# Patient Record
Sex: FEMALE | Race: WHITE | NOT HISPANIC OR LATINO | Employment: UNEMPLOYED | URBAN - METROPOLITAN AREA
[De-identification: names, ages, dates, MRNs, and addresses within clinical notes are randomized per-mention and may not be internally consistent; named-entity substitution may affect disease eponyms.]

---

## 2017-01-09 ENCOUNTER — OFFICE VISIT (OUTPATIENT)
Dept: NEUROSURGERY | Facility: CLINIC | Age: 47
End: 2017-01-09

## 2017-01-09 VITALS
HEIGHT: 68 IN | DIASTOLIC BLOOD PRESSURE: 82 MMHG | WEIGHT: 293 LBS | SYSTOLIC BLOOD PRESSURE: 132 MMHG | BODY MASS INDEX: 44.41 KG/M2 | HEART RATE: 75 BPM

## 2017-01-09 DIAGNOSIS — G96.198 ARACHNOID CYST OF SPINE: ICD-10-CM

## 2017-01-09 DIAGNOSIS — M54.6 PAIN IN THORACIC SPINE: Primary | ICD-10-CM

## 2017-01-09 PROCEDURE — 99213 OFFICE O/P EST LOW 20 MIN: CPT | Performed by: NURSE PRACTITIONER

## 2017-01-09 RX ORDER — MORPHINE SULFATE 30 MG/1
30 TABLET, FILM COATED, EXTENDED RELEASE ORAL 2 TIMES DAILY
COMMUNITY
End: 2017-03-08

## 2017-01-09 NOTE — MR AVS SNAPSHOT
Bisi Perla   1/9/2017 3:30 PM   Office Visit    Dept Phone:  [phone]   Encounter #:  [id number]    Provider:  MAZIN Morris   Department:  [facility] NEUROSURGERY                Your Full Care Plan              Today's Medication Changes          These changes are accurate as of: 1/9/17  4:21 PM.  If you have any questions, ask your nurse or doctor.               Stop taking medication(s)listed here:     atorvastatin 40 MG tablet   Commonly known as:  LIPITOR   Stopped by:  MAZIN Morris           buPROPion  MG 24 hr tablet   Commonly known as:  WELLBUTRIN XL   Stopped by:  MAZIN Morris           glipiZIDE 10 MG 24 hr tablet   Commonly known as:  GLUCOTROL   Stopped by:  MAZIN Morris           terconazole 0.4 % vaginal cream   Commonly known as:  TERAZOL 7   Stopped by:  MAZIN Morris           warfarin 2.5 MG tablet   Commonly known as:  COUMADIN   Stopped by:  MAZIN Morris                      Your Updated Medication List          This list is accurate as of: 1/9/17  4:21 PM.  Always use your most recent med list.                diclofenac 75 MG EC tablet   Commonly known as:  VOLTAREN       divalproex 500 MG 24 hr tablet   Commonly known as:  DEPAKOTE       FLUoxetine 20 MG capsule   Commonly known as:  PROzac       gabapentin 600 MG tablet   Commonly known as:  NEURONTIN       hydrochlorothiazide 25 MG tablet   Commonly known as:  HYDRODIURIL       insulin glargine 100 UNIT/ML injection   Commonly known as:  LANTUS       levoFLOXacin 500 MG tablet   Commonly known as:  LEVAQUIN   Take 1 tablet by mouth daily.       Morphine 30 MG 12 hr tablet   Commonly known as:  MS CONTIN       omeprazole 40 MG capsule   Commonly known as:  priLOSEC   TAKE ONE CAPSULE BY MOUTH ONE TIME A DAY       pioglitazone 45 MG tablet   Commonly known as:  ACTOS       QUEtiapine  MG 24 hr tablet   Commonly known as:  SEROquel XR       STEPHON HENRIQUEZ               You Were Diagnosed With        Codes Comments    Pain in thoracic spine    -  Primary ICD-10-CM: M54.6  ICD-9-CM: 724.1     Arachnoid cyst of spine     ICD-10-CM: G96.19  ICD-9-CM: 349.2       Instructions     None    Patient Instructions History      Upcoming Appointments     Visit Type Date Time Department    FOLLOW UP 2017  3:30 PM [facility]    NEW PATIENT 2017  2:45 PM [facility]    FOLLOW UP 2017  4:00 PM [facility]      [email or web address] Signup     [facility] [employer]enjar allows you to send messages to your doctor, view your test results, renew your prescriptions, schedule appointments, and more. To sign up, go to [email or web address] and click on the Sign Up Now link in the New User? box. Enter your Keenjar Activation Code exactly as it appears below along with the last four digits of your Social Security Number and your Date of Birth () to complete the sign-up process. If you do not sign up before the expiration date, you must request a new code.    Keenjar Activation Code: [email or web address]  Expires: 2017  4:21 PM    If you have questions, you can email [email or web address] or call [phone] to talk to our Keenjar staff. Remember, Keenjar is NOT to be used for urgent needs. For medical emergencies, dial 911.               Other Info from Your Visit           Your Appointments     2017  2:45 PM EST   New Patient with Ana Garcia MD   [facility]ISTS (--)    [address]  [facility] [address] [phone]           Bring all previous medical records and films, along with current medications and insurance information.            2017  4:00 PM EST   Follow Up with MAZIN Morris   [facility] NEUROSURGERY (--)    [address]  [facility] [address] [phone]           Arrive 15 minutes prior to  "appointment.              Allergies     Oxycodone-acetaminophen  Itching    Codeine      Metformin      Penicillins        Reason for Visit     Back Pain           Vital Signs     Blood Pressure Pulse Height Weight Body Mass Index Smoking Status    132/82 75 68\" (172.7 cm) 300 lb (136 kg) 45.61 kg/m2 Former Smoker      Problems and Diagnoses Noted     Pain in thoracic spine    -  Primary    Arachnoid cyst of spine            "

## 2017-01-09 NOTE — PROGRESS NOTES
Subjective   Patient ID: Bisi Perla is a 46 y.o. female is here today for follow-up on back pain. She is currently taking Morphine 30 mg PRN for pain.    Back Pain   This is a chronic problem. The current episode started more than 1 year ago. The problem occurs constantly. The problem is unchanged. The pain is present in the thoracic spine. The quality of the pain is described as stabbing. Radiates to: Due to the right ribs. The pain is at a severity of 7/10. The pain is moderate. The pain is the same all the time. The symptoms are aggravated by lying down, bending, sitting, standing, twisting and coughing. Associated symptoms include weakness (low back). Pertinent negatives include no bladder incontinence, bowel incontinence, leg pain, numbness or tingling. She has tried ice and heat (Oral narcotics, JESENIA) for the symptoms. The treatment provided no relief.       The following portions of the patient's history were reviewed and updated as appropriate: allergies, current medications, past family history, past medical history, past social history, past surgical history and problem list.    Review of Systems   Constitutional: Positive for activity change.   Gastrointestinal: Negative for bowel incontinence.   Genitourinary: Negative for bladder incontinence.   Musculoskeletal: Positive for arthralgias, back pain and myalgias.   Neurological: Positive for weakness (low back). Negative for tingling and numbness.   All other systems reviewed and are negative.      Objective   Physical Exam   Constitutional: She is oriented to person, place, and time. She appears well-developed and well-nourished. She is cooperative. No distress.   HENT:   Head: Normocephalic and atraumatic.   Eyes: Conjunctivae are normal.   Neck: Normal range of motion. Neck supple.   Cardiovascular: Normal rate.    Pulmonary/Chest: Effort normal. No respiratory distress.   Abdominal: Soft. She exhibits no distension. There is no tenderness.    Musculoskeletal: Normal range of motion. She exhibits no edema.   Neurological: She is alert and oriented to person, place, and time. She has normal strength. She displays normal reflexes. No sensory deficit. She exhibits normal muscle tone. Gait normal. Coordination normal. GCS eye subscore is 4. GCS verbal subscore is 5. GCS motor subscore is 6.   Reflex Scores:       Tricep reflexes are 2+ on the right side and 2+ on the left side.       Bicep reflexes are 2+ on the right side and 2+ on the left side.       Brachioradialis reflexes are 2+ on the right side and 2+ on the left side.       Patellar reflexes are 2+ on the right side and 2+ on the left side.       Achilles reflexes are 2+ on the right side and 2+ on the left side.  Negative Morales's; negative clonus   Skin: Skin is warm and dry. No rash noted. She is not diaphoretic.   Psychiatric: She has a normal mood and affect. Her speech is normal. Thought content normal.   Vitals reviewed.    Neurologic Exam     Mental Status   Oriented to person, place, and time.   Speech: speech is normal   Level of consciousness: alert    Motor Exam   Muscle bulk: normal  Overall muscle tone: normal    Strength   Strength 5/5 throughout.     Sensory Exam   Light touch normal.     Gait, Coordination, and Reflexes     Gait  Gait: normal (Using a cane but walks well without it.)    Reflexes   Right brachioradialis: 2+  Left brachioradialis: 2+  Right biceps: 2+  Left biceps: 2+  Right triceps: 2+  Left triceps: 2+  Right patellar: 2+  Left patellar: 2+  Right achilles: 2+  Left achilles: 2+  Right Morales: absent  Left Morales: absent  Right ankle clonus: absent  Left ankle clonus: absent       Tender to palpation in the mid thoracic region with radiation into the right ribs.       Assessment/Plan   Independent Review of Radiographic Studies:      No recent images of the thoracic spine available.    Medical Decision Making:      Ms. Perla returns to the office for the above  complaints.  She has a history of chronic neck and low back pain.  She is currently working with Dr. Quezada for pain management.  She recently received a lumbar epidural injection which helped her symptoms.  She is having worsening pain in the mid thoracic region after a fall that she experienced a little over a month ago.  She was seen in the emergency room at [facility].  Apparently a CT scan of the chest was performed that revealed an abnormal nodule.  She has followed up with her primary care physician, Dr. Guzman regarding this finding.     She continues to complain of mid thoracic pain with radiation toward the right ribs.  I will get an MRI of the thoracic spine without contrast.  She does have a history of a arachnoid cyst but given her kidney issues no contrast will be given.  Follow-up in the office thereafter for reevaluation.    Bisi was seen today for back pain.    Diagnoses and all orders for this visit:    Pain in thoracic spine  -     MRI Thoracic Spine Without Contrast; Future    Arachnoid cyst of spine  -     MRI Thoracic Spine Without Contrast; Future      Return for after radiographic imaging.

## 2017-01-09 NOTE — LETTER
January 9, 2017     Yordan Frias MD  [address]  [facility] [address]    Patient: Bisi Perla   YOB: 1970   Date of Visit: 1/9/2017       Dear Dr. Altagracia MD:    Bisi Perla was in my office today. Below are the relevant portions of my assessment and plan of care.      Independent Review of Radiographic Studies:      No recent images of the thoracic spine available.    Medical Decision Making:      Ms. Perla returns to the office for the above complaints.  She has a history of chronic neck and low back pain.  She is currently working with Dr. Quezada for pain management.  She recently received a lumbar epidural injection which helped her symptoms.  She is having worsening pain in the mid thoracic region after a fall that she experienced a little over a month ago.  She was seen in the emergency room at [facility].  Apparently a CT scan of the chest was performed that revealed an abnormal nodule.  She has followed up with her primary care physician, Dr. Guzman regarding this finding.     She continues to complain of mid thoracic pain with radiation toward the right ribs.  I will get an MRI of the thoracic spine without contrast.  She does have a history of a arachnoid cyst but given her kidney issues no contrast will be given.  Follow-up in the office thereafter for reevaluation.    Bisi was seen today for back pain.    Diagnoses and all orders for this visit:    Pain in thoracic spine  -     MRI Thoracic Spine Without Contrast; Future    Arachnoid cyst of spine  -     MRI Thoracic Spine Without Contrast; Future      Return for after radiographic imaging.            If you have questions, please do not hesitate to call me. I look forward to following Bisi along with you.         Sincerely,        Jennifer Solomon, MAZIN        CC: No Recipients

## 2017-01-17 ENCOUNTER — HOSPITAL ENCOUNTER (OUTPATIENT)
Dept: MRI IMAGING | Facility: HOSPITAL | Age: 47
Discharge: HOME | End: 2017-01-17

## 2017-01-18 ENCOUNTER — OFFICE VISIT (OUTPATIENT)
Dept: CARDIOLOGY | Facility: CLINIC | Age: 47
End: 2017-01-18

## 2017-01-18 VITALS — HEART RATE: 94 BPM | SYSTOLIC BLOOD PRESSURE: 129 MMHG | DIASTOLIC BLOOD PRESSURE: 76 MMHG

## 2017-01-18 DIAGNOSIS — R53.1 LEFT-SIDED WEAKNESS: ICD-10-CM

## 2017-01-18 DIAGNOSIS — R94.30 ABNORMAL CARDIAC FUNCTION TEST: Primary | ICD-10-CM

## 2017-01-18 PROCEDURE — 93000 ELECTROCARDIOGRAM COMPLETE: CPT | Performed by: INTERNAL MEDICINE

## 2017-01-18 PROCEDURE — 99203 OFFICE O/P NEW LOW 30 MIN: CPT | Performed by: INTERNAL MEDICINE

## 2017-01-18 NOTE — MR AVS SNAPSHOT
Bisi Perla   1/18/2017 2:45 PM   Office Visit    Dept Phone:  [phone]   Encounter #:  [id number]    Provider:  Ana Garcia MD   Department:  [facility] HEART SPECIALISTS                Your Full Care Plan              Your Updated Medication List          This list is accurate as of: 1/18/17  4:26 PM.  Always use your most recent med list.                diclofenac 75 MG EC tablet   Commonly known as:  VOLTAREN       divalproex 500 MG 24 hr tablet   Commonly known as:  DEPAKOTE       FLUoxetine 20 MG capsule   Commonly known as:  PROzac       gabapentin 600 MG tablet   Commonly known as:  NEURONTIN       hydrochlorothiazide 25 MG tablet   Commonly known as:  HYDRODIURIL       insulin glargine 100 UNIT/ML injection   Commonly known as:  LANTUS       levoFLOXacin 500 MG tablet   Commonly known as:  LEVAQUIN   Take 1 tablet by mouth daily.       Morphine 30 MG 12 hr tablet   Commonly known as:  MS CONTIN       omeprazole 40 MG capsule   Commonly known as:  priLOSEC   TAKE ONE CAPSULE BY MOUTH ONE TIME A DAY       pioglitazone 45 MG tablet   Commonly known as:  ACTOS       QUEtiapine  MG 24 hr tablet   Commonly known as:  SEROquel XR       VICTOZA SC               We Performed the Following     ECG 12 Lead       You Were Diagnosed With        Codes Comments    Abnormal cardiac function test    -  Primary ICD-10-CM: R94.30  ICD-9-CM: 794.30       Instructions     None    Patient Instructions History      Upcoming Appointments     Visit Type Date Time Department    NEW PATIENT 1/18/2017  2:45 PM [facility]    MRI [facility] TSPINE WO CONTRAST 1/19/2017  1:00 PM [facility]    FOLLOW UP 1/23/2017  4:00 PM [facility]      MyChart Signup     [facility] [employer]ixers allows you to send messages to your doctor, view your test results, renew your prescriptions, schedule appointments, and more. To sign up, go to [email or web address]  and click on the Sign Up Now link in the New User? box. Enter your Nearbox Activation Code exactly as it appears below along with the last four digits of your Social Security Number and your Date of Birth () to complete the sign-up process. If you do not sign up before the expiration date, you must request a new code.    Nearbox Activation Code: [email or web address]  Expires: 2017  4:21 PM    If you have questions, you can email [email or web address] or call [phone] to talk to our Nearbox staff. Remember, Nearbox is NOT to be used for urgent needs. For medical emergencies, dial 911.               Other Info from Your Visit           Your Appointments     2017  1:00 PM EST   MR cedric tanner wo contrast with CEDRIC [facility] MRI 1   [facility] ([place])    [address] [address] [phone]           Arrive 30 minutes prior to exam time. Bring a current list of medications. Do not wear jewelry.            2017  4:00 PM EST   Follow Up with MAZIN Morris   [facility]UR[facility]ERY (--)    [address]  [facility] [address] [phone]           Arrive 15 minutes prior to appointment.              Allergies     Penicillins  Hives, Itching, Shortness Of Breath    Oxycodone-acetaminophen  Itching    Codeine      Metformin        Reason for Visit     Hypertension           Vital Signs     Blood Pressure Pulse Smoking Status             129/76 94 Former Smoker         Problems and Diagnoses Noted     Abnormal cardiac function test

## 2017-01-18 NOTE — PROGRESS NOTES
Subjective:        CC  LAD CALCIFICATION ON MRI     Bisi Perla is a 46 y.o. female who is here for the cardiac evaluation as the patient had a MRI done last month, and was found to have the LAD calcification, denies any chest pains or tightness in chest no heaviness with a pressure sensation patient has significant cardiac risk factors Cardiac risk factors: diabetes mellitus, dyslipidemia, family history of premature cardiovascular disease, hypertension, obesity (BMI >= 30 kg/m2) and sedentary lifestyle.    Past Medical History   Diagnosis Date   • Bipolar 1 disorder    • Diabetes mellitus    • GERD (gastroesophageal reflux disease)    • History of transfusion    • Hyperlipidemia    • Hypertension    • Neuropathy    • Obesity     reports that she has quit smoking. She started smoking about 31 years ago. She smoked 1.00 pack per day. She quit smokeless tobacco use about 8 years ago. She reports that she drinks about 0.6 oz of alcohol per week  She reports that she does not use illicit drugs.  Family History   Problem Relation Age of Onset   • Hypertension Father    • Heart disease Father         Review of Systems  Constitutional: No wt loss, fever   Gastrointestinal: No nausea , abdominal pain  Behavioral/Psych: No insomnia or anxiety  Cardiovascular ----no chest pains or tightness in chest. All other systems reviewed and are negative    Objective:       Physical Exam        Visit Vitals   • /76   • Pulse 94     General appearance: alert, appears stated age and cooperative, oriented x 3  Eyes  Clear conjunctiva, pupil reactive  Neck: no JVD and supple, symmetrical, trachea midline, no thyromegaly  Lungs: clear to auscultation bilaterally  Chest wall: no tenderness  Heart:Normal PMI, S1, S2 normal, no murmur, rubs or gallop  GI: soft, non-tender; bowel sounds normal; no masses,  no                                    hepato/spleenomegaly  Extremities: normal ROM in lower extremities, no cyanosis or  edema  Pulses: 2+ and symmetric  Skin: Skin color, texture, turgor normal. No rashes or lesions  Psychologic   Pleasant and cooperative        Cardiographics  @  ECG 12 Lead  Date/Time: 1/18/2017 2:51 PM  Performed by: ERICA DAVILA  Authorized by: ERICA DAVILA   Comparison: not compared with previous ECG   Previous ECG: no previous ECG available  Rhythm: sinus rhythm  ST Segments: ST segments normal  Clinical impression: normal ECG            Echocardiogram: not done    Imaging  Chest x-ray: not done     Lab Review   not applicable       Current Outpatient Prescriptions:   •  diclofenac (VOLTAREN) 75 MG EC tablet, Take 75 mg by mouth 2 (two) times a day as needed., Disp: , Rfl:   •  divalproex (DEPAKOTE) 500 MG 24 hr tablet, Take 1,500 mg by mouth every night., Disp: , Rfl:   •  FLUoxetine (PROzac) 20 MG capsule, Take 60 mg by mouth daily., Disp: , Rfl:   •  gabapentin (NEURONTIN) 600 MG tablet, Take 600 mg by mouth 3 (three) times a day., Disp: , Rfl:   •  hydrochlorothiazide (HYDRODIURIL) 25 MG tablet, Take 25 mg by mouth daily., Disp: , Rfl:   •  insulin glargine (LANTUS) 100 UNIT/ML injection, Inject 18 Units under the skin every night., Disp: , Rfl:   •  levofloxacin (LEVAQUIN) 500 MG tablet, Take 1 tablet by mouth daily., Disp: 10 tablet, Rfl: 0  •  Liraglutide (VICTOZA SC), Inject 1.8 mg under the skin daily., Disp: , Rfl:   •  Morphine (MS CONTIN) 30 MG 12 hr tablet, Take 30 mg by mouth 2 (Two) Times a Day., Disp: , Rfl:   •  omeprazole (PriLOSEC) 40 MG capsule, TAKE ONE CAPSULE BY MOUTH ONE TIME A DAY, Disp: 14 capsule, Rfl: 0  •  pioglitazone (ACTOS) 45 MG tablet, Take 45 mg by mouth daily., Disp: , Rfl:   •  QUEtiapine XR (SEROquel XR) 400 MG 24 hr tablet, Take 400 mg by mouth every night., Disp: , Rfl:         Assessment:          Patient Active Problem List   Diagnosis   • Left-sided weakness   • Abnormal cardiac function test         Plan:       Clinically no signs and symptoms of  angina or chf, no side effects with current meds,.    Continue current meds and treatment.      LAD CALCIFICATION  WILL PROCEED WITH LEXISCAN CARDIOLYTE AND ECHO'    SEE US 2 WKS    MAY START ASA HIGH RISK FOR BLEEDING EXPLAINED          Counseling was given to Bisi Perla for the following topics:  risk factor reductions, prognosis and risks and benefits of treatment options .       SMOKING COUNSELING:    Counseling given: Not Answered      .  EMR Dragon/Transcription disclaimer:   Much of this encounter note is an electronic transcription/translation of spoken language to printed text. The electronic translation of spoken language may permit erroneous, or at times, nonsensical words or phrases to be inadvertently transcribed; Although I have reviewed the note for such errors, some may still exist.

## 2017-01-19 ENCOUNTER — HOSPITAL ENCOUNTER (OUTPATIENT)
Dept: MRI IMAGING | Facility: HOSPITAL | Age: 47
Discharge: HOME | End: 2017-01-19

## 2017-01-19 ENCOUNTER — HOSPITAL ENCOUNTER (OUTPATIENT)
Dept: MRI IMAGING | Facility: HOSPITAL | Age: 47
Discharge: HOME | End: 2017-01-19
Admitting: NURSE PRACTITIONER

## 2017-01-19 DIAGNOSIS — R94.30 ABNORMAL CARDIAC FUNCTION TEST: Primary | ICD-10-CM

## 2017-01-19 DIAGNOSIS — M54.6 PAIN IN THORACIC SPINE: ICD-10-CM

## 2017-01-19 DIAGNOSIS — R53.1 LEFT-SIDED WEAKNESS: ICD-10-CM

## 2017-01-19 DIAGNOSIS — G96.198 ARACHNOID CYST OF SPINE: ICD-10-CM

## 2017-01-19 PROCEDURE — 72146 MRI CHEST SPINE W/O DYE: CPT

## 2017-01-23 ENCOUNTER — OFFICE VISIT (OUTPATIENT)
Dept: NEUROSURGERY | Facility: CLINIC | Age: 47
End: 2017-01-23

## 2017-01-23 VITALS
SYSTOLIC BLOOD PRESSURE: 133 MMHG | HEART RATE: 98 BPM | DIASTOLIC BLOOD PRESSURE: 87 MMHG | WEIGHT: 293 LBS | HEIGHT: 68 IN | BODY MASS INDEX: 44.41 KG/M2

## 2017-01-23 DIAGNOSIS — G96.198 ARACHNOID CYST OF SPINE: ICD-10-CM

## 2017-01-23 DIAGNOSIS — M54.6 PAIN IN THORACIC SPINE: Primary | ICD-10-CM

## 2017-01-23 PROCEDURE — 99213 OFFICE O/P EST LOW 20 MIN: CPT | Performed by: NURSE PRACTITIONER

## 2017-01-23 RX ORDER — CYCLOBENZAPRINE HCL 10 MG
10 TABLET ORAL 3 TIMES DAILY PRN
COMMUNITY
Start: 2017-01-19

## 2017-01-23 RX ORDER — CANAGLIFLOZIN 300 MG/1
300 TABLET, FILM COATED ORAL DAILY
COMMUNITY
Start: 2017-01-18 | End: 2018-01-18 | Stop reason: ALTCHOICE

## 2017-01-23 RX ORDER — INSULIN LISPRO 100 [IU]/ML
INJECTION, SOLUTION INTRAVENOUS; SUBCUTANEOUS
COMMUNITY
Start: 2017-01-22 | End: 2018-01-18 | Stop reason: ALTCHOICE

## 2017-01-23 NOTE — LETTER
January 23, 2017     Yordan Frias MD  [address]  [facility] [address]    Patient: Bisi Perla   YOB: 1970   Date of Visit: 1/23/2017       Dear Dr. Altagracia MD:    Bisi Perla was in my office today. Below are the relevant portions of my assessment and plan of care.      Independent Review of Radiographic Studies:      I reviewed the MRI images of the thoracic spine without contrast today in the office and I also reviewed the images with Dr. Aguilar. The MRI showed no change in the known thoracic arachnoid cyst at T3/4. No other cord abnormality noted. There is evidence of a new Schmorl's node at T7/8 with associated marrow edema.     Medical Decision Making:      Ms. Perla returns after a new thoracic MRI. I reviewed the images with Dr. Aguilar in the office. The MRI did not show any evidence of fracture. The arachnoid cyst has not changed. Ms. Perla also notes some pain radiating into the R flank region from time to time. She also notes that the thoracic pain is doing better right now. I recommended that she talk to her primary care doctor about a gall bladder work up if the pain continues. She will call us if she has any worsening symptoms.     Bisi was seen today for back pain.    Diagnoses and all orders for this visit:    Pain in thoracic spine  -     Ambulatory Referral to Pain Management    Arachnoid cyst of spine  -     Ambulatory Referral to Pain Management      Return if symptoms worsen or fail to improve.            If you have questions, please do not hesitate to call me. I look forward to following Bisi along with you.         Sincerely,        MAZIN Morris        CC: No Recipients

## 2017-01-23 NOTE — MR AVS SNAPSHOT
Bisi Perla   1/23/2017 4:00 PM   Office Visit    Dept Phone:  [phone]   Encounter #:  [id number]    Provider:  MAZIN Morris   Department:  [facility] NEUROSURGERY                Your Full Care Plan              Today's Medication Changes          These changes are accurate as of: 1/23/17  5:57 PM.  If you have any questions, ask your nurse or doctor.               Medication(s)that have changed:     VICTOZA 18 MG/3ML solution pen-injector   Generic drug:  Liraglutide   DIAL AND INJECT SUBCUTANEOUSLY 1.8MG DAILY   What changed:  Another medication with the same name was removed. Continue taking this medication, and follow the directions you see here.   Changed by:  MAZIN Morris                  Your Updated Medication List          This list is accurate as of: 1/23/17  5:57 PM.  Always use your most recent med list.                cyclobenzaprine 10 MG tablet   Commonly known as:  FLEXERIL       diclofenac 75 MG EC tablet   Commonly known as:  VOLTAREN       divalproex 500 MG 24 hr tablet   Commonly known as:  DEPAKOTE       FLUoxetine 20 MG capsule   Commonly known as:  PROzac       gabapentin 600 MG tablet   Commonly known as:  NEURONTIN       HUMALOG KWIKPEN 100 UNIT/ML solution pen-injector   Generic drug:  Insulin Lispro       hydrochlorothiazide 25 MG tablet   Commonly known as:  HYDRODIURIL       insulin glargine 100 UNIT/ML injection   Commonly known as:  LANTUS       INVOKANA 300 MG tablet   Generic drug:  Canagliflozin       levoFLOXacin 500 MG tablet   Commonly known as:  LEVAQUIN   Take 1 tablet by mouth daily.       Morphine 30 MG 12 hr tablet   Commonly known as:  MS CONTIN       omeprazole 40 MG capsule   Commonly known as:  priLOSEC   TAKE ONE CAPSULE BY MOUTH ONE TIME A DAY       pioglitazone 45 MG tablet   Commonly known as:  ACTOS       QUEtiapine  MG 24 hr tablet   Commonly known as:  SEROquel XR       VICTOZA 18 MG/3ML  solution pen-injector   Generic drug:  Liraglutide               We Performed the Following     Ambulatory Referral to Pain Management       You Were Diagnosed With        Codes Comments    Pain in thoracic spine    -  Primary ICD-10-CM: M54.6  ICD-9-CM: 724.1     Arachnoid cyst of spine     ICD-10-CM: G96.19  ICD-9-CM: 349.2       Instructions     None    Patient Instructions History      Upcoming Appointments     Visit Type Date Time Department    FOLLOW UP 2017  4:00 PM [facility]OS[facility]    [facility] HOLTER MONITOR 24 HOUR VT 2017  1:00 PM [facility]    [facility] KHS NM VT 2017  8:00 AM [facility] K[facility]    [facility] K[facility] NM SCAN VT 2017  9:00 AM [facility] KHS [facility]    [facility] K[facility] NM STRESS VT 2017 10:00 AM [facility] KHS [facility]    [facility] K[facility] NM SCAN VT 2017 11:00 AM [facility]    BH [facility] ECHO 2D COMPLETE VT 2017  3:45 PM [facility]    OFFICE VISIT 3/8/2017  3:15 PM [facility] SPT [facility]      T[employer] Signup     [facility] [employer] allows you to send messages to your doctor, view your test results, renew your prescriptions, schedule appointments, and more. To sign up, go to [employer] and click on the Sign Up Now link in the New User? box. Enter your [employer] Activation Code exactly as it appears below along with the last four digits of your Social Security Number and your Date of Birth () to complete the sign-up process. If you do not sign up before the expiration date, you must request a new code.    [employer] Activation Code: [email or web address]  Expires: 2017  5:57 PM    If you have questions, you can email [email or web address] or call [phone] to talk to our [employer] staff. Remember, [employer] is NOT to be used for urgent needs. For medical emergencies, dial 911.               Other Info from Your Visit           Your Appointments     2017  1:00 PM EST   HOLTER MONITOR - 24 HOUR VISIT with [facility]ELY[facility] CARD HOLTER ROOM   [facility]  "[place] [facility] ([place])    [address] [address]   [phone]            Jan 30, 2017  8:00 AM EST   (Arrive by 7:30 AM EST)    [facility] K[facility] NM INJ with [facility] ([place])    [address]  [address]  [facility] [address] [phone]       To complete registration            Jan 30, 2017  9:00 AM EST    [facility] K[facility] NM SCAN VT with [facility]   [place] [facility] ([place])    [address]  [address]  [facility] [id number] [phone]            Jan 30, 2017 10:00 AM EST    [facility] K[facility] NM SCAN VT with [facility]N [facility]K STRESS LAB   [facility] [facility] ([place])    [address]  [address]  [facility] [address] [phone]            Jan 30, 2017 11:00 AM EST    EV[facility] KHS NM SCAN VT with [facility]   [place] [facility] ([place])    [address]  [address]  [facility] [address] [phone]              Allergies     Penicillins  Hives, Itching, Shortness Of Breath    Oxycodone-acetaminophen  Itching    Codeine      Metformin        Reason for Visit     Back Pain           Vital Signs     Blood Pressure Pulse Height Weight Body Mass Index Smoking Status    133/87 98 68\" (172.7 cm) 300 lb (136 kg) 45.61 kg/m2 Former Smoker      Problems and Diagnoses Noted     Pain in thoracic spine    -  Primary    Arachnoid cyst of spine            "

## 2017-01-23 NOTE — PROGRESS NOTES
Subjective   Patient ID: Bisi Perla is a 46 y.o. female is here today for follow-up on back pain. She is here with a new thoracic MRI. She is complainng of numbness in her face that started a few days ago. She is currently taking Morphine 30 mg PRN for pain.    Back Pain   This is a chronic problem. The current episode started more than 1 year ago. The problem occurs constantly. The problem is unchanged. The pain is present in the thoracic spine. Radiates to: ribs on right side. The pain is at a severity of 9/10. The pain is severe. The pain is the same all the time. The symptoms are aggravated by lying down, bending, sitting, standing, twisting and coughing. Associated symptoms include numbness (left side), tingling and weakness (low back). Pertinent negatives include no bladder incontinence or bowel incontinence. She has tried heat and ice (oral narcotics, JESENIA) for the symptoms. The treatment provided no relief.       The following portions of the patient's history were reviewed and updated as appropriate: allergies, current medications, past family history, past medical history, past social history, past surgical history and problem list.    Review of Systems   Constitutional: Positive for activity change.   Gastrointestinal: Negative for bowel incontinence.   Genitourinary: Negative for bladder incontinence.   Musculoskeletal: Positive for back pain.   Neurological: Positive for tingling, weakness (low back) and numbness (left side).   All other systems reviewed and are negative.      Objective   Physical Exam   Constitutional: She is oriented to person, place, and time. She appears well-developed and well-nourished. She is cooperative. No distress.   HENT:   Head: Normocephalic and atraumatic.   Eyes: Conjunctivae are normal.   Neck: Normal range of motion. Neck supple.   Cardiovascular: Normal rate.    Pulmonary/Chest: Effort normal. No respiratory distress.   Abdominal: Soft. She exhibits no distension.  There is no tenderness.   Musculoskeletal: Normal range of motion. She exhibits tenderness (tender to palpation in the mid/lower thoracic region.). She exhibits no edema.   Neurological: She is alert and oriented to person, place, and time. She has normal strength. She displays normal reflexes. No sensory deficit. She exhibits normal muscle tone. Gait normal. Coordination and gait normal. GCS eye subscore is 4. GCS verbal subscore is 5. GCS motor subscore is 6.   Reflex Scores:       Tricep reflexes are 2+ on the right side and 2+ on the left side.       Bicep reflexes are 2+ on the right side and 2+ on the left side.       Brachioradialis reflexes are 2+ on the right side and 2+ on the left side.       Patellar reflexes are 2+ on the right side and 2+ on the left side.       Achilles reflexes are 2+ on the right side and 2+ on the left side.  Negative Morales's; negative clonus   Skin: Skin is warm and dry. No rash noted. She is not diaphoretic.   Psychiatric: She has a normal mood and affect. Her speech is normal. Thought content normal.   Vitals reviewed.    Neurologic Exam     Mental Status   Oriented to person, place, and time.   Speech: speech is normal     Motor Exam   Muscle bulk: normal  Overall muscle tone: normal    Strength   Strength 5/5 throughout.     Sensory Exam   Light touch normal.     Gait, Coordination, and Reflexes     Gait  Gait: normal    Reflexes   Right brachioradialis: 2+  Left brachioradialis: 2+  Right biceps: 2+  Left biceps: 2+  Right triceps: 2+  Left triceps: 2+  Right patellar: 2+  Left patellar: 2+  Right achilles: 2+  Left achilles: 2+  Right Morales: absent  Left Morales: absent  Right ankle clonus: absent  Left ankle clonus: absent       Straight leg raise was negative bilaterally.       Assessment/Plan   Independent Review of Radiographic Studies:      I reviewed the MRI images of the thoracic spine without contrast today in the office and I also reviewed the images with   Jeff. The MRI showed no change in the known thoracic arachnoid cyst at T3/4. No other cord abnormality noted. There is evidence of a new Schmorl's node at T7/8 with associated marrow edema.     Medical Decision Making:      Ms. Perla returns after a new thoracic MRI. I reviewed the images with Dr. Aguilar in the office. The MRI did not show any evidence of fracture. The arachnoid cyst has not changed. Ms. Perla also notes some pain radiating into the R flank region from time to time. She also notes that the thoracic pain is doing better right now. I recommended that she talk to her primary care doctor about a gall bladder work up if the pain continues. She will call us if she has any worsening symptoms.     Bisi was seen today for back pain.    Diagnoses and all orders for this visit:    Pain in thoracic spine  -     Ambulatory Referral to Pain Management    Arachnoid cyst of spine  -     Ambulatory Referral to Pain Management      Return if symptoms worsen or fail to improve.

## 2017-01-26 ENCOUNTER — HOSPITAL ENCOUNTER (OUTPATIENT)
Dept: CARDIOLOGY | Facility: HOSPITAL | Age: 47
Discharge: HOME | End: 2017-01-26
Attending: INTERNAL MEDICINE | Admitting: INTERNAL MEDICINE

## 2017-01-26 DIAGNOSIS — R94.30 ABNORMAL CARDIAC FUNCTION TEST: ICD-10-CM

## 2017-01-26 DIAGNOSIS — R53.1 LEFT-SIDED WEAKNESS: ICD-10-CM

## 2017-01-26 PROCEDURE — 93225 XTRNL ECG REC<48 HRS REC: CPT

## 2017-01-26 PROCEDURE — 93226 XTRNL ECG REC<48 HR SCAN A/R: CPT

## 2017-01-30 ENCOUNTER — APPOINTMENT (OUTPATIENT)
Dept: CARDIOLOGY | Facility: HOSPITAL | Age: 47
End: 2017-01-30
Attending: INTERNAL MEDICINE

## 2017-01-30 ENCOUNTER — HOSPITAL ENCOUNTER (OUTPATIENT)
Dept: CARDIOLOGY | Facility: HOSPITAL | Age: 47
End: 2017-01-30
Attending: INTERNAL MEDICINE

## 2017-02-01 PROCEDURE — 93227 XTRNL ECG REC<48 HR R&I: CPT | Performed by: INTERNAL MEDICINE

## 2017-02-02 ENCOUNTER — APPOINTMENT (OUTPATIENT)
Dept: CARDIOLOGY | Facility: HOSPITAL | Age: 47
End: 2017-02-02
Attending: INTERNAL MEDICINE

## 2017-02-02 ENCOUNTER — HOSPITAL ENCOUNTER (OUTPATIENT)
Dept: CARDIOLOGY | Facility: HOSPITAL | Age: 47
End: 2017-02-02
Attending: INTERNAL MEDICINE

## 2017-02-13 ENCOUNTER — HOSPITAL ENCOUNTER (OUTPATIENT)
Dept: CARDIOLOGY | Facility: HOSPITAL | Age: 47
Discharge: HOME | End: 2017-02-13
Attending: INTERNAL MEDICINE | Admitting: INTERNAL MEDICINE

## 2017-02-13 ENCOUNTER — HOSPITAL ENCOUNTER (OUTPATIENT)
Dept: CARDIOLOGY | Facility: HOSPITAL | Age: 47
Discharge: HOME | End: 2017-02-13
Attending: INTERNAL MEDICINE

## 2017-02-13 VITALS
HEIGHT: 68 IN | BODY MASS INDEX: 44.41 KG/M2 | WEIGHT: 293 LBS | SYSTOLIC BLOOD PRESSURE: 133 MMHG | DIASTOLIC BLOOD PRESSURE: 87 MMHG

## 2017-02-13 VITALS — SYSTOLIC BLOOD PRESSURE: 120 MMHG | HEART RATE: 86 BPM | DIASTOLIC BLOOD PRESSURE: 72 MMHG

## 2017-02-13 DIAGNOSIS — R94.30 ABNORMAL CARDIAC FUNCTION TEST: ICD-10-CM

## 2017-02-13 DIAGNOSIS — R53.1 LEFT-SIDED WEAKNESS: ICD-10-CM

## 2017-02-13 LAB
BH CV ECHO MEAS - ACS: 1.9 CM
BH CV ECHO MEAS - AO MAX PG (FULL): 6.9 MMHG
BH CV ECHO MEAS - AO MAX PG: 12.3 MMHG
BH CV ECHO MEAS - AO MEAN PG (FULL): 4 MMHG
BH CV ECHO MEAS - AO MEAN PG: 7 MMHG
BH CV ECHO MEAS - AO ROOT AREA (BSA CORRECTED): 1.2
BH CV ECHO MEAS - AO ROOT AREA: 6.6 CM^2
BH CV ECHO MEAS - AO ROOT DIAM: 2.9 CM
BH CV ECHO MEAS - AO V2 MAX: 175 CM/SEC
BH CV ECHO MEAS - AO V2 MEAN: 126 CM/SEC
BH CV ECHO MEAS - AO V2 VTI: 36.5 CM
BH CV ECHO MEAS - AVA(I,A): 2.4 CM^2
BH CV ECHO MEAS - AVA(I,D): 2.4 CM^2
BH CV ECHO MEAS - AVA(V,A): 2.3 CM^2
BH CV ECHO MEAS - AVA(V,D): 2.3 CM^2
BH CV ECHO MEAS - BSA(HAYCOCK): 2.6 M^2
BH CV ECHO MEAS - BSA: 2.4 M^2
BH CV ECHO MEAS - BZI_BMI: 45.6 KILOGRAMS/M^2
BH CV ECHO MEAS - BZI_METRIC_HEIGHT: 172.7 CM
BH CV ECHO MEAS - BZI_METRIC_WEIGHT: 136.1 KG
BH CV ECHO MEAS - CONTRAST EF 4CH: 67.6 ML/M^2
BH CV ECHO MEAS - EDV(CUBED): 39.3 ML
BH CV ECHO MEAS - EDV(MOD-SP4): 71 ML
BH CV ECHO MEAS - EDV(TEICH): 47.4 ML
BH CV ECHO MEAS - EF(CUBED): 64.8 %
BH CV ECHO MEAS - EF(MOD-SP4): 67.6 %
BH CV ECHO MEAS - EF(TEICH): 57.5 %
BH CV ECHO MEAS - ESV(CUBED): 13.8 ML
BH CV ECHO MEAS - ESV(MOD-SP4): 23 ML
BH CV ECHO MEAS - ESV(TEICH): 20.2 ML
BH CV ECHO MEAS - FS: 29.4 %
BH CV ECHO MEAS - IVS/LVPW: 1
BH CV ECHO MEAS - IVSD: 1.2 CM
BH CV ECHO MEAS - LA DIMENSION: 4.1 CM
BH CV ECHO MEAS - LA/AO: 1.4
BH CV ECHO MEAS - LAT PEAK E' VEL: 12 CM/SEC
BH CV ECHO MEAS - LV DIASTOLIC VOL/BSA (35-75): 29.2 ML/M^2
BH CV ECHO MEAS - LV MASS(C)D: 130.2 GRAMS
BH CV ECHO MEAS - LV MASS(C)DI: 53.6 GRAMS/M^2
BH CV ECHO MEAS - LV MAX PG: 5.4 MMHG
BH CV ECHO MEAS - LV MEAN PG: 3 MMHG
BH CV ECHO MEAS - LV SYSTOLIC VOL/BSA (12-30): 9.5 ML/M^2
BH CV ECHO MEAS - LV V1 MAX: 116 CM/SEC
BH CV ECHO MEAS - LV V1 MEAN: 83.3 CM/SEC
BH CV ECHO MEAS - LV V1 VTI: 25.7 CM
BH CV ECHO MEAS - LVIDD: 3.4 CM
BH CV ECHO MEAS - LVIDS: 2.4 CM
BH CV ECHO MEAS - LVLD AP4: 8 CM
BH CV ECHO MEAS - LVLS AP4: 6.2 CM
BH CV ECHO MEAS - LVOT AREA (M): 3.5 CM^2
BH CV ECHO MEAS - LVOT AREA: 3.5 CM^2
BH CV ECHO MEAS - LVOT DIAM: 2.1 CM
BH CV ECHO MEAS - LVPWD: 1.2 CM
BH CV ECHO MEAS - MED PEAK E' VEL: 6.6 CM/SEC
BH CV ECHO MEAS - MV A DUR: 0.16 SEC
BH CV ECHO MEAS - MV A MAX VEL: 107 CM/SEC
BH CV ECHO MEAS - MV DEC SLOPE: 583 CM/SEC^2
BH CV ECHO MEAS - MV DEC TIME: 0.19 SEC
BH CV ECHO MEAS - MV E MAX VEL: 115 CM/SEC
BH CV ECHO MEAS - MV E/A: 1.1
BH CV ECHO MEAS - MV MAX PG: 5.9 MMHG
BH CV ECHO MEAS - MV MEAN PG: 3 MMHG
BH CV ECHO MEAS - MV P1/2T MAX VEL: 116 CM/SEC
BH CV ECHO MEAS - MV P1/2T: 58.3 MSEC
BH CV ECHO MEAS - MV V2 MAX: 121 CM/SEC
BH CV ECHO MEAS - MV V2 MEAN: 78.6 CM/SEC
BH CV ECHO MEAS - MV V2 VTI: 29.7 CM
BH CV ECHO MEAS - MVA P1/2T LCG: 1.9 CM^2
BH CV ECHO MEAS - MVA(P1/2T): 3.8 CM^2
BH CV ECHO MEAS - MVA(VTI): 3 CM^2
BH CV ECHO MEAS - PA MAX PG (FULL): 1.4 MMHG
BH CV ECHO MEAS - PA MAX PG: 2.8 MMHG
BH CV ECHO MEAS - PA V2 MAX: 84.2 CM/SEC
BH CV ECHO MEAS - PULM A REVS DUR: 0.13 SEC
BH CV ECHO MEAS - PULM A REVS VEL: 25.7 CM/SEC
BH CV ECHO MEAS - PULM DIAS VEL: 41.2 CM/SEC
BH CV ECHO MEAS - PULM S/D: 1.6
BH CV ECHO MEAS - PULM SYS VEL: 65.6 CM/SEC
BH CV ECHO MEAS - PVA(V,A): 2.2 CM^2
BH CV ECHO MEAS - PVA(V,D): 2.2 CM^2
BH CV ECHO MEAS - QP/QS: 0.41
BH CV ECHO MEAS - RAP SYSTOLE: 10 MMHG
BH CV ECHO MEAS - RV MAX PG: 1.4 MMHG
BH CV ECHO MEAS - RV MEAN PG: 1 MMHG
BH CV ECHO MEAS - RV V1 MAX: 59.9 CM/SEC
BH CV ECHO MEAS - RV V1 MEAN: 43.3 CM/SEC
BH CV ECHO MEAS - RV V1 VTI: 11.7 CM
BH CV ECHO MEAS - RVDD: 2.3 CM
BH CV ECHO MEAS - RVOT AREA: 3.1 CM^2
BH CV ECHO MEAS - RVOT DIAM: 2 CM
BH CV ECHO MEAS - RVSP: 22.1 MMHG
BH CV ECHO MEAS - SI(AO): 99.3 ML/M^2
BH CV ECHO MEAS - SI(CUBED): 10.5 ML/M^2
BH CV ECHO MEAS - SI(LVOT): 36.7 ML/M^2
BH CV ECHO MEAS - SI(MOD-SP4): 19.8 ML/M^2
BH CV ECHO MEAS - SI(TEICH): 11.2 ML/M^2
BH CV ECHO MEAS - SV(AO): 241.1 ML
BH CV ECHO MEAS - SV(CUBED): 25.5 ML
BH CV ECHO MEAS - SV(LVOT): 89 ML
BH CV ECHO MEAS - SV(MOD-SP4): 48 ML
BH CV ECHO MEAS - SV(RVOT): 36.8 ML
BH CV ECHO MEAS - SV(TEICH): 27.3 ML
BH CV ECHO MEAS - TAPSE (>1.6): 2 CM2
BH CV ECHO MEAS - TR MAX VEL: 174 CM/SEC
BH CV XLRA - RV BASE: 3.4 CM
BH CV XLRA - RV LENGTH: 7.5 CM
BH CV XLRA - RV MID: 2.5 CM
BH CV XLRA - TDI S': 12.6 CM/SEC
LEFT ATRIUM VOLUME INDEX: 31.2 ML/M2

## 2017-02-13 PROCEDURE — 78452 HT MUSCLE IMAGE SPECT MULT: CPT

## 2017-02-13 PROCEDURE — 93018 CV STRESS TEST I&R ONLY: CPT | Performed by: INTERNAL MEDICINE

## 2017-02-13 PROCEDURE — 0399T HC MYOCARDL STRAIN IMAG QUAN ASSMT PER SESS: CPT

## 2017-02-13 PROCEDURE — 0399T ADULT TRANSTHORACIC ECHO COMPLETE: CPT | Performed by: INTERNAL MEDICINE

## 2017-02-13 PROCEDURE — 93306 TTE W/DOPPLER COMPLETE: CPT

## 2017-02-13 PROCEDURE — 78452 HT MUSCLE IMAGE SPECT MULT: CPT | Performed by: INTERNAL MEDICINE

## 2017-02-13 PROCEDURE — 25010000002 REGADENOSON 0.4 MG/5ML SOLUTION: Performed by: INTERNAL MEDICINE

## 2017-02-13 PROCEDURE — A9500 TC99M SESTAMIBI: HCPCS | Performed by: INTERNAL MEDICINE

## 2017-02-13 PROCEDURE — 93016 CV STRESS TEST SUPVJ ONLY: CPT | Performed by: INTERNAL MEDICINE

## 2017-02-13 PROCEDURE — 93017 CV STRESS TEST TRACING ONLY: CPT

## 2017-02-13 PROCEDURE — 0 TECHNETIUM SESTAMIBI: Performed by: INTERNAL MEDICINE

## 2017-02-13 PROCEDURE — 93306 TTE W/DOPPLER COMPLETE: CPT | Performed by: INTERNAL MEDICINE

## 2017-02-13 RX ADMIN — Medication 1 DOSE: at 11:37

## 2017-02-13 RX ADMIN — REGADENOSON 0.4 MG: 0.08 INJECTION, SOLUTION INTRAVENOUS at 11:37

## 2017-02-13 RX ADMIN — Medication 1 DOSE: at 08:07

## 2017-02-15 LAB
BH CV STRESS BP STAGE 1: NORMAL
BH CV STRESS DURATION MIN STAGE 1: 3
BH CV STRESS DURATION SEC STAGE 1: 0
BH CV STRESS GRADE STAGE 1: 0
BH CV STRESS HR STAGE 1: 108
BH CV STRESS METS STAGE 1: 1.8
BH CV STRESS PROTOCOL 1: NORMAL
BH CV STRESS RECOVERY BP: NORMAL MMHG
BH CV STRESS RECOVERY HR: 88 BPM
BH CV STRESS SPEED STAGE 1: 1
BH CV STRESS STAGE 1: 1
LV EF NUC BP: 60 %
MAXIMAL PREDICTED HEART RATE: 172 BPM
PERCENT MAX PREDICTED HR: 62.79 %
STRESS BASELINE BP: NORMAL MMHG
STRESS BASELINE HR: 85 BPM
STRESS PERCENT HR: 74 %
STRESS POST ESTIMATED WORKLOAD: 1.8 METS
STRESS POST EXERCISE DUR MIN: 3 MIN
STRESS POST EXERCISE DUR SEC: 0 SEC
STRESS POST PEAK BP: NORMAL MMHG
STRESS POST PEAK HR: 108 BPM
STRESS TARGET HR: 146 BPM

## 2017-02-17 ENCOUNTER — APPOINTMENT (OUTPATIENT)
Dept: CARDIOLOGY | Facility: HOSPITAL | Age: 47
End: 2017-02-17
Attending: INTERNAL MEDICINE

## 2017-03-08 ENCOUNTER — OFFICE VISIT (OUTPATIENT)
Dept: CARDIOLOGY | Facility: CLINIC | Age: 47
End: 2017-03-08

## 2017-03-08 VITALS
HEIGHT: 68 IN | DIASTOLIC BLOOD PRESSURE: 78 MMHG | SYSTOLIC BLOOD PRESSURE: 130 MMHG | WEIGHT: 293 LBS | BODY MASS INDEX: 44.41 KG/M2 | HEART RATE: 98 BPM

## 2017-03-08 DIAGNOSIS — E78.5 HYPERLIPIDEMIA, UNSPECIFIED HYPERLIPIDEMIA TYPE: Primary | ICD-10-CM

## 2017-03-08 DIAGNOSIS — E66.9 ADIPOSITY: ICD-10-CM

## 2017-03-08 PROBLEM — R12 HEARTBURN: Status: ACTIVE | Noted: 2017-03-08

## 2017-03-08 PROBLEM — E11.9 TYPE 2 DIABETES MELLITUS (HCC): Status: ACTIVE | Noted: 2017-03-08

## 2017-03-08 PROCEDURE — 99212 OFFICE O/P EST SF 10 MIN: CPT | Performed by: INTERNAL MEDICINE

## 2017-03-08 RX ORDER — HYDROCODONE BITARTRATE AND ACETAMINOPHEN 10; 325 MG/1; MG/1
1 TABLET ORAL EVERY 6 HOURS PRN
COMMUNITY

## 2017-03-08 NOTE — PROGRESS NOTES
" Subjective:       Bisi Perla is a 46 y.o. female who here for follow up    CC  Follow-up after the stress test and echocardiogram  HPI  46-year-old female underwent stress test and echocardiogram last week without any function complications     Problem List Items Addressed This Visit        Cardiovascular and Mediastinum    Hyperlipidemia - Primary       Digestive    Adiposity        Previous treatments/evaluations include: ASA and beta blocker. Cardiac risk factors: advanced age (older than 55 for men, 65 for women).    The following portions of the patient's history were reviewed and updated as appropriate: allergies, current medications, past family history, past medical history, past social history, past surgical history and problem list.    Past Medical History   Diagnosis Date   • Bipolar 1 disorder    • Diabetes mellitus    • GERD (gastroesophageal reflux disease)    • History of transfusion    • Hyperlipidemia    • Hypertension    • Hypothyroidism 6/21/2016   • Neuropathy    • Obesity     reports that she has quit smoking. She started smoking about 31 years ago. She smoked 1.00 pack per day. She quit smokeless tobacco use about 8 years ago. She reports that she drinks about 0.6 oz of alcohol per week  She reports that she does not use illicit drugs.  Family History   Problem Relation Age of Onset   • Hypertension Father    • Heart disease Father        Review of Systems  Constitutional: No wt loss, fever, fatigue  Gastrointestinal: No nausea, abdominal pain  Behavioral/Psych: No insomnia or anxiety   Cardiovascular no chest pains and tightness in chest  Objective:       Physical Exam           Physical Exam  Visit Vitals   • /78 (BP Location: Left arm, Patient Position: Sitting)   • Pulse 98   • Ht 68\" (172.7 cm)   • Wt 300 lb (136 kg)   • BMI 45.61 kg/m2       General appearance: NAD, conversant   Eyes: anicteric sclerae, moist conjunctivae; no lid-lag; PERRLA   HENT: Atraumatic; oropharynx clear " with moist mucous membranes and no mucosal ulcerations;  normal hard and soft palate   Neck: Trachea midline; FROM, supple, no thyromegaly or lymphadenopathy   Lungs: CTA, with normal respiratory effort and no intercostal retractions   CV: S1-S2 regular, no murmurs, no rub, no gallop   Abdomen: Soft, non-tender; no masses or HSM   Extremities: No peripheral edema or extremity lymphadenopathy  Skin: Normal temperature, turgor and texture; no rash, ulcers or subcutaneous nodules   Psych: Appropriate affect, alert and oriented to person, place and time           Cardiographics  @Procedures    Echocardiogram:        Current Outpatient Prescriptions:   •  HYDROcodone-acetaminophen (NORCO)  MG per tablet, Take 1 tablet by mouth Every 6 (Six) Hours As Needed for moderate pain (4-6)., Disp: , Rfl:   •  cyclobenzaprine (FLEXERIL) 10 MG tablet, , Disp: , Rfl:   •  diclofenac (VOLTAREN) 75 MG EC tablet, Take 75 mg by mouth 2 (two) times a day as needed., Disp: , Rfl:   •  divalproex (DEPAKOTE) 500 MG 24 hr tablet, Take 1,500 mg by mouth every night., Disp: , Rfl:   •  FLUoxetine (PROzac) 20 MG capsule, Take 60 mg by mouth daily., Disp: , Rfl:   •  gabapentin (NEURONTIN) 600 MG tablet, Take 600 mg by mouth 3 (three) times a day., Disp: , Rfl:   •  HUMALOG KWIKPEN 100 UNIT/ML solution pen-injector, , Disp: , Rfl:   •  hydrochlorothiazide (HYDRODIURIL) 25 MG tablet, Take 25 mg by mouth daily., Disp: , Rfl:   •  insulin glargine (LANTUS) 100 UNIT/ML injection, Inject 18 Units under the skin every night., Disp: , Rfl:   •  INVOKANA 300 MG tablet, , Disp: , Rfl:   •  levofloxacin (LEVAQUIN) 500 MG tablet, Take 1 tablet by mouth daily., Disp: 10 tablet, Rfl: 0  •  Liraglutide (VICTOZA) 18 MG/3ML solution pen-injector, DIAL AND INJECT SUBCUTANEOUSLY 1.8MG DAILY, Disp: , Rfl:   •  omeprazole (PriLOSEC) 40 MG capsule, TAKE ONE CAPSULE BY MOUTH ONE TIME A DAY, Disp: 14 capsule, Rfl: 0  •  pioglitazone (ACTOS) 45 MG tablet, Take 45  mg by mouth daily., Disp: , Rfl:   •  QUEtiapine XR (SEROquel XR) 400 MG 24 hr tablet, Take 400 mg by mouth every night., Disp: , Rfl:    Assessment:        Patient Active Problem List   Diagnosis   • Left-sided weakness   • Abnormal cardiac function test   • Hyperlipidemia   • Hypothyroidism   • Heartburn   • Adiposity   • Type 2 diabetes mellitus     Interpretation Summary   · Findings consistent with a normal ECG stress test.  · Diaphragmatic attenuation artifact is present. Inferior wall artifact is seen  · Left ventricular ejection fraction is normal (Calculated EF = 60%).  · Myocardial perfusion imaging indicates a normal myocardial perfusion study with no evidence of ischemia.  · Impressions are consistent with a low risk study.         Interpretation Summary   · Normal global strain -20  · Bubble study neg for PFO  · All left ventricular wall segments contract normally.  · Left Ventricle: Calculated EF = 67.6%.  · Trace-to-mild mitral valve regurgitation  · There is no evidence of pericardial effusion     Specificity and sensitivity of the stress test has been explained. Pt has been explained if  Symptoms continue please go to ER, and further w/p will be required.        Plan:       Clinically no signs and symptoms of angina or chf, no side effects with current meds,.    Continue current meds and treatment.        ICD-10-CM ICD-9-CM   1. Hyperlipidemia, unspecified hyperlipidemia type E78.5 272.4   2. Adiposity E66.9 278.02     See in 1 ye  COUNSELING:    Bisi Hawkins was given to patient for the following topics: diagnostic results, risk factor reductions, impressions, risks and benefits of treatment options and importance of treatment compliance .       SMOKING COUNSELING:    Counseling given: Not Answered      EMR Dragon/Transcription disclaimer:   Much of this encounter note is an electronic transcription/translation of spoken language to printed text. The electronic translation of spoken  language may permit erroneous, or at times, nonsensical words or phrases to be inadvertently transcribed; Although I have reviewed the note for such errors, some may still exist.

## 2017-08-07 DIAGNOSIS — R53.82 CHRONIC FATIGUE: ICD-10-CM

## 2017-08-07 DIAGNOSIS — R12 HEARTBURN: ICD-10-CM

## 2017-08-07 DIAGNOSIS — E66.01 OBESITY, CLASS III, BMI 40-49.9 (MORBID OBESITY) (HCC): Primary | ICD-10-CM

## 2017-08-07 DIAGNOSIS — E78.5 HYPERLIPIDEMIA, UNSPECIFIED HYPERLIPIDEMIA TYPE: ICD-10-CM

## 2017-08-07 DIAGNOSIS — R06.83 SNORING: ICD-10-CM

## 2017-08-07 PROBLEM — I73.9 INTERMITTENT CLAUDICATION (HCC): Status: ACTIVE | Noted: 2017-08-07

## 2017-08-07 PROBLEM — D64.9 ANEMIA: Status: ACTIVE | Noted: 2017-08-07

## 2017-08-07 PROBLEM — F31.9 BIPOLAR 1 DISORDER (HCC): Status: ACTIVE | Noted: 2017-08-07

## 2017-08-07 PROBLEM — F32.A DEPRESSION: Status: ACTIVE | Noted: 2017-08-07

## 2017-08-07 PROBLEM — M25.50 MULTIPLE JOINT PAIN: Status: ACTIVE | Noted: 2017-08-07

## 2017-08-07 PROBLEM — R13.10 DYSPHAGIA: Status: ACTIVE | Noted: 2017-08-07

## 2017-08-08 ENCOUNTER — LAB (OUTPATIENT)
Dept: LAB | Facility: HOSPITAL | Age: 47
End: 2017-08-08

## 2017-08-08 ENCOUNTER — CONSULT (OUTPATIENT)
Dept: BARIATRICS/WEIGHT MGMT | Facility: CLINIC | Age: 47
End: 2017-08-08

## 2017-08-08 VITALS
HEART RATE: 107 BPM | HEIGHT: 68 IN | TEMPERATURE: 97.9 F | DIASTOLIC BLOOD PRESSURE: 86 MMHG | WEIGHT: 283 LBS | SYSTOLIC BLOOD PRESSURE: 134 MMHG | RESPIRATION RATE: 16 BRPM | BODY MASS INDEX: 42.89 KG/M2

## 2017-08-08 DIAGNOSIS — Z79.4 TYPE 2 DIABETES MELLITUS WITH COMPLICATION, WITH LONG-TERM CURRENT USE OF INSULIN (HCC): ICD-10-CM

## 2017-08-08 DIAGNOSIS — R13.10 DYSPHAGIA, UNSPECIFIED TYPE: ICD-10-CM

## 2017-08-08 DIAGNOSIS — R53.82 CHRONIC FATIGUE: ICD-10-CM

## 2017-08-08 DIAGNOSIS — R06.83 SNORING: ICD-10-CM

## 2017-08-08 DIAGNOSIS — R12 HEARTBURN: ICD-10-CM

## 2017-08-08 DIAGNOSIS — E78.5 HYPERLIPIDEMIA, UNSPECIFIED HYPERLIPIDEMIA TYPE: ICD-10-CM

## 2017-08-08 DIAGNOSIS — M25.50 MULTIPLE JOINT PAIN: ICD-10-CM

## 2017-08-08 DIAGNOSIS — E66.01 OBESITY, CLASS III, BMI 40-49.9 (MORBID OBESITY) (HCC): Primary | ICD-10-CM

## 2017-08-08 DIAGNOSIS — E66.01 OBESITY, CLASS III, BMI 40-49.9 (MORBID OBESITY) (HCC): ICD-10-CM

## 2017-08-08 DIAGNOSIS — F33.9 RECURRENT MAJOR DEPRESSIVE DISORDER, REMISSION STATUS UNSPECIFIED (HCC): ICD-10-CM

## 2017-08-08 DIAGNOSIS — E11.8 TYPE 2 DIABETES MELLITUS WITH COMPLICATION, WITH LONG-TERM CURRENT USE OF INSULIN (HCC): ICD-10-CM

## 2017-08-08 LAB
ALBUMIN SERPL-MCNC: 3.5 G/DL (ref 3.5–5.2)
ALBUMIN/GLOB SERPL: 0.9 G/DL
ALP SERPL-CCNC: 71 U/L (ref 39–117)
ALT SERPL W P-5'-P-CCNC: 14 U/L (ref 1–33)
ANION GAP SERPL CALCULATED.3IONS-SCNC: 10.3 MMOL/L
AST SERPL-CCNC: 18 U/L (ref 1–32)
BASOPHILS # BLD AUTO: 0.02 10*3/MM3 (ref 0–0.2)
BASOPHILS NFR BLD AUTO: 0.3 % (ref 0–1.5)
BILIRUB SERPL-MCNC: 0.3 MG/DL (ref 0.1–1.2)
BUN BLD-MCNC: 12 MG/DL (ref 6–20)
BUN/CREAT SERPL: 15.2 (ref 7–25)
CALCIUM SPEC-SCNC: 9.5 MG/DL (ref 8.6–10.5)
CHLORIDE SERPL-SCNC: 99 MMOL/L (ref 98–107)
CHOLEST SERPL-MCNC: 212 MG/DL (ref 0–200)
CO2 SERPL-SCNC: 30.7 MMOL/L (ref 22–29)
CREAT BLD-MCNC: 0.79 MG/DL (ref 0.57–1)
DEPRECATED RDW RBC AUTO: 55.3 FL (ref 37–54)
EOSINOPHIL # BLD AUTO: 0.13 10*3/MM3 (ref 0–0.7)
EOSINOPHIL NFR BLD AUTO: 1.7 % (ref 0.3–6.2)
ERYTHROCYTE [DISTWIDTH] IN BLOOD BY AUTOMATED COUNT: 16.2 % (ref 11.7–13)
GFR SERPL CREATININE-BSD FRML MDRD: 77 ML/MIN/1.73
GLOBULIN UR ELPH-MCNC: 3.8 GM/DL
GLUCOSE BLD-MCNC: 98 MG/DL (ref 65–99)
HBA1C MFR BLD: 6.11 % (ref 4.8–5.6)
HCT VFR BLD AUTO: 43.9 % (ref 35.6–45.5)
HDLC SERPL-MCNC: 46 MG/DL (ref 40–60)
HGB BLD-MCNC: 14 G/DL (ref 11.9–15.5)
IMM GRANULOCYTES # BLD: 0 10*3/MM3 (ref 0–0.03)
IMM GRANULOCYTES NFR BLD: 0 % (ref 0–0.5)
LDLC SERPL CALC-MCNC: 136 MG/DL (ref 0–100)
LDLC/HDLC SERPL: 2.96 {RATIO}
LYMPHOCYTES # BLD AUTO: 3.72 10*3/MM3 (ref 0.9–4.8)
LYMPHOCYTES NFR BLD AUTO: 48.1 % (ref 19.6–45.3)
MCH RBC QN AUTO: 29.5 PG (ref 26.9–32)
MCHC RBC AUTO-ENTMCNC: 31.9 G/DL (ref 32.4–36.3)
MCV RBC AUTO: 92.4 FL (ref 80.5–98.2)
MONOCYTES # BLD AUTO: 0.72 10*3/MM3 (ref 0.2–1.2)
MONOCYTES NFR BLD AUTO: 9.3 % (ref 5–12)
NEUTROPHILS # BLD AUTO: 3.15 10*3/MM3 (ref 1.9–8.1)
NEUTROPHILS NFR BLD AUTO: 40.6 % (ref 42.7–76)
PLATELET # BLD AUTO: 204 10*3/MM3 (ref 140–500)
PMV BLD AUTO: 9.6 FL (ref 6–12)
POTASSIUM BLD-SCNC: 3.7 MMOL/L (ref 3.5–5.2)
PROT SERPL-MCNC: 7.3 G/DL (ref 6–8.5)
RBC # BLD AUTO: 4.75 10*6/MM3 (ref 3.9–5.2)
SODIUM BLD-SCNC: 140 MMOL/L (ref 136–145)
TRIGL SERPL-MCNC: 149 MG/DL (ref 0–150)
TSH SERPL DL<=0.05 MIU/L-ACNC: 0.8 MIU/ML (ref 0.27–4.2)
VLDLC SERPL-MCNC: 29.8 MG/DL (ref 5–40)
WBC NRBC COR # BLD: 7.74 10*3/MM3 (ref 4.5–10.7)

## 2017-08-08 PROCEDURE — 85025 COMPLETE CBC W/AUTO DIFF WBC: CPT

## 2017-08-08 PROCEDURE — 99205 OFFICE O/P NEW HI 60 MIN: CPT | Performed by: NURSE PRACTITIONER

## 2017-08-08 PROCEDURE — 83036 HEMOGLOBIN GLYCOSYLATED A1C: CPT

## 2017-08-08 PROCEDURE — 36415 COLL VENOUS BLD VENIPUNCTURE: CPT

## 2017-08-08 PROCEDURE — 80061 LIPID PANEL: CPT

## 2017-08-08 PROCEDURE — 94690 O2 UPTK REST INDIRECT: CPT | Performed by: NURSE PRACTITIONER

## 2017-08-08 PROCEDURE — 80053 COMPREHEN METABOLIC PANEL: CPT

## 2017-08-08 PROCEDURE — 84443 ASSAY THYROID STIM HORMONE: CPT

## 2017-08-08 RX ORDER — ATORVASTATIN CALCIUM 20 MG/1
20 TABLET, FILM COATED ORAL DAILY
COMMUNITY

## 2017-08-08 NOTE — PROGRESS NOTES
[facility]IATRIC SURGERY  [address]  [facility] [id number] [phone]  [address]  [facility] [address] [phone]  Dept: [phone]  8/8/2017      Bisi Perla.  [id number]  [id number]  1970  female      Chief Complaint of weight gain; unable to maintain weight loss    History of Present Illness:   Bisi is a 47 y.o. female who presents today for evaluation, education and consultation regarding weight loss surgery. The patient is interested in the sleeve gastrectomy.      Diet History:Bisi has been overweight for at least 47 years, has been 35 pounds or more overweight for at least 27 years, has been 100 pounds or more overweight for 27 or more years and started dieting at age 25.  The most weight Bisi lost was 5 pounds on increasing water intake and decreasing calories and maintained the weight loss for 2 weeks. Bisi describes her eating habits as mindless eating, snacking, emotional eating, over-consumption at meal times, soda drinker (4 sodas per day avg), caffeine and sweet lover. Bisi Perla has tried Weight Watchers, Nutrisystem, fasting, decreased carbohydrate diet among others with success of losing up to 5 pounds, but in each instance regained the weight.      Bariatric Surgery Evaluation: The patient is being seen for an initial visit for bariatric surgery evaluation.     Bariatric Co-morbidities:  sleep disturbances with possible sleep apnea, diabetes, hypertension, dyslipidemia, back pain, knee pain, GERD, menstrual irregularities, depression and mental health disease    Patient Active Problem List   Diagnosis   • Left-sided weakness   • Abnormal cardiac function test   • Hyperlipidemia   • Hypothyroidism   • Heartburn   • Obesity, Class III, BMI 40-49.9 (morbid obesity)   • Type 2 diabetes mellitus   • Chronic fatigue   • Intermittent claudication   • Snoring   • Dysphagia   • Multiple joint pain   •  Depression   • Bipolar 1 disorder   • Anemia   • Vitamin D deficiency       Past Medical History:   Diagnosis Date   • Bipolar 1 disorder    • Diabetes mellitus    • GERD (gastroesophageal reflux disease)    • History of transfusion    • Hyperlipidemia    • Hypertension    • Hypothyroidism 6/21/2016   • Neuropathy    • Obesity        Past Surgical History:   Procedure Laterality Date   • BACK SURGERY  1994   • BREAST BIOPSY Right    • KNEE SURGERY     • NECK SURGERY  2012       Allergies   Allergen Reactions   • Penicillins Hives, Itching and Shortness Of Breath   • Oxycodone-Acetaminophen Itching   • Codeine    • Metformin          Current Outpatient Prescriptions:   •  atorvastatin (LIPITOR) 20 MG tablet, Take 20 mg by mouth Daily., Disp: , Rfl:   •  cyclobenzaprine (FLEXERIL) 10 MG tablet, , Disp: , Rfl:   •  diclofenac (VOLTAREN) 75 MG EC tablet, Take 75 mg by mouth 2 (two) times a day as needed., Disp: , Rfl:   •  divalproex (DEPAKOTE) 500 MG 24 hr tablet, Take 1,500 mg by mouth every night., Disp: , Rfl:   •  FLUoxetine (PROzac) 20 MG capsule, Take 60 mg by mouth daily., Disp: , Rfl:   •  gabapentin (NEURONTIN) 600 MG tablet, Take 600 mg by mouth 3 (three) times a day., Disp: , Rfl:   •  HUMALOG KWIKPEN 100 UNIT/ML solution pen-injector, , Disp: , Rfl:   •  hydrochlorothiazide (HYDRODIURIL) 25 MG tablet, Take 25 mg by mouth daily., Disp: , Rfl:   •  HYDROcodone-acetaminophen (NORCO)  MG per tablet, Take 1 tablet by mouth Every 6 (Six) Hours As Needed for moderate pain (4-6)., Disp: , Rfl:   •  insulin glargine (LANTUS) 100 UNIT/ML injection, Inject 18 Units under the skin every night., Disp: , Rfl:   •  INVOKANA 300 MG tablet, , Disp: , Rfl:   •  Liraglutide (VICTOZA) 18 MG/3ML solution pen-injector, DIAL AND INJECT SUBCUTANEOUSLY 1.8MG DAILY, Disp: , Rfl:   •  omeprazole (PriLOSEC) 40 MG capsule, TAKE ONE CAPSULE BY MOUTH ONE TIME A DAY, Disp: 14 capsule, Rfl: 0  •  pioglitazone (ACTOS) 45 MG tablet,  Take 45 mg by mouth daily., Disp: , Rfl:   •  QUEtiapine XR (SEROquel XR) 400 MG 24 hr tablet, Take 400 mg by mouth every night., Disp: , Rfl:     Social History     Social History   • Marital status: Single     Spouse name: N/A   • Number of children: N/A   • Years of education: N/A     Occupational History   • Not on file.     Social History Main Topics   • Smoking status: Former Smoker     Packs/day: 1.00     Start date: 1986   • Smokeless tobacco: Former User     Quit date: 2009   • Alcohol use 0.6 oz/week     1 Shots of liquor per week      Comment: per year   • Drug use: No   • Sexual activity: Defer     Other Topics Concern   • Not on file     Social History Narrative       Family History   Problem Relation Age of Onset   • Hypertension Father    • Heart disease Father          Review of Systems:  Review of Systems   Constitutional: Negative for fatigue and unexpected weight change.   HENT: Negative.    Eyes: Negative.    Respiratory: Negative.    Cardiovascular: Negative.  Negative for leg swelling.   Gastrointestinal: Negative for abdominal distention, abdominal pain, constipation, diarrhea, nausea and vomiting.   Genitourinary: Negative for difficulty urinating, frequency and urgency.   Musculoskeletal: Negative for back pain.   Skin: Negative.    Psychiatric/Behavioral: Negative.    All other systems reviewed and are negative.      Physical Exam:  Vital Signs:  Weight: 283 lb (128 kg)   Body mass index is 43.03 kg/(m^2).  Temp: 97.9 °F (36.6 °C)   Heart Rate: 107   BP: 134/86     Physical Exam   Constitutional: She is oriented to person, place, and time. She appears well-developed and well-nourished.   HENT:   Head: Normocephalic and atraumatic.   Neck: Normal range of motion.   Cardiovascular: Normal rate, regular rhythm and normal heart sounds.    Pulmonary/Chest: Effort normal and breath sounds normal. No respiratory distress. She has no wheezes.   Abdominal: Soft. Bowel sounds are normal. She exhibits  no distension. There is no tenderness.   Musculoskeletal: She exhibits no edema or deformity.   Neurological: She is alert and oriented to person, place, and time.   Skin: Skin is warm and dry.   Psychiatric: She has a normal mood and affect. Her behavior is normal.   Nursing note and vitals reviewed.         Assessment:         Bisi Perla is a 47 y.o. year old female with medically complicated severe obesity. Weight: 283 lb (128 kg), Body mass index is 43.03 kg/(m^2). and weight related problems including sleep disturbances with possible sleep apnea, diabetes, hypertension, dyslipidemia, back pain, knee pain, GERD, menstrual irregularities, depression and mental health disease.    I explained in detail the procedures that we are performing.  All of those procedures can be performed laparoscopically but there is a chance to convert to open if any technical challenges or complications do occur.  Bariatric surgery is not cosmetic surgery but rather a tool to help a patient make a life-long commitment lifestyle changes including diet, exercise, behavior changes, and taking supplemental vitamins and minerals.    Due to the patient's BMI and co-morbidities they are at a high risk for surgery and will obtain the following:  The patient has been advised that a letter of medical support and a history and physical must be obtained from her primary care physician. A psychological evaluation will be arranged for this patient. CBC, CMP, FLP, TSH and HgbA1C will be drawn. Bisi Perla will obtain a pre-operative CXR and EKG. Bisi Perla will obtain clearance from a cardiologist prior to surgery.     Bisi Perla will be set up for a pre-operative diagnostic esophagogastroduodenoscopy with biopsy for evaluation. The risks and benefits of the procedure were discussed with the patient in detail and all questions were answered.  Possibility of perforation, bleeding, aspiration, anoxic brain injury, respiratory and/or  cardiac arrest and death were discussed.   She received handouts regarding, all questions were answered and informed consent was obtained.     The risks, benefits, alternatives, and potential complications of all of the procedures were explained in detail including, but not limited to death, anesthesia and medication adverse effect/DVT, pulmonary embolism, trocar site/incisional hernia, wound infection, abdominal infection, bleeding, failure to lose weight or gain weight and change in body image, metabolic complications with calcium, thiamine, vitamin B12, folate, iron, and anemia.    The patient was advised to start a high protein, low fat and low carbohydrate diet. The patient was given individualized information by our dietician along with general group information and handouts.     The patient had the Basal Metabolic Rate test performed in our office today then I reviewed the results with them including changes to help increase metabolism and a recommended daily caloric intake range- see scanned results.     The patient was given information regarding the SARA educational video. SARA is an internet based educational video which explains the surgical procedure and answers basic questions regarding the procedure. The patient was provided with instructions and a password to watch the video.    The patient was encouraged to start routine exercise including but not limited to 150 minutes per week. The patient received a resistance band along with a handout of exercises.     The consultation plan was reviewed with the patient.    The patient understands the surgical procedures and the different surgical options that are available.  She understands the lifestyle changes that would be required after surgery and has agreed to participate in a pre-operative and postoperative weight management program.  She also expressed understanding of possible risks, had several questions answered and desires to proceed.    I think she  is a good candidate for this surgery, and is interested in a sleeve gastrectomy.    Encounter Diagnoses   Name Primary?   • Obesity, Class III, BMI 40-49.9 (morbid obesity) Yes   • Hyperlipidemia, unspecified hyperlipidemia type    • Snoring    • Heartburn    • Dysphagia, unspecified type    • Chronic fatigue    • Type 2 diabetes mellitus with complication, with long-term current use of insulin    • Multiple joint pain    • Recurrent major depressive disorder, remission status unspecified        Plan:    Patient will have evaluations and follow up with bariatric dieticians and a psychologist before undergoing a multidisciplinary review of her candidacy.  We also discussed the weight loss requirement and rationale, and other program requirements.      Cecille Briseno, APRN  8/8/2017

## 2017-08-11 NOTE — PROGRESS NOTES
"Bariatric Nutrition Counseling Interview    Patient Name:  Bisi Perla  YOB: 1970  Age:  47 y.o.  Sex:  female  MRN: [id number]  Date:  08/11/17    Procedure Considering:  Sleeve    Last Documented Height:    Ht Readings from Last 1 Encounters:   08/08/17 68\" (172.7 cm)     Last Documented Weight:   Wt Readings from Last 1 Encounters:   08/08/17 283 lb (128 kg)      Body mass index is 43.03 kg/(m^2).    Highest Weight:  284 lb.  Goal Weight: 168 lb.    History:  Past Medical History:   Diagnosis Date   • Bipolar 1 disorder    • Diabetes mellitus    • GERD (gastroesophageal reflux disease)    • History of transfusion    • Hyperlipidemia    • Hypertension    • Hypothyroidism 6/21/2016   • Neuropathy    • Obesity      Past Surgical History:   Procedure Laterality Date   • BACK SURGERY  1994   • BREAST BIOPSY Right    • KNEE SURGERY     • NECK SURGERY  2012     Family History   Problem Relation Age of Onset   • Hypertension Father    • Heart disease Father      Social History     Social History   • Marital status: Single     Spouse name: N/A   • Number of children: N/A   • Years of education: N/A     Social History Main Topics   • Smoking status: Former Smoker     Packs/day: 1.00     Start date: 1986   • Smokeless tobacco: Former User     Quit date: 2009   • Alcohol use 0.6 oz/week     1 Shots of liquor per week      Comment: per year   • Drug use: No   • Sexual activity: Defer     Other Topics Concern   • None     Social History Narrative     Additional Health Issues to Consider:  Depression, Diabetes,Hypothyroidism, joint pain    Weight History:  Always been overweight    Previous Weight Loss Efforts:  Weight Watchers  Most Successful Weight Loss Effort:  Weight Watchers (28 lb. loss, not sustained)    Eating Habits: Eat large portions, Eat too fast, Snacker  Eat three meals on most days?  No  Worst eating habit? Perpetual snacking on high calorie foods.    How often do you eat fast food? five times " weekly    Do you exercise regularly? (at least 3 times each week)  No    Occupation:  Unemployed, Minimal physical activity on a routine basis    Personal Goal After Procedure:  To improve stamina and be more active   Personal Support:  mother    Assessment:  We reviewed program requirements for success following weight loss following surgery. We discussed personal habits and lifestyle behaviors that may influence diet efforts. Program materials including a reduced calorie diet were provided, discussed and recommended as the regimen to follow for pre and post diet planning. Bisi demonstrated a good comprehension of diet requirements as well as a commitment to work on personal challenges. She will make a good candidate for weight loss surgery.    Electronically signed by:  Salima May RD  08/11/17 8:09 AM

## 2017-09-13 ENCOUNTER — HOSPITAL ENCOUNTER (OUTPATIENT)
Dept: CARDIOLOGY | Facility: HOSPITAL | Age: 47
Discharge: HOME | End: 2017-09-13
Admitting: NURSE PRACTITIONER

## 2017-09-13 ENCOUNTER — LAB REQUISITION (OUTPATIENT)
Dept: LAB | Facility: HOSPITAL | Age: 47
End: 2017-09-13

## 2017-09-13 ENCOUNTER — OUTSIDE FACILITY SERVICE (OUTPATIENT)
Dept: BARIATRICS/WEIGHT MGMT | Facility: CLINIC | Age: 47
End: 2017-09-13

## 2017-09-13 ENCOUNTER — HOSPITAL ENCOUNTER (OUTPATIENT)
Dept: GENERAL RADIOLOGY | Facility: HOSPITAL | Age: 47
Discharge: HOME | End: 2017-09-13

## 2017-09-13 DIAGNOSIS — R12 HEARTBURN: ICD-10-CM

## 2017-09-13 DIAGNOSIS — E66.01 OBESITY, CLASS III, BMI 40-49.9 (MORBID OBESITY) (HCC): ICD-10-CM

## 2017-09-13 DIAGNOSIS — R13.10 DYSPHAGIA, UNSPECIFIED TYPE: ICD-10-CM

## 2017-09-13 DIAGNOSIS — R53.82 CHRONIC FATIGUE: ICD-10-CM

## 2017-09-13 DIAGNOSIS — R06.83 SNORING: ICD-10-CM

## 2017-09-13 DIAGNOSIS — E78.5 HYPERLIPIDEMIA, UNSPECIFIED HYPERLIPIDEMIA TYPE: ICD-10-CM

## 2017-09-13 DIAGNOSIS — Z00.00 ENCOUNTER FOR GENERAL ADULT MEDICAL EXAMINATION WITHOUT ABNORMAL FINDINGS: ICD-10-CM

## 2017-09-13 PROCEDURE — 71020 HC CHEST PA AND LATERAL: CPT

## 2017-09-13 PROCEDURE — 93010 ELECTROCARDIOGRAM REPORT: CPT | Performed by: INTERNAL MEDICINE

## 2017-09-13 PROCEDURE — 93005 ELECTROCARDIOGRAM TRACING: CPT | Performed by: NURSE PRACTITIONER

## 2017-09-13 PROCEDURE — 88305 TISSUE EXAM BY PATHOLOGIST: CPT | Performed by: SURGERY

## 2017-09-13 PROCEDURE — 88312 SPECIAL STAINS GROUP 1: CPT | Performed by: SURGERY

## 2017-09-13 PROCEDURE — 43239 EGD BIOPSY SINGLE/MULTIPLE: CPT | Performed by: SURGERY

## 2017-09-13 PROCEDURE — 87081 CULTURE SCREEN ONLY: CPT | Performed by: SURGERY

## 2017-09-14 LAB
CYTO UR: NORMAL
LAB AP CASE REPORT: NORMAL
LAB AP CLINICAL INFORMATION: NORMAL
Lab: NORMAL
PATH REPORT.FINAL DX SPEC: NORMAL
PATH REPORT.GROSS SPEC: NORMAL
UREASE TISS QL: NEGATIVE

## 2017-10-02 ENCOUNTER — TELEPHONE (OUTPATIENT)
Dept: CARDIOLOGY | Facility: CLINIC | Age: 47
End: 2017-10-02

## 2017-10-02 ENCOUNTER — PREP FOR SURGERY (OUTPATIENT)
Dept: BARIATRICS/WEIGHT MGMT | Facility: CLINIC | Age: 47
End: 2017-10-02

## 2017-10-02 DIAGNOSIS — E66.01 OBESITY, CLASS III, BMI 40-49.9 (MORBID OBESITY) (HCC): Primary | ICD-10-CM

## 2017-10-02 RX ORDER — SODIUM CHLORIDE, SODIUM LACTATE, POTASSIUM CHLORIDE, CALCIUM CHLORIDE 600; 310; 30; 20 MG/100ML; MG/100ML; MG/100ML; MG/100ML
100 INJECTION, SOLUTION INTRAVENOUS CONTINUOUS
Status: CANCELLED | OUTPATIENT
Start: 2017-10-23

## 2017-10-02 RX ORDER — PANTOPRAZOLE SODIUM 40 MG/10ML
40 INJECTION, POWDER, LYOPHILIZED, FOR SOLUTION INTRAVENOUS ONCE
Status: CANCELLED | OUTPATIENT
Start: 2017-10-02

## 2017-10-02 RX ORDER — SCOLOPAMINE TRANSDERMAL SYSTEM 1 MG/1
1 PATCH, EXTENDED RELEASE TRANSDERMAL ONCE
Status: CANCELLED | OUTPATIENT
Start: 2017-10-23 | End: 2017-10-02

## 2017-10-02 RX ORDER — CHLORHEXIDINE GLUCONATE 0.12 MG/ML
15 RINSE ORAL SEE ADMIN INSTRUCTIONS
Status: CANCELLED | OUTPATIENT
Start: 2017-10-23

## 2017-10-02 RX ORDER — CLINDAMYCIN PHOSPHATE 900 MG/50ML
900 INJECTION INTRAVENOUS ONCE
Status: CANCELLED | OUTPATIENT
Start: 2017-10-23 | End: 2017-10-02

## 2017-10-02 NOTE — TELEPHONE ENCOUNTER
----- Message from Cammie Trujillo sent at 9/28/2017  8:36 AM EDT -----  Regarding: CARDIAC CLEARANCE   Contact: [phone]  GASTRIC SLEEVE BY DR GOFF-NOT SCHEDULED YET      Clearance letter sent by Bisi CRYSTAL to dr ward office

## 2017-10-05 ENCOUNTER — CONSULT (OUTPATIENT)
Dept: BARIATRICS/WEIGHT MGMT | Facility: CLINIC | Age: 47
End: 2017-10-05

## 2017-10-05 VITALS
DIASTOLIC BLOOD PRESSURE: 83 MMHG | SYSTOLIC BLOOD PRESSURE: 127 MMHG | WEIGHT: 275 LBS | BODY MASS INDEX: 41.68 KG/M2 | TEMPERATURE: 98 F | HEART RATE: 116 BPM | HEIGHT: 68 IN | RESPIRATION RATE: 16 BRPM

## 2017-10-05 DIAGNOSIS — E55.9 VITAMIN D DEFICIENCY: ICD-10-CM

## 2017-10-05 DIAGNOSIS — M25.50 MULTIPLE JOINT PAIN: ICD-10-CM

## 2017-10-05 DIAGNOSIS — E11.8 TYPE 2 DIABETES MELLITUS WITH COMPLICATION, WITH LONG-TERM CURRENT USE OF INSULIN (HCC): ICD-10-CM

## 2017-10-05 DIAGNOSIS — Z79.4 TYPE 2 DIABETES MELLITUS WITH COMPLICATION, WITH LONG-TERM CURRENT USE OF INSULIN (HCC): ICD-10-CM

## 2017-10-05 DIAGNOSIS — R94.30 ABNORMAL CARDIAC FUNCTION TEST: ICD-10-CM

## 2017-10-05 DIAGNOSIS — E78.5 HYPERLIPIDEMIA, UNSPECIFIED HYPERLIPIDEMIA TYPE: ICD-10-CM

## 2017-10-05 DIAGNOSIS — R53.1 LEFT-SIDED WEAKNESS: ICD-10-CM

## 2017-10-05 DIAGNOSIS — E66.01 OBESITY, CLASS III, BMI 40-49.9 (MORBID OBESITY) (HCC): Primary | ICD-10-CM

## 2017-10-05 DIAGNOSIS — R53.82 CHRONIC FATIGUE: ICD-10-CM

## 2017-10-05 DIAGNOSIS — F31.9 BIPOLAR 1 DISORDER (HCC): ICD-10-CM

## 2017-10-05 PROBLEM — R13.10 DYSPHAGIA: Status: RESOLVED | Noted: 2017-08-07 | Resolved: 2017-10-05

## 2017-10-05 PROCEDURE — 99215 OFFICE O/P EST HI 40 MIN: CPT | Performed by: SURGERY

## 2017-10-05 RX ORDER — FOLIC ACID 1 MG/1
1 TABLET ORAL DAILY
Qty: 40 TABLET | Refills: 0 | Status: SHIPPED | OUTPATIENT
Start: 2017-10-05 | End: 2018-05-01

## 2017-10-05 RX ORDER — URSODIOL 300 MG/1
300 CAPSULE ORAL 2 TIMES DAILY
Qty: 60 CAPSULE | Refills: 5 | Status: SHIPPED | OUTPATIENT
Start: 2017-10-05 | End: 2017-10-20

## 2017-10-05 NOTE — H&P
Bariatric Consult:  Referred by Yordan Frias MD    Bisi Perla is here today for consult on Consult (Consult for gastric sleeve)      History of Present Illness:     Bisi Perla is a 47 y.o. female with morbid obesity with co-morbidities including sleep apnea, diabetes, hypertension, dyslipidemia, osteoarthritis, back pain, knee pain, GERD and mental health disease who presents for surgical consultation for the above procedure. Bisi has completed the initial intake visit and has been examined by our nurse practitioner, dietician, psychologist and underwent the extensive educational teaching process under the guidance of our bariatric coordinator and myself. Bisi also has seen the educational video SARA on the surgical procedure if available. Bisi attended today more educational teaching from our bariatric coordinator and myself. Bisi has had an extensive medical workup including a visit with their primary care physician, EKG, chest radiograph, blood work, EGD or UGI and possibly further testing. These have been reviewed by me and discussed with the patient. Bisi is now ready to proceed with surgery. Bisi presently denies nausea, vomiting, fever, chills, chest pain, shortness of air, melena, hematochezia, hemetemesis, dysuria, frequency, hematuria, jaundice or abdominal pain.       Past Medical History:   Diagnosis Date   • Bipolar 1 disorder    • Diabetes mellitus    • GERD (gastroesophageal reflux disease)    • History of transfusion    • Hyperlipidemia    • Hypertension    • Hypothyroidism 6/21/2016   • Neuropathy    • Obesity        Encounter Diagnoses   Name Primary?   • Obesity, Class III, BMI 40-49.9 (morbid obesity) Yes   • Type 2 diabetes mellitus with complication, with long-term current use of insulin    • Chronic fatigue    • Multiple joint pain    • Vitamin D deficiency    • Hyperlipidemia, unspecified hyperlipidemia type    • Left-sided weakness    • Bipolar 1 disorder    •  Abnormal cardiac function test        Past Surgical History:   Procedure Laterality Date   • BACK SURGERY  1994   • BREAST BIOPSY Right    • KNEE SURGERY     • NECK SURGERY  2012       Patient Active Problem List   Diagnosis   • Left-sided weakness   • Abnormal cardiac function test   • Hyperlipidemia   • Hypothyroidism   • Heartburn   • Obesity, Class III, BMI 40-49.9 (morbid obesity)   • Type 2 diabetes mellitus   • Chronic fatigue   • Intermittent claudication   • Snoring   • Multiple joint pain   • Depression   • Bipolar 1 disorder   • Anemia   • Vitamin D deficiency       Allergies   Allergen Reactions   • Penicillins Hives, Itching and Shortness Of Breath   • Oxycodone-Acetaminophen Itching   • Codeine    • Metformin          Current Outpatient Prescriptions:   •  atorvastatin (LIPITOR) 20 MG tablet, Take 20 mg by mouth Daily., Disp: , Rfl:   •  cyclobenzaprine (FLEXERIL) 10 MG tablet, , Disp: , Rfl:   •  diclofenac (VOLTAREN) 75 MG EC tablet, Take 75 mg by mouth 2 (two) times a day as needed., Disp: , Rfl:   •  divalproex (DEPAKOTE) 500 MG 24 hr tablet, Take 1,500 mg by mouth every night., Disp: , Rfl:   •  FLUoxetine (PROzac) 20 MG capsule, Take 60 mg by mouth daily., Disp: , Rfl:   •  gabapentin (NEURONTIN) 600 MG tablet, Take 600 mg by mouth 3 (three) times a day., Disp: , Rfl:   •  HUMALOG KWIKPEN 100 UNIT/ML solution pen-injector, , Disp: , Rfl:   •  hydrochlorothiazide (HYDRODIURIL) 25 MG tablet, Take 25 mg by mouth daily., Disp: , Rfl:   •  HYDROcodone-acetaminophen (NORCO)  MG per tablet, Take 1 tablet by mouth Every 6 (Six) Hours As Needed for moderate pain (4-6)., Disp: , Rfl:   •  insulin glargine (LANTUS) 100 UNIT/ML injection, Inject 18 Units under the skin every night., Disp: , Rfl:   •  INVOKANA 300 MG tablet, , Disp: , Rfl:   •  Liraglutide (VICTOZA) 18 MG/3ML solution pen-injector, DIAL AND INJECT SUBCUTANEOUSLY 1.8MG DAILY, Disp: , Rfl:   •  omeprazole (PriLOSEC) 40 MG capsule, TAKE  ONE CAPSULE BY MOUTH ONE TIME A DAY, Disp: 14 capsule, Rfl: 0  •  pioglitazone (ACTOS) 45 MG tablet, Take 45 mg by mouth daily., Disp: , Rfl:   •  QUEtiapine XR (SEROquel XR) 400 MG 24 hr tablet, Take 400 mg by mouth every night., Disp: , Rfl:     Social History     Social History   • Marital status: Single     Spouse name: N/A   • Number of children: N/A   • Years of education: N/A     Occupational History   • Not on file.     Social History Main Topics   • Smoking status: Former Smoker     Packs/day: 1.00     Start date: 1986   • Smokeless tobacco: Former User     Quit date: 2009   • Alcohol use 0.6 oz/week     1 Shots of liquor per week      Comment: per year   • Drug use: No   • Sexual activity: Defer     Other Topics Concern   • Not on file     Social History Narrative       Family History   Problem Relation Age of Onset   • Hypertension Father    • Heart disease Father        Review of Systems:  Review of Systems   Constitutional: Positive for fatigue.   Musculoskeletal: Positive for arthralgias, back pain and gait problem.   All other systems reviewed and are negative.        Physical Exam:    Vital Signs:  Weight: 275 lb (125 kg)   Body mass index is 41.81 kg/(m^2).  Temp: 98 °F (36.7 °C)   Heart Rate: 116   BP: 127/83       Physical Exam   Constitutional: She is oriented to person, place, and time. She appears well-nourished.   HENT:   Head: Normocephalic and atraumatic.   Mouth/Throat: Oropharynx is clear and moist.   Eyes: Conjunctivae and EOM are normal. Pupils are equal, round, and reactive to light. No scleral icterus.   Neck: Normal range of motion. Neck supple. No thyromegaly present.   Cardiovascular: Normal rate and regular rhythm.    Pulmonary/Chest: Effort normal and breath sounds normal.   Abdominal: Soft. Bowel sounds are normal. She exhibits no distension and no mass. There is no tenderness. There is no rebound. No hernia.   Musculoskeletal: Normal range of motion.   Lymphadenopathy:     She  has no cervical adenopathy.   Neurological: She is alert and oriented to person, place, and time. No cranial nerve deficit. Coordination normal.   Skin: Skin is warm and dry. No erythema.   Psychiatric: She has a normal mood and affect. Her behavior is normal.   Vitals reviewed.        Assessment:    Bisi Perla is a 47 y.o. year old female with medically complicated severe obesity with a BMI of Body mass index is 41.81 kg/(m^2). and multiple co-morbidities listed in the encounter diagnosis.    I think she is an appropriate candidate for this surgery, and is ready to proceed.      Plan/Discussion/Summary:  No hiatal hernia per me.  Patient does take PPI.  Helicobacter pylori negative.  Gastric body polyps ×2 negative.    The patient has returned to the office for a surgical consultation and has requested to proceed with a laparoscopic gastric sleeve.  I have had the opportunity to obtain a history, examine the patient and review the patient's chart.    The patient understands that surgery is a tool and that weight loss is not guaranteed but only seen in the context of appropriate use, regular follow up, exercise and making appropriate food choices.     I personally discussed the potential complications of the laparoscopic gastric sleeve with this patient.  The patient is well aware of potential complications of the surgery that include but not limited to bleeding, infections, deep vein thrombosis, pulmonary embolism, pulmonary complications such as pneumonia, cardiac event, hernias, small bowel obstruction, damage to the spleen or other organs, bowel injury, disfiguring scars, failure to lose weight, need for additional surgery, conversion to an open procedure and death.  The patient is also aware of complications which apply in particular to the gastric sleeve and can include but not limited to the leakage of gastric contents at the staple line, the development of an intra-abdominal abscess, gastroesophageal  reflux disease, Aparicio's esophagus, ulcers, vitamin/mineral deficiencies, strictures, and the possibility of converting this procedure to a Moises-en-Y gastric bypass. The patient also understands the possibility of requiring an acid reducer medication for the rest of their life.    The risks, benefits, potential complications and alternative therapies were discussed at great length as outlined in our extensive consent forms, online consent and educational teaching processes.    The patient has confirmed the participation in the programs extensive educational activities.    All questions and concerns were answered to patient's satisfaction.  The patient now wishes to proceed with surgery.    Patient has declined the pre-operative insertion of an IVC filter.     The patient has declined a postoperative course of anitcoagulant therapy.        I instructed patient to start on a H2 blocker or proton pump inhibitor if not already on one of these medications.    I explained in detail the procedures that we perform.  All of these procedures have a chance to convert to open if any technical challenges or complications do occur.  Bariatric surgery is not cosmetic surgery but rather a tool to help a patient make a life-long commitment lifestyle change including diet, exercise, behavior changes, and taking supplemental vitamins and minerals.    Problems after surgery may require more operations to correct them.    The risks, benefits, alternatives, and potential complications of all of the procedures were explained in detail including, but not limited to death, anesthesia and medication adverse effect, deep venous thrombosis, pulmonary embolism, trocar site/incisional hernia, wound infection, abdominal infection, bleeding, failure to lose weight, gain weight, a change in body image, metabolic complications with vitamin deficiences and anemia.    Weight loss expectations were discussed with the patient in detail. The weight loss  operations most commonly performed are the sleeve gastrectomy and the Moises-en-Y gastric bypass. These operations result in weight losses up to approximately 25-35% of initial body weight 12 to 24 months after surgery with the gastric bypass usually the higher percent of weight loss. The longitudinal data shows maintenance of 75% of lost weight after 10 years for the gastric bypass.    For the gastric bypass and loop duodenal switch (KAYLIN-S) the risks include but not limited to the following early complications:  Anastomotic leak/peritonitis, Moises/Alimentary/biliopancreatic limb obstruction, severe & minor wound infection/seroma, and nausea/vomiting.  Late complications can include but are not limited to malnutrition, vitamin deficiencies, frequent loose stools,  stomal stenosis, marginal ulcer, bowel obstruction, intussusception, internal, and incisional hernia.    Regarding the gastric sleeve, there is less long-term outcome data and higher risk of dysphagia and reflux compared to a gastric bypass, as well as risk of internal visceral/organ injury, splenectomy, bleeding, infection, leak (which could require further intervention possible conversion to Moises-en-Y gastric bypass), stenosis and possibility of regaining weight.    Bisi was counseled regarding diagnostic results, instructions for management, risk factor reductions, prognosis, patient and family education, impressions, risks and benefits of treatment options and importance of compliance with treatment. Total face to face time of the encounter was over 45 minutes and over 30 minutes was spent counseling.     Norm Report   As part of this patient's treatment plan I am prescribing controlled substances. The patient has been made aware of appropriate use of such medications, including potential risk of somnolence, limited ability to drive and /or work safely, and potential for dependence or overdose. It has also been made clear that these medications are  for use by this patient only, without concomitant use of alcohol or other substances unless prescribed.    Bisi has completed prescribing agreement detailing terms of continued prescribing of controlled substances, including monitoring HILARIA reports, urine drug screening, and pill counts if necessary. Bisi is aware that inappropriate use will result in cessation of prescribing such medications.    HILARIA report has been reviewed      History and physical exam exhibit continued safe and appropriate use of controlled substances.      Bisi understands the surgical procedures and the different surgical options that are available.  She understands the lifestyle changes that are required after surgery and has agreed to follow the guidelines outlined in the weight management program.  She also expressed understanding of the risks involved and had all of female questions answered and desires to proceed.      Dusty Petit MD  10/5/2017

## 2017-10-17 ENCOUNTER — APPOINTMENT (OUTPATIENT)
Dept: PREADMISSION TESTING | Facility: HOSPITAL | Age: 47
End: 2017-10-17

## 2017-10-20 ENCOUNTER — APPOINTMENT (OUTPATIENT)
Dept: PREADMISSION TESTING | Facility: HOSPITAL | Age: 47
End: 2017-10-20

## 2017-10-20 VITALS
DIASTOLIC BLOOD PRESSURE: 74 MMHG | OXYGEN SATURATION: 97 % | HEART RATE: 82 BPM | TEMPERATURE: 98 F | RESPIRATION RATE: 16 BRPM | HEIGHT: 68 IN | SYSTOLIC BLOOD PRESSURE: 108 MMHG

## 2017-10-20 DIAGNOSIS — E66.01 OBESITY, CLASS III, BMI 40-49.9 (MORBID OBESITY) (HCC): ICD-10-CM

## 2017-10-20 LAB
ALBUMIN SERPL-MCNC: 3.7 G/DL (ref 3.5–5.2)
ALBUMIN/GLOB SERPL: 1.1 G/DL
ALP SERPL-CCNC: 69 U/L (ref 39–117)
ALT SERPL W P-5'-P-CCNC: 11 U/L (ref 1–33)
ANION GAP SERPL CALCULATED.3IONS-SCNC: 11.9 MMOL/L
AST SERPL-CCNC: 13 U/L (ref 1–32)
BILIRUB SERPL-MCNC: 0.3 MG/DL (ref 0.1–1.2)
BUN BLD-MCNC: 13 MG/DL (ref 6–20)
BUN/CREAT SERPL: 16.3 (ref 7–25)
CALCIUM SPEC-SCNC: 9.2 MG/DL (ref 8.6–10.5)
CHLORIDE SERPL-SCNC: 99 MMOL/L (ref 98–107)
CO2 SERPL-SCNC: 27.1 MMOL/L (ref 22–29)
CREAT BLD-MCNC: 0.8 MG/DL (ref 0.57–1)
DEPRECATED RDW RBC AUTO: 50.1 FL (ref 37–54)
ERYTHROCYTE [DISTWIDTH] IN BLOOD BY AUTOMATED COUNT: 14.8 % (ref 11.7–13)
GFR SERPL CREATININE-BSD FRML MDRD: 76 ML/MIN/1.73
GLOBULIN UR ELPH-MCNC: 3.3 GM/DL
GLUCOSE BLD-MCNC: 108 MG/DL (ref 65–99)
HCG SERPL QL: NEGATIVE
HCT VFR BLD AUTO: 44 % (ref 35.6–45.5)
HGB BLD-MCNC: 13.9 G/DL (ref 11.9–15.5)
MCH RBC QN AUTO: 29.6 PG (ref 26.9–32)
MCHC RBC AUTO-ENTMCNC: 31.6 G/DL (ref 32.4–36.3)
MCV RBC AUTO: 93.8 FL (ref 80.5–98.2)
PLATELET # BLD AUTO: 211 10*3/MM3 (ref 140–500)
PMV BLD AUTO: 10.4 FL (ref 6–12)
POTASSIUM BLD-SCNC: 4.3 MMOL/L (ref 3.5–5.2)
PROT SERPL-MCNC: 7 G/DL (ref 6–8.5)
RBC # BLD AUTO: 4.69 10*6/MM3 (ref 3.9–5.2)
SODIUM BLD-SCNC: 138 MMOL/L (ref 136–145)
WBC NRBC COR # BLD: 7.78 10*3/MM3 (ref 4.5–10.7)

## 2017-10-20 RX ORDER — CHLORHEXIDINE GLUCONATE 500 MG/1
CLOTH TOPICAL
COMMUNITY
End: 2017-10-24 | Stop reason: HOSPADM

## 2017-10-20 RX ORDER — OMEPRAZOLE 40 MG/1
40 CAPSULE, DELAYED RELEASE ORAL DAILY
COMMUNITY

## 2017-10-20 RX ORDER — LEVOTHYROXINE SODIUM 88 UG/1
88 TABLET ORAL DAILY
COMMUNITY

## 2017-10-23 ENCOUNTER — ANESTHESIA (OUTPATIENT)
Dept: PERIOP | Facility: HOSPITAL | Age: 47
End: 2017-10-23

## 2017-10-23 ENCOUNTER — HOSPITAL ENCOUNTER (INPATIENT)
Facility: HOSPITAL | Age: 47
LOS: 1 days | Discharge: HOME | End: 2017-10-24
Attending: SURGERY | Admitting: SURGERY

## 2017-10-23 ENCOUNTER — ANESTHESIA EVENT (OUTPATIENT)
Dept: PERIOP | Facility: HOSPITAL | Age: 47
End: 2017-10-23

## 2017-10-23 DIAGNOSIS — E66.01 OBESITY, CLASS III, BMI 40-49.9 (MORBID OBESITY) (HCC): ICD-10-CM

## 2017-10-23 LAB
GLUCOSE BLDC GLUCOMTR-MCNC: 115 MG/DL (ref 70–130)
GLUCOSE BLDC GLUCOMTR-MCNC: 119 MG/DL (ref 70–130)
GLUCOSE BLDC GLUCOMTR-MCNC: 137 MG/DL (ref 70–130)

## 2017-10-23 PROCEDURE — 25010000002 HYDROMORPHONE PER 4 MG: Performed by: ANESTHESIOLOGY

## 2017-10-23 PROCEDURE — 94799 UNLISTED PULMONARY SVC/PX: CPT

## 2017-10-23 PROCEDURE — 82962 GLUCOSE BLOOD TEST: CPT

## 2017-10-23 PROCEDURE — 0DB64Z3 EXCISION OF STOMACH, PERCUTANEOUS ENDOSCOPIC APPROACH, VERTICAL: ICD-10-PCS | Performed by: SURGERY

## 2017-10-23 PROCEDURE — 25010000002 ONDANSETRON PER 1 MG: Performed by: NURSE ANESTHETIST, CERTIFIED REGISTERED

## 2017-10-23 PROCEDURE — 25010000002 MIDAZOLAM PER 1 MG: Performed by: ANESTHESIOLOGY

## 2017-10-23 PROCEDURE — 25010000002 PROPOFOL 10 MG/ML EMULSION: Performed by: NURSE ANESTHETIST, CERTIFIED REGISTERED

## 2017-10-23 PROCEDURE — 25010000002 METOCLOPRAMIDE PER 10 MG: Performed by: SURGERY

## 2017-10-23 PROCEDURE — 43775 LAP SLEEVE GASTRECTOMY: CPT | Performed by: PHYSICIAN ASSISTANT

## 2017-10-23 PROCEDURE — 25010000002 PHENYLEPHRINE PER 1 ML: Performed by: NURSE ANESTHETIST, CERTIFIED REGISTERED

## 2017-10-23 PROCEDURE — 25010000002 FENTANYL CITRATE (PF) 100 MCG/2ML SOLUTION: Performed by: NURSE ANESTHETIST, CERTIFIED REGISTERED

## 2017-10-23 PROCEDURE — 25010000002 KETOROLAC TROMETHAMINE PER 15 MG: Performed by: SURGERY

## 2017-10-23 PROCEDURE — 94640 AIRWAY INHALATION TREATMENT: CPT

## 2017-10-23 PROCEDURE — 25010000002 NEOSTIGMINE PER 0.5 MG: Performed by: NURSE ANESTHETIST, CERTIFIED REGISTERED

## 2017-10-23 PROCEDURE — 25010000002 ENOXAPARIN PER 10 MG: Performed by: SURGERY

## 2017-10-23 PROCEDURE — 0BQT4ZZ REPAIR DIAPHRAGM, PERCUTANEOUS ENDOSCOPIC APPROACH: ICD-10-PCS | Performed by: SURGERY

## 2017-10-23 PROCEDURE — 25010000002 HYDROMORPHONE PER 4 MG

## 2017-10-23 PROCEDURE — 43775 LAP SLEEVE GASTRECTOMY: CPT | Performed by: SURGERY

## 2017-10-23 DEVICE — SEALANT FIBRIN TISSEEL FZ 4ML: Type: IMPLANTABLE DEVICE | Site: STOMACH | Status: FUNCTIONAL

## 2017-10-23 DEVICE — PERI-STRIPS DRY WITH VERITAS COLLAGEN MATRIX (PSD-V) IS PREPARED FROM DEHYDRATED BOVINE PERICARDIUM PROCURED FROM CATTLE UNDER 30 MONTHS OF AGE IN THE UNITED STATES. ONE (1) TUBE OF PSD GEL (GEL) IS PROVIDED FOR EVERY TWO (2) POUCHES OF PSD-V. THE GEL IS USED TO CREATE A TEMPORARY BOND BETWEEN THE PSD-V BUTTRESS AND THE SURGICAL STAPLER JAWS UNTIL THE STAPLER IS POSITIONED AND FIRED.
Type: IMPLANTABLE DEVICE | Site: STOMACH | Status: FUNCTIONAL
Brand: PERI-STRIPS DRY WITH VERITAS COLLAGEN MATRIX

## 2017-10-23 RX ORDER — SODIUM CHLORIDE, SODIUM LACTATE, POTASSIUM CHLORIDE, CALCIUM CHLORIDE 600; 310; 30; 20 MG/100ML; MG/100ML; MG/100ML; MG/100ML
9 INJECTION, SOLUTION INTRAVENOUS CONTINUOUS
Status: DISCONTINUED | OUTPATIENT
Start: 2017-10-23 | End: 2017-10-23 | Stop reason: HOSPADM

## 2017-10-23 RX ORDER — FENTANYL CITRATE 50 UG/ML
25 INJECTION, SOLUTION INTRAMUSCULAR; INTRAVENOUS
Status: DISCONTINUED | OUTPATIENT
Start: 2017-10-23 | End: 2017-10-23 | Stop reason: HOSPADM

## 2017-10-23 RX ORDER — CEFAZOLIN SODIUM IN 0.9 % NACL 3 G/100 ML
3 INTRAVENOUS SOLUTION, PIGGYBACK (ML) INTRAVENOUS EVERY 8 HOURS
Status: DISCONTINUED | OUTPATIENT
Start: 2017-10-23 | End: 2017-10-23

## 2017-10-23 RX ORDER — MIDAZOLAM HYDROCHLORIDE 1 MG/ML
1 INJECTION INTRAMUSCULAR; INTRAVENOUS
Status: DISCONTINUED | OUTPATIENT
Start: 2017-10-23 | End: 2017-10-23 | Stop reason: HOSPADM

## 2017-10-23 RX ORDER — HYDROMORPHONE HYDROCHLORIDE 1 MG/ML
0.5 INJECTION, SOLUTION INTRAMUSCULAR; INTRAVENOUS; SUBCUTANEOUS
Status: DISCONTINUED | OUTPATIENT
Start: 2017-10-23 | End: 2017-10-23 | Stop reason: HOSPADM

## 2017-10-23 RX ORDER — CLINDAMYCIN PHOSPHATE 900 MG/50ML
900 INJECTION INTRAVENOUS ONCE
Status: COMPLETED | OUTPATIENT
Start: 2017-10-23 | End: 2017-10-23

## 2017-10-23 RX ORDER — BUPIVACAINE HYDROCHLORIDE AND EPINEPHRINE 5; 5 MG/ML; UG/ML
INJECTION, SOLUTION PERINEURAL AS NEEDED
Status: DISCONTINUED | OUTPATIENT
Start: 2017-10-23 | End: 2017-10-23 | Stop reason: HOSPADM

## 2017-10-23 RX ORDER — ONDANSETRON 2 MG/ML
4 INJECTION INTRAMUSCULAR; INTRAVENOUS EVERY 6 HOURS PRN
Status: DISCONTINUED | OUTPATIENT
Start: 2017-10-23 | End: 2017-10-24 | Stop reason: HOSPADM

## 2017-10-23 RX ORDER — DIPHENHYDRAMINE HYDROCHLORIDE 50 MG/ML
6.25 INJECTION INTRAMUSCULAR; INTRAVENOUS
Status: DISCONTINUED | OUTPATIENT
Start: 2017-10-23 | End: 2017-10-23 | Stop reason: HOSPADM

## 2017-10-23 RX ORDER — ALBUTEROL SULFATE 2.5 MG/3ML
2.5 SOLUTION RESPIRATORY (INHALATION)
Status: DISCONTINUED | OUTPATIENT
Start: 2017-10-23 | End: 2017-10-24 | Stop reason: HOSPADM

## 2017-10-23 RX ORDER — PROMETHAZINE HYDROCHLORIDE 25 MG/ML
6.25 INJECTION, SOLUTION INTRAMUSCULAR; INTRAVENOUS ONCE AS NEEDED
Status: DISCONTINUED | OUTPATIENT
Start: 2017-10-23 | End: 2017-10-23 | Stop reason: HOSPADM

## 2017-10-23 RX ORDER — ONDANSETRON 4 MG/1
4 TABLET, ORALLY DISINTEGRATING ORAL EVERY 6 HOURS PRN
Status: DISCONTINUED | OUTPATIENT
Start: 2017-10-23 | End: 2017-10-24 | Stop reason: HOSPADM

## 2017-10-23 RX ORDER — CLONIDINE HYDROCHLORIDE 0.1 MG/1
0.1 TABLET ORAL EVERY 6 HOURS PRN
Status: DISCONTINUED | OUTPATIENT
Start: 2017-10-23 | End: 2017-10-24 | Stop reason: HOSPADM

## 2017-10-23 RX ORDER — SODIUM CHLORIDE, SODIUM LACTATE, POTASSIUM CHLORIDE, CALCIUM CHLORIDE 600; 310; 30; 20 MG/100ML; MG/100ML; MG/100ML; MG/100ML
150 INJECTION, SOLUTION INTRAVENOUS CONTINUOUS
Status: DISCONTINUED | OUTPATIENT
Start: 2017-10-23 | End: 2017-10-24 | Stop reason: HOSPADM

## 2017-10-23 RX ORDER — ONDANSETRON 2 MG/ML
INJECTION INTRAMUSCULAR; INTRAVENOUS AS NEEDED
Status: DISCONTINUED | OUTPATIENT
Start: 2017-10-23 | End: 2017-10-23 | Stop reason: SURG

## 2017-10-23 RX ORDER — ACETAMINOPHEN 160 MG/5ML
650 SOLUTION ORAL EVERY 4 HOURS PRN
Status: DISCONTINUED | OUTPATIENT
Start: 2017-10-23 | End: 2017-10-24 | Stop reason: HOSPADM

## 2017-10-23 RX ORDER — PROPOFOL 10 MG/ML
VIAL (ML) INTRAVENOUS AS NEEDED
Status: DISCONTINUED | OUTPATIENT
Start: 2017-10-23 | End: 2017-10-23 | Stop reason: SURG

## 2017-10-23 RX ORDER — LABETALOL HYDROCHLORIDE 5 MG/ML
5 INJECTION, SOLUTION INTRAVENOUS
Status: DISCONTINUED | OUTPATIENT
Start: 2017-10-23 | End: 2017-10-23 | Stop reason: HOSPADM

## 2017-10-23 RX ORDER — FENTANYL CITRATE 50 UG/ML
INJECTION, SOLUTION INTRAMUSCULAR; INTRAVENOUS AS NEEDED
Status: DISCONTINUED | OUTPATIENT
Start: 2017-10-23 | End: 2017-10-23 | Stop reason: SURG

## 2017-10-23 RX ORDER — MAGNESIUM HYDROXIDE 1200 MG/15ML
LIQUID ORAL AS NEEDED
Status: DISCONTINUED | OUTPATIENT
Start: 2017-10-23 | End: 2017-10-23 | Stop reason: HOSPADM

## 2017-10-23 RX ORDER — CYANOCOBALAMIN 1000 UG/ML
1000 INJECTION, SOLUTION INTRAMUSCULAR; SUBCUTANEOUS ONCE
Status: COMPLETED | OUTPATIENT
Start: 2017-10-24 | End: 2017-10-24

## 2017-10-23 RX ORDER — PROMETHAZINE HYDROCHLORIDE 25 MG/1
25 TABLET ORAL ONCE AS NEEDED
Status: DISCONTINUED | OUTPATIENT
Start: 2017-10-23 | End: 2017-10-23 | Stop reason: HOSPADM

## 2017-10-23 RX ORDER — NALOXONE HCL 0.4 MG/ML
0.4 VIAL (ML) INJECTION AS NEEDED
Status: DISCONTINUED | OUTPATIENT
Start: 2017-10-23 | End: 2017-10-23 | Stop reason: HOSPADM

## 2017-10-23 RX ORDER — PROMETHAZINE HYDROCHLORIDE 25 MG/1
25 SUPPOSITORY RECTAL ONCE AS NEEDED
Status: DISCONTINUED | OUTPATIENT
Start: 2017-10-23 | End: 2017-10-23 | Stop reason: HOSPADM

## 2017-10-23 RX ORDER — MORPHINE SULFATE 2 MG/ML
2 INJECTION, SOLUTION INTRAMUSCULAR; INTRAVENOUS
Status: DISCONTINUED | OUTPATIENT
Start: 2017-10-23 | End: 2017-10-24 | Stop reason: HOSPADM

## 2017-10-23 RX ORDER — FAMOTIDINE 10 MG/ML
20 INJECTION, SOLUTION INTRAVENOUS EVERY 12 HOURS SCHEDULED
Status: DISCONTINUED | OUTPATIENT
Start: 2017-10-23 | End: 2017-10-24 | Stop reason: HOSPADM

## 2017-10-23 RX ORDER — METOCLOPRAMIDE HYDROCHLORIDE 5 MG/ML
10 INJECTION INTRAMUSCULAR; INTRAVENOUS EVERY 6 HOURS
Status: DISCONTINUED | OUTPATIENT
Start: 2017-10-23 | End: 2017-10-24 | Stop reason: HOSPADM

## 2017-10-23 RX ORDER — HYDROMORPHONE HYDROCHLORIDE 2 MG/1
2 TABLET ORAL EVERY 4 HOURS PRN
Status: DISCONTINUED | OUTPATIENT
Start: 2017-10-23 | End: 2017-10-24 | Stop reason: HOSPADM

## 2017-10-23 RX ORDER — LORAZEPAM 2 MG/ML
1 INJECTION INTRAMUSCULAR EVERY 12 HOURS PRN
Status: DISCONTINUED | OUTPATIENT
Start: 2017-10-23 | End: 2017-10-24 | Stop reason: HOSPADM

## 2017-10-23 RX ORDER — SODIUM CHLORIDE, SODIUM LACTATE, POTASSIUM CHLORIDE, CALCIUM CHLORIDE 600; 310; 30; 20 MG/100ML; MG/100ML; MG/100ML; MG/100ML
100 INJECTION, SOLUTION INTRAVENOUS CONTINUOUS
Status: DISCONTINUED | OUTPATIENT
Start: 2017-10-23 | End: 2017-10-23 | Stop reason: HOSPADM

## 2017-10-23 RX ORDER — FAMOTIDINE 10 MG/ML
20 INJECTION, SOLUTION INTRAVENOUS ONCE
Status: COMPLETED | OUTPATIENT
Start: 2017-10-23 | End: 2017-10-23

## 2017-10-23 RX ORDER — ONDANSETRON 2 MG/ML
4 INJECTION INTRAMUSCULAR; INTRAVENOUS ONCE AS NEEDED
Status: DISCONTINUED | OUTPATIENT
Start: 2017-10-23 | End: 2017-10-23 | Stop reason: HOSPADM

## 2017-10-23 RX ORDER — ACETAMINOPHEN 160 MG/5ML
975 SOLUTION ORAL ONCE
Status: COMPLETED | OUTPATIENT
Start: 2017-10-23 | End: 2017-10-23

## 2017-10-23 RX ORDER — KETOROLAC TROMETHAMINE 30 MG/ML
30 INJECTION, SOLUTION INTRAMUSCULAR; INTRAVENOUS 4 TIMES DAILY
Status: COMPLETED | OUTPATIENT
Start: 2017-10-23 | End: 2017-10-24

## 2017-10-23 RX ORDER — SODIUM CHLORIDE 9 MG/ML
INJECTION, SOLUTION INTRAVENOUS AS NEEDED
Status: DISCONTINUED | OUTPATIENT
Start: 2017-10-23 | End: 2017-10-23 | Stop reason: HOSPADM

## 2017-10-23 RX ORDER — GLYCOPYRROLATE 0.2 MG/ML
INJECTION INTRAMUSCULAR; INTRAVENOUS AS NEEDED
Status: DISCONTINUED | OUTPATIENT
Start: 2017-10-23 | End: 2017-10-23 | Stop reason: SURG

## 2017-10-23 RX ORDER — PROMETHAZINE HYDROCHLORIDE 25 MG/ML
12.5 INJECTION, SOLUTION INTRAMUSCULAR; INTRAVENOUS ONCE AS NEEDED
Status: DISCONTINUED | OUTPATIENT
Start: 2017-10-23 | End: 2017-10-23 | Stop reason: HOSPADM

## 2017-10-23 RX ORDER — ROCURONIUM BROMIDE 10 MG/ML
INJECTION, SOLUTION INTRAVENOUS AS NEEDED
Status: DISCONTINUED | OUTPATIENT
Start: 2017-10-23 | End: 2017-10-23 | Stop reason: SURG

## 2017-10-23 RX ORDER — HYDROMORPHONE HYDROCHLORIDE 1 MG/ML
0.5 INJECTION, SOLUTION INTRAMUSCULAR; INTRAVENOUS; SUBCUTANEOUS
Status: DISCONTINUED | OUTPATIENT
Start: 2017-10-23 | End: 2017-10-24 | Stop reason: HOSPADM

## 2017-10-23 RX ORDER — MIDAZOLAM HYDROCHLORIDE 1 MG/ML
2 INJECTION INTRAMUSCULAR; INTRAVENOUS
Status: DISCONTINUED | OUTPATIENT
Start: 2017-10-23 | End: 2017-10-23 | Stop reason: HOSPADM

## 2017-10-23 RX ORDER — FLUMAZENIL 0.1 MG/ML
0.2 INJECTION INTRAVENOUS AS NEEDED
Status: DISCONTINUED | OUTPATIENT
Start: 2017-10-23 | End: 2017-10-23 | Stop reason: HOSPADM

## 2017-10-23 RX ORDER — CHLORHEXIDINE GLUCONATE 0.12 MG/ML
15 RINSE ORAL SEE ADMIN INSTRUCTIONS
Status: COMPLETED | OUTPATIENT
Start: 2017-10-23 | End: 2017-10-23

## 2017-10-23 RX ORDER — SODIUM CHLORIDE 0.9 % (FLUSH) 0.9 %
1-10 SYRINGE (ML) INJECTION AS NEEDED
Status: DISCONTINUED | OUTPATIENT
Start: 2017-10-23 | End: 2017-10-23 | Stop reason: HOSPADM

## 2017-10-23 RX ORDER — HYDROCHLOROTHIAZIDE 25 MG/1
25 TABLET ORAL DAILY
Status: DISCONTINUED | OUTPATIENT
Start: 2017-10-23 | End: 2017-10-24 | Stop reason: HOSPADM

## 2017-10-23 RX ORDER — NITROGLYCERIN 0.4 MG/1
0.4 TABLET SUBLINGUAL
Status: DISCONTINUED | OUTPATIENT
Start: 2017-10-23 | End: 2017-10-24 | Stop reason: HOSPADM

## 2017-10-23 RX ORDER — PROMETHAZINE HYDROCHLORIDE 25 MG/1
12.5 TABLET ORAL ONCE AS NEEDED
Status: DISCONTINUED | OUTPATIENT
Start: 2017-10-23 | End: 2017-10-23 | Stop reason: HOSPADM

## 2017-10-23 RX ORDER — PANTOPRAZOLE SODIUM 40 MG/10ML
40 INJECTION, POWDER, LYOPHILIZED, FOR SOLUTION INTRAVENOUS ONCE
Status: COMPLETED | OUTPATIENT
Start: 2017-10-23 | End: 2017-10-23

## 2017-10-23 RX ORDER — LABETALOL HYDROCHLORIDE 5 MG/ML
10 INJECTION, SOLUTION INTRAVENOUS
Status: DISCONTINUED | OUTPATIENT
Start: 2017-10-23 | End: 2017-10-24 | Stop reason: HOSPADM

## 2017-10-23 RX ORDER — NALOXONE HCL 0.4 MG/ML
0.4 VIAL (ML) INJECTION
Status: DISCONTINUED | OUTPATIENT
Start: 2017-10-23 | End: 2017-10-24 | Stop reason: HOSPADM

## 2017-10-23 RX ORDER — HYDROMORPHONE HYDROCHLORIDE 1 MG/ML
0.25 INJECTION, SOLUTION INTRAMUSCULAR; INTRAVENOUS; SUBCUTANEOUS
Status: DISCONTINUED | OUTPATIENT
Start: 2017-10-23 | End: 2017-10-23 | Stop reason: HOSPADM

## 2017-10-23 RX ORDER — SCOLOPAMINE TRANSDERMAL SYSTEM 1 MG/1
1 PATCH, EXTENDED RELEASE TRANSDERMAL ONCE
Status: DISCONTINUED | OUTPATIENT
Start: 2017-10-23 | End: 2017-10-23

## 2017-10-23 RX ORDER — EPHEDRINE SULFATE 50 MG/ML
5 INJECTION, SOLUTION INTRAVENOUS ONCE AS NEEDED
Status: DISCONTINUED | OUTPATIENT
Start: 2017-10-23 | End: 2017-10-23 | Stop reason: HOSPADM

## 2017-10-23 RX ORDER — EPHEDRINE SULFATE 50 MG/ML
INJECTION, SOLUTION INTRAVENOUS AS NEEDED
Status: DISCONTINUED | OUTPATIENT
Start: 2017-10-23 | End: 2017-10-23 | Stop reason: SURG

## 2017-10-23 RX ORDER — CLINDAMYCIN PHOSPHATE 900 MG/50ML
900 INJECTION INTRAVENOUS EVERY 8 HOURS
Status: COMPLETED | OUTPATIENT
Start: 2017-10-23 | End: 2017-10-24

## 2017-10-23 RX ORDER — PROMETHAZINE HYDROCHLORIDE 25 MG/ML
12.5 INJECTION, SOLUTION INTRAMUSCULAR; INTRAVENOUS EVERY 6 HOURS PRN
Status: DISCONTINUED | OUTPATIENT
Start: 2017-10-23 | End: 2017-10-24 | Stop reason: HOSPADM

## 2017-10-23 RX ORDER — ONDANSETRON 4 MG/1
4 TABLET, FILM COATED ORAL EVERY 6 HOURS PRN
Status: DISCONTINUED | OUTPATIENT
Start: 2017-10-23 | End: 2017-10-24 | Stop reason: HOSPADM

## 2017-10-23 RX ORDER — NALOXONE HCL 0.4 MG/ML
0.1 VIAL (ML) INJECTION
Status: DISCONTINUED | OUTPATIENT
Start: 2017-10-23 | End: 2017-10-24 | Stop reason: HOSPADM

## 2017-10-23 RX ORDER — HYDRALAZINE HYDROCHLORIDE 20 MG/ML
5 INJECTION INTRAMUSCULAR; INTRAVENOUS
Status: DISCONTINUED | OUTPATIENT
Start: 2017-10-23 | End: 2017-10-23 | Stop reason: HOSPADM

## 2017-10-23 RX ORDER — DIPHENHYDRAMINE HYDROCHLORIDE 50 MG/ML
25 INJECTION INTRAMUSCULAR; INTRAVENOUS EVERY 4 HOURS PRN
Status: DISCONTINUED | OUTPATIENT
Start: 2017-10-23 | End: 2017-10-24 | Stop reason: HOSPADM

## 2017-10-23 RX ORDER — LEVOTHYROXINE SODIUM 88 UG/1
88 TABLET ORAL DAILY
Status: DISCONTINUED | OUTPATIENT
Start: 2017-10-23 | End: 2017-10-24 | Stop reason: HOSPADM

## 2017-10-23 RX ORDER — LIDOCAINE HYDROCHLORIDE 20 MG/ML
INJECTION, SOLUTION INFILTRATION; PERINEURAL AS NEEDED
Status: DISCONTINUED | OUTPATIENT
Start: 2017-10-23 | End: 2017-10-23 | Stop reason: SURG

## 2017-10-23 RX ADMIN — FAMOTIDINE 20 MG: 10 INJECTION, SOLUTION INTRAVENOUS at 07:54

## 2017-10-23 RX ADMIN — HYDROMORPHONE HYDROCHLORIDE 0.5 MG: 1 INJECTION, SOLUTION INTRAMUSCULAR; INTRAVENOUS; SUBCUTANEOUS at 11:47

## 2017-10-23 RX ADMIN — SCOPALAMINE 1 PATCH: 1 PATCH, EXTENDED RELEASE TRANSDERMAL at 07:54

## 2017-10-23 RX ADMIN — SUGAMMADEX 200 MG: 100 INJECTION, SOLUTION INTRAVENOUS at 11:10

## 2017-10-23 RX ADMIN — SODIUM CHLORIDE, POTASSIUM CHLORIDE, SODIUM LACTATE AND CALCIUM CHLORIDE 9 ML/HR: 600; 310; 30; 20 INJECTION, SOLUTION INTRAVENOUS at 09:11

## 2017-10-23 RX ADMIN — PHENYLEPHRINE HYDROCHLORIDE 100 MCG: 10 INJECTION INTRAVENOUS at 10:51

## 2017-10-23 RX ADMIN — FENTANYL CITRATE 100 MCG: 50 INJECTION INTRAMUSCULAR; INTRAVENOUS at 10:26

## 2017-10-23 RX ADMIN — HYDROCODONE BITARTRATE AND ACETAMINOPHEN 15 ML: 7.5; 325 SOLUTION ORAL at 16:07

## 2017-10-23 RX ADMIN — CLINDAMYCIN PHOSPHATE 900 MG: 18 INJECTION, SOLUTION INTRAMUSCULAR; INTRAVENOUS at 17:26

## 2017-10-23 RX ADMIN — HYOSCYAMINE SULFATE 125 MCG: 0.12 TABLET ORAL at 17:27

## 2017-10-23 RX ADMIN — NEOSTIGMINE METHYLSULFATE 5 MG: 1 INJECTION INTRAMUSCULAR; INTRAVENOUS; SUBCUTANEOUS at 11:10

## 2017-10-23 RX ADMIN — PROPOFOL 150 MG: 10 INJECTION, EMULSION INTRAVENOUS at 10:26

## 2017-10-23 RX ADMIN — ACETAMINOPHEN 975 MG: 325 SOLUTION ORAL at 09:12

## 2017-10-23 RX ADMIN — EPHEDRINE SULFATE 10 MG: 50 INJECTION INTRAMUSCULAR; INTRAVENOUS; SUBCUTANEOUS at 10:47

## 2017-10-23 RX ADMIN — KETOROLAC TROMETHAMINE 30 MG: 30 INJECTION, SOLUTION INTRAMUSCULAR at 13:48

## 2017-10-23 RX ADMIN — SODIUM CHLORIDE, POTASSIUM CHLORIDE, SODIUM LACTATE AND CALCIUM CHLORIDE 150 ML/HR: 600; 310; 30; 20 INJECTION, SOLUTION INTRAVENOUS at 20:55

## 2017-10-23 RX ADMIN — CLINDAMYCIN PHOSPHATE 900 MG: 18 INJECTION, SOLUTION INTRAMUSCULAR; INTRAVENOUS at 10:13

## 2017-10-23 RX ADMIN — HYOSCYAMINE SULFATE 125 MCG: 0.12 TABLET ORAL at 20:47

## 2017-10-23 RX ADMIN — PHENYLEPHRINE HYDROCHLORIDE 100 MCG: 10 INJECTION INTRAVENOUS at 11:03

## 2017-10-23 RX ADMIN — ALBUTEROL SULFATE 2.5 MG: 2.5 SOLUTION RESPIRATORY (INHALATION) at 19:59

## 2017-10-23 RX ADMIN — HYDROCODONE BITARTRATE AND ACETAMINOPHEN 15 ML: 7.5; 325 SOLUTION ORAL at 20:47

## 2017-10-23 RX ADMIN — KETOROLAC TROMETHAMINE 30 MG: 30 INJECTION, SOLUTION INTRAMUSCULAR at 17:27

## 2017-10-23 RX ADMIN — SODIUM CHLORIDE, POTASSIUM CHLORIDE, SODIUM LACTATE AND CALCIUM CHLORIDE 500 ML: 600; 310; 30; 20 INJECTION, SOLUTION INTRAVENOUS at 07:53

## 2017-10-23 RX ADMIN — GLYCOPYRROLATE 0.8 MG: 0.2 INJECTION INTRAMUSCULAR; INTRAVENOUS at 11:10

## 2017-10-23 RX ADMIN — EPHEDRINE SULFATE 10 MG: 50 INJECTION INTRAMUSCULAR; INTRAVENOUS; SUBCUTANEOUS at 10:41

## 2017-10-23 RX ADMIN — EPHEDRINE SULFATE 10 MG: 50 INJECTION INTRAMUSCULAR; INTRAVENOUS; SUBCUTANEOUS at 10:53

## 2017-10-23 RX ADMIN — METOCLOPRAMIDE 10 MG: 5 INJECTION, SOLUTION INTRAMUSCULAR; INTRAVENOUS at 13:48

## 2017-10-23 RX ADMIN — KETOROLAC TROMETHAMINE 30 MG: 30 INJECTION, SOLUTION INTRAMUSCULAR at 20:47

## 2017-10-23 RX ADMIN — METOCLOPRAMIDE 10 MG: 5 INJECTION, SOLUTION INTRAMUSCULAR; INTRAVENOUS at 19:51

## 2017-10-23 RX ADMIN — CHLORHEXIDINE GLUCONATE 15 ML: 1.2 RINSE ORAL at 09:12

## 2017-10-23 RX ADMIN — ONDANSETRON 4 MG: 2 INJECTION INTRAMUSCULAR; INTRAVENOUS at 11:10

## 2017-10-23 RX ADMIN — SODIUM CHLORIDE, POTASSIUM CHLORIDE, SODIUM LACTATE AND CALCIUM CHLORIDE: 600; 310; 30; 20 INJECTION, SOLUTION INTRAVENOUS at 11:10

## 2017-10-23 RX ADMIN — HYDROMORPHONE HYDROCHLORIDE 0.5 MG: 1 INJECTION, SOLUTION INTRAMUSCULAR; INTRAVENOUS; SUBCUTANEOUS at 11:58

## 2017-10-23 RX ADMIN — LIDOCAINE HYDROCHLORIDE 100 MG: 20 INJECTION, SOLUTION INFILTRATION; PERINEURAL at 10:26

## 2017-10-23 RX ADMIN — HYOSCYAMINE SULFATE 125 MCG: 0.12 TABLET ORAL at 13:47

## 2017-10-23 RX ADMIN — CHLORHEXIDINE GLUCONATE 15 ML: 1.2 RINSE ORAL at 07:55

## 2017-10-23 RX ADMIN — PANTOPRAZOLE SODIUM 40 MG: 40 INJECTION, POWDER, FOR SOLUTION INTRAVENOUS at 07:54

## 2017-10-23 RX ADMIN — ENOXAPARIN SODIUM 40 MG: 40 INJECTION SUBCUTANEOUS at 10:13

## 2017-10-23 RX ADMIN — FAMOTIDINE 20 MG: 10 INJECTION, SOLUTION INTRAVENOUS at 20:47

## 2017-10-23 RX ADMIN — SODIUM CHLORIDE, POTASSIUM CHLORIDE, SODIUM LACTATE AND CALCIUM CHLORIDE 150 ML/HR: 600; 310; 30; 20 INJECTION, SOLUTION INTRAVENOUS at 13:47

## 2017-10-23 RX ADMIN — PHENYLEPHRINE HYDROCHLORIDE 100 MCG: 10 INJECTION INTRAVENOUS at 10:56

## 2017-10-23 RX ADMIN — MIDAZOLAM 1 MG: 1 INJECTION INTRAMUSCULAR; INTRAVENOUS at 07:54

## 2017-10-23 RX ADMIN — ROCURONIUM BROMIDE 40 MG: 10 INJECTION INTRAVENOUS at 10:26

## 2017-10-23 RX ADMIN — ALBUTEROL SULFATE 2.5 MG: 2.5 SOLUTION RESPIRATORY (INHALATION) at 15:10

## 2017-10-23 NOTE — ANESTHESIA PROCEDURE NOTES
Airway  Urgency: elective    Airway not difficult    General Information and Staff    Patient location during procedure: OR  Anesthesiologist: LUKE SIMPSON  CRNA: MAXIM EVANS    Indications and Patient Condition  Indications for airway management: airway protection    Preoxygenated: yes  MILS not maintained throughout  Mask difficulty assessment: 1 - vent by mask    Final Airway Details  Final airway type: endotracheal airway      Successful airway: ETT  Cuffed: yes   Successful intubation technique: direct laryngoscopy  Facilitating devices/methods: intubating stylet  Endotracheal tube insertion site: oral  Blade: Christopher  Blade size: #2  ETT size: 7.0 mm  Cormack-Lehane Classification: grade I - full view of glottis  Placement verified by: chest auscultation and capnometry   Cuff volume (mL): 8  Measured from: lips  Number of attempts at approach: 1    Additional Comments  Ett placed easily, appears atraumatic, dentition intact

## 2017-10-23 NOTE — ANESTHESIA POSTPROCEDURE EVALUATION
Patient: Bisi Perla    Procedure Summary     Date Anesthesia Start Anesthesia Stop Room / Location    10/23/17 1024 1131  [facility]  /  [facility]       Procedure Diagnosis Surgeon Provider    GASTRIC SLEEVE LAPAROSCOPIC, HIATAL HERNIA REPAIR (N/A Abdomen) Obesity, Class III, BMI 40-49.9 (morbid obesity)  (Obesity, Class III, BMI 40-49.9 (morbid obesity) [E66.01]) MD Iain Aaron Jr., MD          Anesthesia Type: general  Last vitals  BP   116/60 (10/23/17 1215)   Temp   36.4 °C (97.5 °F) (10/23/17 1215)   Pulse   88 (10/23/17 1215)   Resp   16 (10/23/17 1215)     SpO2   96 % (10/23/17 1215)     Post Anesthesia Care and Evaluation    Patient location during evaluation: bedside  Patient participation: complete - patient participated  Level of consciousness: awake and alert  Pain management: adequate  Airway patency: patent  Anesthetic complications: No anesthetic complications    Cardiovascular status: acceptable  Respiratory status: acceptable  Hydration status: acceptable    Comments: /60  Pulse 88  Temp 36.4 °C (97.5 °F)  Resp 16  LMP Comment: urine serum neg  SpO2 96%

## 2017-10-23 NOTE — ANESTHESIA PREPROCEDURE EVALUATION
" Anesthesia Evaluation     no history of anesthetic complications:         Airway   Mallampati: II  no difficulty expected  Dental    (+) lower dentures and upper dentures    Pulmonary - normal exam   (+) a smoker Former, sleep apnea on CPAP,   (-) COPD, asthma    PE comment: nonlabored  Cardiovascular - normal exam    Rhythm: regular  Rate: normal    (+) hyperlipidemia  (-) hypertension, valvular problems/murmurs, past MI, CAD, dysrhythmias, anginaPVD: record indicates \"intermittent claudication\" however pt describes leg pains as specific to knee joint itself and not claudication type pain.    ROS comment: Normal EKG, Grossly Normal Echo, & Normal Myocardial Perfusion study    Neuro/Psych  (+) weakness (L-sided), psychiatric history (chronic fatigue) Anxiety, Depression and Bipolar,    (-) seizures, TIA, CVA  GI/Hepatic/Renal/Endo    (+) morbid obesity, GERD, diabetes mellitus type 2 using insulin, hypothyroidism,   (-) liver disease, hyperthyroidism    Musculoskeletal (-) negative ROS    Abdominal    Substance History      OB/GYN          Other                                        Anesthesia Plan    ASA 3     general     intravenous induction   Anesthetic plan and risks discussed with patient.      "

## 2017-10-24 VITALS
WEIGHT: 266 LBS | BODY MASS INDEX: 40.32 KG/M2 | RESPIRATION RATE: 16 BRPM | TEMPERATURE: 97.4 F | SYSTOLIC BLOOD PRESSURE: 126 MMHG | DIASTOLIC BLOOD PRESSURE: 97 MMHG | OXYGEN SATURATION: 96 % | HEART RATE: 85 BPM | HEIGHT: 68 IN

## 2017-10-24 LAB
ALBUMIN SERPL-MCNC: 3.3 G/DL (ref 3.5–5.2)
ALBUMIN/GLOB SERPL: 1.1 G/DL
ALP SERPL-CCNC: 58 U/L (ref 39–117)
ALT SERPL W P-5'-P-CCNC: 10 U/L (ref 1–33)
ANION GAP SERPL CALCULATED.3IONS-SCNC: 9.9 MMOL/L
AST SERPL-CCNC: 21 U/L (ref 1–32)
BASOPHILS # BLD AUTO: 0.02 10*3/MM3 (ref 0–0.2)
BASOPHILS NFR BLD AUTO: 0.3 % (ref 0–1.5)
BILIRUB SERPL-MCNC: 0.4 MG/DL (ref 0.1–1.2)
BUN BLD-MCNC: 9 MG/DL (ref 6–20)
BUN/CREAT SERPL: 11.8 (ref 7–25)
CALCIUM SPEC-SCNC: 8.7 MG/DL (ref 8.6–10.5)
CHLORIDE SERPL-SCNC: 96 MMOL/L (ref 98–107)
CO2 SERPL-SCNC: 31.1 MMOL/L (ref 22–29)
CREAT BLD-MCNC: 0.76 MG/DL (ref 0.57–1)
DEPRECATED RDW RBC AUTO: 50.1 FL (ref 37–54)
EOSINOPHIL # BLD AUTO: 0.09 10*3/MM3 (ref 0–0.7)
EOSINOPHIL NFR BLD AUTO: 1.3 % (ref 0.3–6.2)
ERYTHROCYTE [DISTWIDTH] IN BLOOD BY AUTOMATED COUNT: 14.7 % (ref 11.7–13)
GFR SERPL CREATININE-BSD FRML MDRD: 81 ML/MIN/1.73
GLOBULIN UR ELPH-MCNC: 2.9 GM/DL
GLUCOSE BLD-MCNC: 107 MG/DL (ref 65–99)
GLUCOSE BLDC GLUCOMTR-MCNC: 117 MG/DL (ref 70–130)
HCT VFR BLD AUTO: 39.7 % (ref 35.6–45.5)
HGB BLD-MCNC: 12.4 G/DL (ref 11.9–15.5)
IMM GRANULOCYTES # BLD: 0.02 10*3/MM3 (ref 0–0.03)
IMM GRANULOCYTES NFR BLD: 0.3 % (ref 0–0.5)
LYMPHOCYTES # BLD AUTO: 2.11 10*3/MM3 (ref 0.9–4.8)
LYMPHOCYTES NFR BLD AUTO: 30.1 % (ref 19.6–45.3)
MAGNESIUM SERPL-MCNC: 2 MG/DL (ref 1.6–2.6)
MCH RBC QN AUTO: 29.5 PG (ref 26.9–32)
MCHC RBC AUTO-ENTMCNC: 31.2 G/DL (ref 32.4–36.3)
MCV RBC AUTO: 94.3 FL (ref 80.5–98.2)
MONOCYTES # BLD AUTO: 0.9 10*3/MM3 (ref 0.2–1.2)
MONOCYTES NFR BLD AUTO: 12.8 % (ref 5–12)
NEUTROPHILS # BLD AUTO: 3.87 10*3/MM3 (ref 1.9–8.1)
NEUTROPHILS NFR BLD AUTO: 55.2 % (ref 42.7–76)
PHOSPHATE SERPL-MCNC: 4.1 MG/DL (ref 2.5–4.5)
PLATELET # BLD AUTO: 144 10*3/MM3 (ref 140–500)
PMV BLD AUTO: 10.7 FL (ref 6–12)
POTASSIUM BLD-SCNC: 3.4 MMOL/L (ref 3.5–5.2)
PROT SERPL-MCNC: 6.2 G/DL (ref 6–8.5)
RBC # BLD AUTO: 4.21 10*6/MM3 (ref 3.9–5.2)
SODIUM BLD-SCNC: 137 MMOL/L (ref 136–145)
WBC NRBC COR # BLD: 7.01 10*3/MM3 (ref 4.5–10.7)

## 2017-10-24 PROCEDURE — G0008 ADMIN INFLUENZA VIRUS VAC: HCPCS | Performed by: SURGERY

## 2017-10-24 PROCEDURE — 90661 CCIIV3 VAC ABX FR 0.5 ML IM: CPT | Performed by: SURGERY

## 2017-10-24 PROCEDURE — 85025 COMPLETE CBC W/AUTO DIFF WBC: CPT | Performed by: SURGERY

## 2017-10-24 PROCEDURE — 25010000002 PYRIDOXINE PER 100 MG: Performed by: SURGERY

## 2017-10-24 PROCEDURE — 25010000002 ENOXAPARIN PER 10 MG: Performed by: SURGERY

## 2017-10-24 PROCEDURE — 25010000002 THIAMINE PER 100 MG: Performed by: SURGERY

## 2017-10-24 PROCEDURE — 80053 COMPREHEN METABOLIC PANEL: CPT | Performed by: SURGERY

## 2017-10-24 PROCEDURE — 94799 UNLISTED PULMONARY SVC/PX: CPT

## 2017-10-24 PROCEDURE — 25010000002 INFLUENZA VAC SUBUNIT QUAD 0.5 ML SUSPENSION PREFILLED SYRINGE: Performed by: SURGERY

## 2017-10-24 PROCEDURE — 25010000002 KETOROLAC TROMETHAMINE PER 15 MG: Performed by: SURGERY

## 2017-10-24 PROCEDURE — 82962 GLUCOSE BLOOD TEST: CPT

## 2017-10-24 PROCEDURE — 25010000002 METOCLOPRAMIDE PER 10 MG: Performed by: SURGERY

## 2017-10-24 PROCEDURE — 84100 ASSAY OF PHOSPHORUS: CPT | Performed by: SURGERY

## 2017-10-24 PROCEDURE — 83735 ASSAY OF MAGNESIUM: CPT | Performed by: SURGERY

## 2017-10-24 PROCEDURE — 25010000002 CYANOCOBALAMIN PER 1000 MCG: Performed by: SURGERY

## 2017-10-24 RX ORDER — ONDANSETRON 4 MG/1
4 TABLET, FILM COATED ORAL EVERY 8 HOURS PRN
Qty: 25 TABLET | Refills: 0 | Status: SHIPPED | OUTPATIENT
Start: 2017-10-24

## 2017-10-24 RX ADMIN — ENOXAPARIN SODIUM 40 MG: 40 INJECTION SUBCUTANEOUS at 08:10

## 2017-10-24 RX ADMIN — KETOROLAC TROMETHAMINE 30 MG: 30 INJECTION, SOLUTION INTRAMUSCULAR at 08:07

## 2017-10-24 RX ADMIN — HYOSCYAMINE SULFATE 125 MCG: 0.12 TABLET ORAL at 08:06

## 2017-10-24 RX ADMIN — METOCLOPRAMIDE 10 MG: 5 INJECTION, SOLUTION INTRAMUSCULAR; INTRAVENOUS at 06:50

## 2017-10-24 RX ADMIN — A/SINGAPORE/GP1908/2015 IVR-180 (H1N1) (AN A/MICHIGAN/45/2015-LIKE VIRUS), A/SINGAPORE/GP2050/2015 (H3N2) (AN A/HONG KONG/4801/2014 - LIKE VIRUS), B/UTAH/9/2014 (A B/PHUKET/3073/2013-LIKE VIRUS), B/HONG KONG/259/2010 (A B/BRISBANE/60/08-LIKE VIRUS) 0.5 ML: 15; 15; 15; 15 INJECTION, SUSPENSION INTRAMUSCULAR at 11:00

## 2017-10-24 RX ADMIN — CLINDAMYCIN PHOSPHATE 900 MG: 18 INJECTION, SOLUTION INTRAMUSCULAR; INTRAVENOUS at 02:16

## 2017-10-24 RX ADMIN — LEVOTHYROXINE SODIUM 88 MCG: 88 TABLET ORAL at 08:07

## 2017-10-24 RX ADMIN — FAMOTIDINE 20 MG: 10 INJECTION, SOLUTION INTRAVENOUS at 08:07

## 2017-10-24 RX ADMIN — CYANOCOBALAMIN 1000 MCG: 1000 INJECTION, SOLUTION INTRAMUSCULAR at 08:07

## 2017-10-24 RX ADMIN — HYDROCODONE BITARTRATE AND ACETAMINOPHEN 15 ML: 7.5; 325 SOLUTION ORAL at 05:09

## 2017-10-24 RX ADMIN — METOCLOPRAMIDE 10 MG: 5 INJECTION, SOLUTION INTRAMUSCULAR; INTRAVENOUS at 02:16

## 2017-10-24 RX ADMIN — ALBUTEROL SULFATE 2.5 MG: 2.5 SOLUTION RESPIRATORY (INHALATION) at 08:38

## 2017-10-24 RX ADMIN — HYDROCHLOROTHIAZIDE 25 MG: 25 TABLET ORAL at 08:06

## 2017-10-24 RX ADMIN — FOLIC ACID 250 ML/HR: 5 INJECTION, SOLUTION INTRAMUSCULAR; INTRAVENOUS; SUBCUTANEOUS at 00:12

## 2017-10-26 ENCOUNTER — OFFICE VISIT (OUTPATIENT)
Dept: BARIATRICS/WEIGHT MGMT | Facility: CLINIC | Age: 47
End: 2017-10-26

## 2017-10-26 VITALS
DIASTOLIC BLOOD PRESSURE: 89 MMHG | HEIGHT: 68 IN | SYSTOLIC BLOOD PRESSURE: 145 MMHG | RESPIRATION RATE: 16 BRPM | BODY MASS INDEX: 40.16 KG/M2 | WEIGHT: 265 LBS | HEART RATE: 97 BPM | TEMPERATURE: 97 F

## 2017-10-26 DIAGNOSIS — R53.82 CHRONIC FATIGUE: ICD-10-CM

## 2017-10-26 DIAGNOSIS — E11.8 TYPE 2 DIABETES MELLITUS WITH COMPLICATION, WITH LONG-TERM CURRENT USE OF INSULIN (HCC): ICD-10-CM

## 2017-10-26 DIAGNOSIS — Z79.4 TYPE 2 DIABETES MELLITUS WITH COMPLICATION, WITH LONG-TERM CURRENT USE OF INSULIN (HCC): ICD-10-CM

## 2017-10-26 DIAGNOSIS — R12 HEARTBURN: ICD-10-CM

## 2017-10-26 DIAGNOSIS — E66.01 OBESITY, CLASS III, BMI 40-49.9 (MORBID OBESITY) (HCC): Primary | ICD-10-CM

## 2017-10-26 DIAGNOSIS — E78.5 HYPERLIPIDEMIA, UNSPECIFIED HYPERLIPIDEMIA TYPE: ICD-10-CM

## 2017-10-26 PROCEDURE — 99024 POSTOP FOLLOW-UP VISIT: CPT | Performed by: NURSE PRACTITIONER

## 2017-10-26 NOTE — PROGRESS NOTES
[facility]  [address]  [facility] [address] [phone]  [address]  [facility] [address] [phone]  Dept: [phone]  10/26/2017      Bisi Perla.  [id number]  [id number]  1970  female      Chief Complaint   Patient presents with   • Follow-up     1 week f/u [facility] Post-Op Bariatric Surgery:   Bisi Perla is status post laparopscopic Laparoscopic Sleeve/HH procedure, performed on 10/23/17.     HPI:   Today's weight is 265 lb (120 kg) pounds, today's BMI is Body mass index is 40.29 kg/(m^2)., she has a  loss of 10 pounds since the last visit and her weight loss since surgery is 10 pounds. The patient reports a decreased portion size and loss of appetite.  Bisi Perla denies nausea, vomiting, dysphagia, or heartburn. The patient c/o post-op pain that is improving. she is doing well with protein and water intake so far. Taking their vitamins, walking and using IS. Denies fevers, chills, chest pain or shortness of air.      Diet and Exercise: Diet history reviewed and discussed with the patient. Weight loss/gains to date discussed with the patient. No carbonated beverage consumption and exercising regularly- walking frequently.   Supplements: multivitamins, B-12, calcium, iron, B-1 and Vitamin D.     Review of Systems   Constitutional: Positive for appetite change and fatigue. Negative for unexpected weight change.   HENT: Negative.    Eyes: Negative.    Respiratory: Negative.    Cardiovascular: Negative.  Negative for leg swelling.   Gastrointestinal: Positive for abdominal pain, diarrhea and nausea. Negative for abdominal distention, constipation and vomiting.   Genitourinary: Negative for difficulty urinating, frequency and urgency.   Musculoskeletal: Negative for back pain.   Skin: Negative.    Psychiatric/Behavioral: Negative.    All other systems reviewed and are negative.      Patient Active  Problem List   Diagnosis   • Left-sided weakness   • Abnormal cardiac function test   • Hyperlipidemia   • Hypothyroidism   • Heartburn   • Obesity, Class III, BMI 40-49.9 (morbid obesity)   • Type 2 diabetes mellitus   • Chronic fatigue   • Intermittent claudication   • Snoring   • Multiple joint pain   • Depression   • Bipolar 1 disorder   • Anemia   • Vitamin D deficiency       The following portions of the patient's history were reviewed and updated as appropriate: allergies, current medications, past family history, past medical history, past social history, past surgical history and problem list.    Vitals:    10/26/17 0954   BP: 145/89   Pulse: 97   Resp: 16   Temp: 97 °F (36.1 °C)       Physical Exam   Cardiovascular: Normal rate, regular rhythm, normal heart sounds and intact distal pulses.    Pulmonary/Chest: Effort normal and breath sounds normal. No respiratory distress. She has no wheezes.   Abdominal: Soft. Bowel sounds are normal. She exhibits no distension. There is no tenderness.   Skin: Skin is warm, dry and intact. No rash noted. There is erythema.   Incisions closed without drainage and appear to be healing well.        Assessment:   Post-op, the patient is doing well.     Encounter Diagnoses   Name Primary?   • Obesity, Class III, BMI 40-49.9 (morbid obesity) Yes   • Hyperlipidemia, unspecified hyperlipidemia type    • Heartburn    • Type 2 diabetes mellitus with complication, with long-term current use of insulin    • Chronic fatigue        Plan:   Reviewed with patient the importance of following the manual for diet progression. Increase activity as tolerated. Continue increasing daily intake of protein and water.   Return to work: the patient is to return to 3 weeks from their surgery date with no restrictions unless they develop medical problems in which we will see them back in the office. They received a note in our office today with their return to work date.  Activity restrictions: no  lifting, pushing or pulling over 25lbs for 3 weeks.   Recommended patient be sure to get at least 70 grams of protein per day. Discussed with the patient the recommended amount of water per day to intake. Reviewed vitamin requirements. Be sure to do routine exercise and increase activity as tolerated. No asa, nsaids or steroids for 8 weeks. Patient may use miralax as needed if necessary.     Instructions / Recommendations: dietary counseling recommended, recommended a daily protein intake of  grams, vitamin supplement(s) recommended, recommended exercising at least 150 minutes per week, behavior modifications recommended and instructed to call the office for concerns, questions, or problems.     The patient was instructed to follow up at one month follow up appt.     The patient was counseled regarding post op bariatric manual

## 2017-10-31 RX ORDER — FOLIC ACID 1 MG/1
TABLET ORAL
Qty: 30 TABLET | Refills: 0 | OUTPATIENT
Start: 2017-10-31

## 2017-11-28 ENCOUNTER — OFFICE VISIT (OUTPATIENT)
Dept: BARIATRICS/WEIGHT MGMT | Facility: CLINIC | Age: 47
End: 2017-11-28

## 2017-11-28 ENCOUNTER — LAB (OUTPATIENT)
Dept: LAB | Facility: HOSPITAL | Age: 47
End: 2017-11-28

## 2017-11-28 VITALS
WEIGHT: 258 LBS | TEMPERATURE: 98.6 F | RESPIRATION RATE: 16 BRPM | DIASTOLIC BLOOD PRESSURE: 91 MMHG | SYSTOLIC BLOOD PRESSURE: 148 MMHG | HEART RATE: 79 BPM | BODY MASS INDEX: 39.1 KG/M2 | HEIGHT: 68 IN

## 2017-11-28 DIAGNOSIS — Z79.4 TYPE 2 DIABETES MELLITUS WITH COMPLICATION, WITH LONG-TERM CURRENT USE OF INSULIN (HCC): ICD-10-CM

## 2017-11-28 DIAGNOSIS — F33.9 RECURRENT MAJOR DEPRESSIVE DISORDER, REMISSION STATUS UNSPECIFIED (HCC): ICD-10-CM

## 2017-11-28 DIAGNOSIS — R53.82 CHRONIC FATIGUE: ICD-10-CM

## 2017-11-28 DIAGNOSIS — M25.50 MULTIPLE JOINT PAIN: ICD-10-CM

## 2017-11-28 DIAGNOSIS — R12 HEARTBURN: ICD-10-CM

## 2017-11-28 DIAGNOSIS — E66.9 OBESITY, CLASS II, BMI 35-39.9: Primary | ICD-10-CM

## 2017-11-28 DIAGNOSIS — E11.8 TYPE 2 DIABETES MELLITUS WITH COMPLICATION, WITH LONG-TERM CURRENT USE OF INSULIN (HCC): ICD-10-CM

## 2017-11-28 DIAGNOSIS — E66.9 OBESITY, CLASS II, BMI 35-39.9: ICD-10-CM

## 2017-11-28 LAB
25(OH)D3 SERPL-MCNC: 35.8 NG/ML (ref 30–100)
ALBUMIN SERPL-MCNC: 3.9 G/DL (ref 3.5–5.2)
ALBUMIN/GLOB SERPL: 1.1 G/DL
ALP SERPL-CCNC: 69 U/L (ref 39–117)
ALT SERPL W P-5'-P-CCNC: 8 U/L (ref 1–33)
ANION GAP SERPL CALCULATED.3IONS-SCNC: 11.8 MMOL/L
AST SERPL-CCNC: 17 U/L (ref 1–32)
BASOPHILS # BLD AUTO: 0.03 10*3/MM3 (ref 0–0.2)
BASOPHILS NFR BLD AUTO: 0.5 % (ref 0–1.5)
BILIRUB SERPL-MCNC: 0.3 MG/DL (ref 0.1–1.2)
BUN BLD-MCNC: 11 MG/DL (ref 6–20)
BUN/CREAT SERPL: 14.1 (ref 7–25)
CALCIUM SPEC-SCNC: 9.3 MG/DL (ref 8.6–10.5)
CHLORIDE SERPL-SCNC: 101 MMOL/L (ref 98–107)
CO2 SERPL-SCNC: 28.2 MMOL/L (ref 22–29)
CREAT BLD-MCNC: 0.78 MG/DL (ref 0.57–1)
DEPRECATED RDW RBC AUTO: 52.4 FL (ref 37–54)
EOSINOPHIL # BLD AUTO: 0.07 10*3/MM3 (ref 0–0.7)
EOSINOPHIL NFR BLD AUTO: 1.1 % (ref 0.3–6.2)
ERYTHROCYTE [DISTWIDTH] IN BLOOD BY AUTOMATED COUNT: 15.6 % (ref 11.7–13)
FERRITIN SERPL-MCNC: 102 NG/ML (ref 13–150)
FOLATE SERPL-MCNC: 11.36 NG/ML (ref 4.78–24.2)
GFR SERPL CREATININE-BSD FRML MDRD: 78 ML/MIN/1.73
GLOBULIN UR ELPH-MCNC: 3.4 GM/DL
GLUCOSE BLD-MCNC: 78 MG/DL (ref 65–99)
HCT VFR BLD AUTO: 42 % (ref 35.6–45.5)
HGB BLD-MCNC: 13.6 G/DL (ref 11.9–15.5)
IMM GRANULOCYTES # BLD: 0 10*3/MM3 (ref 0–0.03)
IMM GRANULOCYTES NFR BLD: 0 % (ref 0–0.5)
IRON 24H UR-MRATE: 43 MCG/DL (ref 37–145)
LYMPHOCYTES # BLD AUTO: 2.61 10*3/MM3 (ref 0.9–4.8)
LYMPHOCYTES NFR BLD AUTO: 40.5 % (ref 19.6–45.3)
MAGNESIUM SERPL-MCNC: 2.1 MG/DL (ref 1.6–2.6)
MCH RBC QN AUTO: 29.8 PG (ref 26.9–32)
MCHC RBC AUTO-ENTMCNC: 32.4 G/DL (ref 32.4–36.3)
MCV RBC AUTO: 92.1 FL (ref 80.5–98.2)
MONOCYTES # BLD AUTO: 0.42 10*3/MM3 (ref 0.2–1.2)
MONOCYTES NFR BLD AUTO: 6.5 % (ref 5–12)
NEUTROPHILS # BLD AUTO: 3.32 10*3/MM3 (ref 1.9–8.1)
NEUTROPHILS NFR BLD AUTO: 51.4 % (ref 42.7–76)
PHOSPHATE SERPL-MCNC: 2.9 MG/DL (ref 2.5–4.5)
PLATELET # BLD AUTO: 167 10*3/MM3 (ref 140–500)
PMV BLD AUTO: 10.8 FL (ref 6–12)
POTASSIUM BLD-SCNC: 4.3 MMOL/L (ref 3.5–5.2)
PREALB SERPL-MCNC: 21.7 MG/DL (ref 20–40)
PROT SERPL-MCNC: 7.3 G/DL (ref 6–8.5)
RBC # BLD AUTO: 4.56 10*6/MM3 (ref 3.9–5.2)
SODIUM BLD-SCNC: 141 MMOL/L (ref 136–145)
WBC NRBC COR # BLD: 6.45 10*3/MM3 (ref 4.5–10.7)

## 2017-11-28 PROCEDURE — 36415 COLL VENOUS BLD VENIPUNCTURE: CPT

## 2017-11-28 PROCEDURE — 99024 POSTOP FOLLOW-UP VISIT: CPT | Performed by: NURSE PRACTITIONER

## 2017-11-28 PROCEDURE — 84630 ASSAY OF ZINC: CPT

## 2017-11-28 PROCEDURE — 82728 ASSAY OF FERRITIN: CPT

## 2017-11-28 PROCEDURE — 85025 COMPLETE CBC W/AUTO DIFF WBC: CPT

## 2017-11-28 PROCEDURE — 84446 ASSAY OF VITAMIN E: CPT

## 2017-11-28 PROCEDURE — 83921 ORGANIC ACID SINGLE QUANT: CPT

## 2017-11-28 PROCEDURE — 83735 ASSAY OF MAGNESIUM: CPT

## 2017-11-28 PROCEDURE — 84134 ASSAY OF PREALBUMIN: CPT

## 2017-11-28 PROCEDURE — 80053 COMPREHEN METABOLIC PANEL: CPT

## 2017-11-28 PROCEDURE — 84425 ASSAY OF VITAMIN B-1: CPT

## 2017-11-28 PROCEDURE — 84597 ASSAY OF VITAMIN K: CPT

## 2017-11-28 PROCEDURE — 82306 VITAMIN D 25 HYDROXY: CPT

## 2017-11-28 PROCEDURE — 84100 ASSAY OF PHOSPHORUS: CPT

## 2017-11-28 PROCEDURE — 82746 ASSAY OF FOLIC ACID SERUM: CPT

## 2017-11-28 PROCEDURE — 84590 ASSAY OF VITAMIN A: CPT

## 2017-11-28 PROCEDURE — 83540 ASSAY OF IRON: CPT

## 2017-11-28 NOTE — PROGRESS NOTES
[facility]ATR[facility] BARIATRIC SURGERY  [address]  [facility] [address] [phone]  [address]  [facility] [address] [phone]  Dept: [phone]  11/28/2017      Bisi Perla.  [id number]  [id number]  1970  female      Chief Complaint   Patient presents with   • Follow-up     1 month postop GS        Post-Op Bariatric Surgery:   Bisi Perla is status post Laparoscopic Sleeve/HH procedure, performed on 10/23/17     HPI:   Today's weight is 258 lb (117 kg) pounds, today's BMI is Body mass index is 39.23 kg/(m^2)., she has a  loss of 7 pounds since the last visit and her weight loss since surgery is 17 pounds. The patient reports a decreased portion size and loss of appetite.      Bisi Perla denies nausea, vomiting, dysphagia, abdominal pain or heartburn.     Diet and Exercise: Diet history reviewed and discussed with the patient. Weight loss/gains to date discussed with the patient. The patient states they are eating 40-50 grams of protein per day. She reports eating 3 meals per day, a typical portion size of 1/2 cup, eating 2 snacks per day, drinking 5 or more 8-oz. glasses of water per day, no carbonated beverage consumption and exercising regularly- elliptical-glider twice per week.    Supplements: BA MTV with iron and calcium.     Review of Systems   Constitutional: Positive for appetite change. Negative for fatigue and unexpected weight change.   HENT: Negative.    Eyes: Negative.    Respiratory: Negative.    Cardiovascular: Negative.  Negative for leg swelling.   Gastrointestinal: Negative for abdominal distention, abdominal pain, constipation, diarrhea, nausea and vomiting.   Genitourinary: Negative for difficulty urinating, frequency and urgency.   Musculoskeletal: Negative for back pain.   Skin: Negative.    Psychiatric/Behavioral: Negative.    All other systems reviewed and are negative.      Patient Active Problem List    Diagnosis   • Left-sided weakness   • Abnormal cardiac function test   • Hyperlipidemia   • Hypothyroidism   • Heartburn   • Obesity, Class II, BMI 35-39.9   • Type 2 diabetes mellitus   • Chronic fatigue   • Intermittent claudication   • Snoring   • Multiple joint pain   • Depression   • Bipolar 1 disorder   • Anemia   • Vitamin D deficiency       Past Medical History:   Diagnosis Date   • Anemia    • Anxiety    • Bipolar 1 disorder    • Diabetes mellitus    • GERD (gastroesophageal reflux disease)    • History of transfusion    • Hyperlipidemia    • Hypothyroidism 6/21/2016   • Neuropathy    • Obesity    • Sleep apnea     ON CPAP       The following portions of the patient's history were reviewed and updated as appropriate: allergies, current medications, past family history, past medical history, past social history, past surgical history and problem list.    Vitals:    11/28/17 1342   BP: 148/91   Pulse: 79   Resp: 16   Temp: 98.6 °F (37 °C)       Physical Exam   Constitutional: She appears well-developed and well-nourished.   Neck: No thyromegaly present.   Cardiovascular: Normal rate, regular rhythm and normal heart sounds.    Pulmonary/Chest: Effort normal and breath sounds normal. No respiratory distress. She has no wheezes.   Abdominal: Soft. Bowel sounds are normal. She exhibits no distension. There is no tenderness. There is no guarding. No hernia.   Musculoskeletal: She exhibits no edema or tenderness.   Neurological: She is alert.   Skin: Skin is warm and dry. No rash noted. No erythema.   Psychiatric: She has a normal mood and affect. Her behavior is normal.   Nursing note and vitals reviewed.        Assessment:   Post-op, the patient is doing well.     Encounter Diagnoses   Name Primary?   • Obesity, Class II, BMI 35-39.9 Yes   • Heartburn    • Type 2 diabetes mellitus with complication, with long-term current use of insulin    • Multiple joint pain    • Chronic fatigue    • Recurrent major  depressive disorder, remission status unspecified        Plan:   Cautioned to avoid bread, crackers, and excess carbs. Encouraged to vary her exercise routine and keep tracking her protein to ensure that she is getting enough daily. Encouraged to work on transitioning away from supplements to primarily solid whole protein foods.   Encouraged patient to be sure to get plenty of lean protein per day through small frequent meals all with a protein source.   Activity restrictions: none.   Recommended patient be sure to get at least 70 grams of protein per day by eating small, frequent meals all with high lean protein choices. Be sure to limit/cut back on daily carbohydrate intake. Discussed with the patient the recommended amount of water per day to intake- half of body weight in ounces. Reviewed vitamin requirements. Be sure to do routine exercise, 150 minutes per week minimum, including both cardio and strength training.     Instructions / Recommendations: dietary counseling recommended, recommended a daily protein intake of  grams, vitamin supplement(s) recommended, recommended exercising at least 150 minutes per week, behavior modifications recommended and instructed to call the office for concerns, questions, or problems.     The patient was instructed to follow up in 2 months .     The patient was counseled regarding dietary intake, exercise, lab work, fluid intake. Total time spent face to face was 20 minutes and 15 minutes was spent counseling.

## 2017-12-01 LAB — ZINC SERPL-MCNC: 70 UG/DL (ref 56–134)

## 2017-12-02 LAB — METHYLMALONATE SERPL-SCNC: 100 NMOL/L (ref 0–378)

## 2017-12-03 LAB
VIT A SERPL-MCNC: 53 UG/DL (ref 20–65)
VIT B1 BLD-SCNC: 132.5 NMOL/L (ref 66.5–200)

## 2017-12-04 LAB — A-TOCOPHEROL VIT E SERPL-MCNC: 10.9 MG/L (ref 5.3–16.8)

## 2017-12-05 LAB — PHYTONADIONE SERPL-MCNC: 0.13 NG/ML (ref 0.13–1.88)

## 2018-01-18 ENCOUNTER — OFFICE VISIT (OUTPATIENT)
Dept: BARIATRICS/WEIGHT MGMT | Facility: CLINIC | Age: 48
End: 2018-01-18

## 2018-01-18 VITALS
RESPIRATION RATE: 16 BRPM | SYSTOLIC BLOOD PRESSURE: 136 MMHG | BODY MASS INDEX: 37.44 KG/M2 | DIASTOLIC BLOOD PRESSURE: 84 MMHG | HEART RATE: 79 BPM | WEIGHT: 247 LBS | TEMPERATURE: 97.8 F | HEIGHT: 68 IN

## 2018-01-18 DIAGNOSIS — R53.82 CHRONIC FATIGUE: ICD-10-CM

## 2018-01-18 DIAGNOSIS — Z79.4 TYPE 2 DIABETES MELLITUS WITH COMPLICATION, WITH LONG-TERM CURRENT USE OF INSULIN (HCC): ICD-10-CM

## 2018-01-18 DIAGNOSIS — M25.50 MULTIPLE JOINT PAIN: ICD-10-CM

## 2018-01-18 DIAGNOSIS — R12 HEARTBURN: ICD-10-CM

## 2018-01-18 DIAGNOSIS — E78.5 HYPERLIPIDEMIA, UNSPECIFIED HYPERLIPIDEMIA TYPE: ICD-10-CM

## 2018-01-18 DIAGNOSIS — E66.9 OBESITY, CLASS II, BMI 35-39.9: Primary | ICD-10-CM

## 2018-01-18 DIAGNOSIS — E11.8 TYPE 2 DIABETES MELLITUS WITH COMPLICATION, WITH LONG-TERM CURRENT USE OF INSULIN (HCC): ICD-10-CM

## 2018-01-18 PROCEDURE — 99024 POSTOP FOLLOW-UP VISIT: CPT | Performed by: NURSE PRACTITIONER

## 2018-01-18 NOTE — PROGRESS NOTES
[facility] BARIATR[facility] BARIATRIC SURGERY  [address]  [facility] [id number] [phone]  [address]  [facility] [id number] [phone]  Dept: [phone]  1/18/2018      Bisi Perla.  [id number]  [id number]  1970  female      Chief Complaint   Patient presents with   • Follow-up     3 month followup [facility] Post-Op Bariatric Surgery:   Bisi Perla is status post Laparoscopic Sleeve/HH procedure, performed on 10/23/17     HPI:   Today's weight is 112 kg (247 lb) pounds, today's BMI is Body mass index is 37.57 kg/(m^2)., she has a  loss of 11 pounds since the last visit and her weight loss since surgery is 28 pounds. The patient reports a decreased portion size and loss of appetite.      Bisi Perla denies nausea, vomiting, dysphagia, abdominal pain or heartburn.     Diet and Exercise: Diet history reviewed and discussed with the patient. Weight loss/gains to date discussed with the patient. The patient states they are eating 50 grams of protein per day. She reports eating 3 meals per day, a typical portion size of 1 cup, eating 4 snacks per day, drinking 4 or more 8-oz. glasses of water per day, no carbonated beverage consumption. Denies regular exercise and is eating sweets. Patient reports that she is limited in what exercises she can do due to her need for a right sided total knee replacement. Reports that her grandmother just passed away and that she has services coming up this week.    Supplements: BA MTV with iron and calcium.     Review of Systems   Constitutional: Positive for appetite change. Negative for fatigue and unexpected weight change.   HENT: Negative.    Eyes: Negative.    Respiratory: Negative.    Cardiovascular: Negative.  Negative for leg swelling.   Gastrointestinal: Negative for abdominal distention, abdominal pain, constipation, diarrhea, nausea and vomiting.   Genitourinary: Negative for difficulty urinating,  frequency and urgency.   Musculoskeletal: Negative for back pain.   Skin: Negative.    Psychiatric/Behavioral: Negative.    All other systems reviewed and are negative.      Patient Active Problem List   Diagnosis   • Left-sided weakness   • Abnormal cardiac function test   • Hyperlipidemia   • Hypothyroidism   • Heartburn   • Obesity, Class II, BMI 35-39.9   • Type 2 diabetes mellitus   • Chronic fatigue   • Intermittent claudication   • Snoring   • Multiple joint pain   • Depression   • Bipolar 1 disorder   • Anemia   • Vitamin D deficiency       Past Medical History:   Diagnosis Date   • Anemia    • Anxiety    • Bipolar 1 disorder    • Diabetes mellitus    • GERD (gastroesophageal reflux disease)    • History of transfusion    • Hyperlipidemia    • Hypothyroidism 6/21/2016   • Neuropathy    • Obesity    • Sleep apnea     ON CPAP       The following portions of the patient's history were reviewed and updated as appropriate: allergies, current medications, past family history, past medical history, past social history, past surgical history and problem list.    Vitals:    01/18/18 1339   BP: 136/84   Pulse: 79   Resp: 16   Temp: 97.8 °F (36.6 °C)       Physical Exam   Constitutional: She appears well-developed and well-nourished.   Neck: No thyromegaly present.   Cardiovascular: Normal rate, regular rhythm and normal heart sounds.    Pulmonary/Chest: Effort normal and breath sounds normal. No respiratory distress. She has no wheezes.   Abdominal: Soft. Bowel sounds are normal. She exhibits no distension. There is no tenderness. There is no guarding. No hernia.   Musculoskeletal: She exhibits no edema or tenderness.   Neurological: She is alert.   Skin: Skin is warm and dry. No rash noted. No erythema.   Psychiatric: She has a normal mood and affect. Her behavior is normal.   Nursing note and vitals reviewed.        Assessment:   Post-op, the patient is doing well.     Encounter Diagnoses   Name Primary?   •  Obesity, Class II, BMI 35-39.9 Yes   • Hyperlipidemia, unspecified hyperlipidemia type    • Heartburn    • Type 2 diabetes mellitus with complication, with long-term current use of insulin    • Multiple joint pain    • Chronic fatigue        Plan:   Discussed strategies to limit excess carbs as well as upper body exercises that can be done at home as well as lower body exercises that will not strain her knee. Suggested the benefits of knee surgery including the follow up physical therapy and increased mobility which will contribute to her ability to exercise. Encouraged to increase her protein to 70g per day, encouraged to keep up the good work related to fluid intake and vitamin supplementation.     Encouraged patient to be sure to get plenty of lean protein per day through small frequent meals all with a protein source.   Activity restrictions: none.   Recommended patient be sure to get at least 70 grams of protein per day by eating small, frequent meals all with high lean protein choices. Be sure to limit/cut back on daily carbohydrate intake. Discussed with the patient the recommended amount of water per day to intake- half of body weight in ounces. Reviewed vitamin requirements. Be sure to do routine exercise, 150 minutes per week minimum, including both cardio and strength training.     Instructions / Recommendations: dietary counseling recommended, recommended a daily protein intake of  grams, vitamin supplement(s) recommended, recommended exercising at least 150 minutes per week, behavior modifications recommended and instructed to call the office for concerns, questions, or problems.     The patient was instructed to follow up in 3 months .     The patient was counseled regarding exercise, protein intake, follow up. Total time spent face to face was 20 minutes and 15 minutes was spent counseling.

## 2018-05-01 ENCOUNTER — OFFICE VISIT (OUTPATIENT)
Dept: BARIATRICS/WEIGHT MGMT | Facility: CLINIC | Age: 48
End: 2018-05-01

## 2018-05-01 ENCOUNTER — LAB (OUTPATIENT)
Dept: LAB | Facility: HOSPITAL | Age: 48
End: 2018-05-01

## 2018-05-01 VITALS
TEMPERATURE: 98.4 F | HEART RATE: 92 BPM | RESPIRATION RATE: 16 BRPM | DIASTOLIC BLOOD PRESSURE: 84 MMHG | HEIGHT: 68 IN | WEIGHT: 229 LBS | SYSTOLIC BLOOD PRESSURE: 132 MMHG | BODY MASS INDEX: 34.71 KG/M2

## 2018-05-01 DIAGNOSIS — E11.8 TYPE 2 DIABETES MELLITUS WITH COMPLICATION, WITH LONG-TERM CURRENT USE OF INSULIN (HCC): ICD-10-CM

## 2018-05-01 DIAGNOSIS — E78.5 HYPERLIPIDEMIA, UNSPECIFIED HYPERLIPIDEMIA TYPE: ICD-10-CM

## 2018-05-01 DIAGNOSIS — E66.9 OBESITY, CLASS II, BMI 35-39.9: Primary | ICD-10-CM

## 2018-05-01 DIAGNOSIS — E66.9 OBESITY, CLASS II, BMI 35-39.9: ICD-10-CM

## 2018-05-01 DIAGNOSIS — M25.50 MULTIPLE JOINT PAIN: ICD-10-CM

## 2018-05-01 DIAGNOSIS — R12 HEARTBURN: ICD-10-CM

## 2018-05-01 DIAGNOSIS — E55.9 VITAMIN D DEFICIENCY: ICD-10-CM

## 2018-05-01 DIAGNOSIS — E66.9 OBESITY, CLASS I, BMI 30-34.9: ICD-10-CM

## 2018-05-01 DIAGNOSIS — Z79.4 TYPE 2 DIABETES MELLITUS WITH COMPLICATION, WITH LONG-TERM CURRENT USE OF INSULIN (HCC): ICD-10-CM

## 2018-05-01 DIAGNOSIS — R53.82 CHRONIC FATIGUE: ICD-10-CM

## 2018-05-01 LAB
ALBUMIN SERPL-MCNC: 3.8 G/DL (ref 3.5–5.2)
ALBUMIN/GLOB SERPL: 1.1 G/DL
ALP SERPL-CCNC: 106 U/L (ref 39–117)
ALT SERPL W P-5'-P-CCNC: 7 U/L (ref 1–33)
ANION GAP SERPL CALCULATED.3IONS-SCNC: 11.7 MMOL/L
AST SERPL-CCNC: 13 U/L (ref 1–32)
BASOPHILS # BLD AUTO: 0.04 10*3/MM3 (ref 0–0.2)
BASOPHILS NFR BLD AUTO: 0.5 % (ref 0–1.5)
BILIRUB SERPL-MCNC: 0.4 MG/DL (ref 0.1–1.2)
BUN BLD-MCNC: 9 MG/DL (ref 6–20)
BUN/CREAT SERPL: 10.6 (ref 7–25)
CALCIUM SPEC-SCNC: 9.6 MG/DL (ref 8.6–10.5)
CHLORIDE SERPL-SCNC: 101 MMOL/L (ref 98–107)
CO2 SERPL-SCNC: 28.3 MMOL/L (ref 22–29)
CREAT BLD-MCNC: 0.85 MG/DL (ref 0.57–1)
DEPRECATED RDW RBC AUTO: 47.8 FL (ref 37–54)
EOSINOPHIL # BLD AUTO: 0.1 10*3/MM3 (ref 0–0.7)
EOSINOPHIL NFR BLD AUTO: 1.3 % (ref 0.3–6.2)
ERYTHROCYTE [DISTWIDTH] IN BLOOD BY AUTOMATED COUNT: 13.8 % (ref 11.7–13)
FERRITIN SERPL-MCNC: 54.22 NG/ML (ref 13–150)
GFR SERPL CREATININE-BSD FRML MDRD: 71 ML/MIN/1.73
GLOBULIN UR ELPH-MCNC: 3.6 GM/DL
GLUCOSE BLD-MCNC: 111 MG/DL (ref 65–99)
HCT VFR BLD AUTO: 43 % (ref 35.6–45.5)
HGB BLD-MCNC: 13.5 G/DL (ref 11.9–15.5)
IMM GRANULOCYTES # BLD: 0 10*3/MM3 (ref 0–0.03)
IMM GRANULOCYTES NFR BLD: 0 % (ref 0–0.5)
IRON 24H UR-MRATE: 59 MCG/DL (ref 37–145)
LYMPHOCYTES # BLD AUTO: 1.98 10*3/MM3 (ref 0.9–4.8)
LYMPHOCYTES NFR BLD AUTO: 25.7 % (ref 19.6–45.3)
MCH RBC QN AUTO: 29.8 PG (ref 26.9–32)
MCHC RBC AUTO-ENTMCNC: 31.4 G/DL (ref 32.4–36.3)
MCV RBC AUTO: 94.9 FL (ref 80.5–98.2)
MONOCYTES # BLD AUTO: 0.4 10*3/MM3 (ref 0.2–1.2)
MONOCYTES NFR BLD AUTO: 5.2 % (ref 5–12)
NEUTROPHILS # BLD AUTO: 5.19 10*3/MM3 (ref 1.9–8.1)
NEUTROPHILS NFR BLD AUTO: 67.3 % (ref 42.7–76)
PLATELET # BLD AUTO: 206 10*3/MM3 (ref 140–500)
PMV BLD AUTO: 10.5 FL (ref 6–12)
POTASSIUM BLD-SCNC: 4.2 MMOL/L (ref 3.5–5.2)
PROT SERPL-MCNC: 7.4 G/DL (ref 6–8.5)
RBC # BLD AUTO: 4.53 10*6/MM3 (ref 3.9–5.2)
SODIUM BLD-SCNC: 141 MMOL/L (ref 136–145)
WBC NRBC COR # BLD: 7.71 10*3/MM3 (ref 4.5–10.7)

## 2018-05-01 PROCEDURE — 80053 COMPREHEN METABOLIC PANEL: CPT

## 2018-05-01 PROCEDURE — 85025 COMPLETE CBC W/AUTO DIFF WBC: CPT

## 2018-05-01 PROCEDURE — 82728 ASSAY OF FERRITIN: CPT

## 2018-05-01 PROCEDURE — 36415 COLL VENOUS BLD VENIPUNCTURE: CPT

## 2018-05-01 PROCEDURE — 83540 ASSAY OF IRON: CPT

## 2018-05-01 PROCEDURE — 99213 OFFICE O/P EST LOW 20 MIN: CPT | Performed by: NURSE PRACTITIONER

## 2018-05-01 PROCEDURE — 84425 ASSAY OF VITAMIN B-1: CPT

## 2018-05-01 PROCEDURE — 83921 ORGANIC ACID SINGLE QUANT: CPT

## 2018-05-01 NOTE — PROGRESS NOTES
[facility] BARIATR[facility] BARIATRIC SURGERY  [address]  [facility] [address] [phone]  [address]  [facility] [address] [phone]  Dept: [phone]  5/1/2018      Bisi Perla.  [id number]  [id number]  1970  female      Chief Complaint   Patient presents with   • Follow-up     6 month followup [facility] Post-Op Bariatric Surgery:   Bisi Perla is status post Laparoscopic Sleeve/HH procedure, performed on 10/23/17     HPI:   Today's weight is 104 kg (229 lb) pounds, today's BMI is Body mass index is 34.83 kg/m²., she has a  loss of 18 pounds since the last visit and her weight loss since surgery is 46 pounds. The patient reports a decreased portion size and loss of appetite.      Bisi Perla denies nausea, vomiting, dysphagia, abdominal pain or heartburn.     Diet and Exercise: Diet history reviewed and discussed with the patient. Weight loss/gains to date discussed with the patient. The patient states they are eating 40-60 grams of protein per day. She reports eating 4 meals per day, a typical portion size of 1/2 cup, eating 5 snacks per day, drinking 4 or more 8-oz. glasses of water per day, no carbonated beverage consumption. Denies regular exercise.      Supplements: BA MTV with iron and vitamin d.     Review of Systems   Constitutional: Positive for appetite change. Negative for fatigue and unexpected weight change.   HENT: Negative.    Eyes: Negative.    Respiratory: Negative.    Cardiovascular: Negative.  Negative for leg swelling.   Gastrointestinal: Positive for constipation. Negative for abdominal distention, abdominal pain, diarrhea, nausea and vomiting.   Genitourinary: Negative for difficulty urinating, frequency and urgency.   Musculoskeletal: Negative for back pain.   Skin: Negative.    Neurological: Positive for dizziness.   Psychiatric/Behavioral: Negative.    All other systems reviewed and are negative.      Patient  Active Problem List   Diagnosis   • Left-sided weakness   • Abnormal cardiac function test   • Hyperlipidemia   • Hypothyroidism   • Heartburn   • Obesity, Class I, BMI 30-34.9   • Type 2 diabetes mellitus   • Chronic fatigue   • Intermittent claudication   • Snoring   • Multiple joint pain   • Depression   • Bipolar 1 disorder   • Anemia   • Vitamin D deficiency       Past Medical History:   Diagnosis Date   • Anemia    • Anxiety    • Bipolar 1 disorder    • Diabetes mellitus    • GERD (gastroesophageal reflux disease)    • History of transfusion    • Hyperlipidemia    • Hypothyroidism 6/21/2016   • Neuropathy    • Obesity    • Sleep apnea     ON CPAP       The following portions of the patient's history were reviewed and updated as appropriate: allergies, current medications, past family history, past medical history, past social history, past surgical history and problem list.    Vitals:    05/01/18 1146   BP: 132/84   Pulse: 92   Resp: 16   Temp: 98.4 °F (36.9 °C)       Physical Exam   Constitutional: She appears well-developed and well-nourished.   Neck: No thyromegaly present.   Cardiovascular: Normal rate, regular rhythm and normal heart sounds.    Pulmonary/Chest: Effort normal and breath sounds normal. No respiratory distress. She has no wheezes.   Abdominal: Soft. Bowel sounds are normal. She exhibits no distension. There is no tenderness. There is no guarding. No hernia.   Musculoskeletal: She exhibits no edema or tenderness.   Neurological: She is alert.   Skin: Skin is warm and dry. No rash noted. No erythema.   Psychiatric: She has a normal mood and affect. Her behavior is normal.   Nursing note and vitals reviewed.      Assessment:   Post-op, the patient is doing well.     Encounter Diagnoses   Name Primary?   • Obesity, Class II, BMI 35-39.9 Yes   • Heartburn    • Vitamin D deficiency    • Type 2 diabetes mellitus with complication, with long-term current use of insulin    • Multiple joint pain    •  Chronic fatigue    • Hyperlipidemia, unspecified hyperlipidemia type    • Obesity, Class I, BMI 30-34.9        Plan:   Reminded of the importance of regular exercise for weight loss, prevention of loose skin, and prevention of excess muscle wasting as it relates to her metabolic rate. Recommended really pushing on her fluid intake to prevent dizziness and constipation as adding daily metamucil and mirilax as needed to her routine and considering taking a probiotic. 6 month labs written today to rule out vitamin deficiencies.    Encouraged patient to be sure to get plenty of lean protein per day through small frequent meals all with a protein source.   Activity restrictions: none.   Recommended patient be sure to get at least 70 grams of protein per day by eating small, frequent meals all with high lean protein choices. Be sure to limit/cut back on daily carbohydrate intake. Discussed with the patient the recommended amount of water per day to intake- half of body weight in ounces. Reviewed vitamin requirements. Be sure to do routine exercise, 150 minutes per week minimum, including both cardio and strength training.     Instructions / Recommendations: dietary counseling recommended, recommended a daily protein intake of  grams, vitamin supplement(s) recommended, recommended exercising at least 150 minutes per week, behavior modifications recommended and instructed to call the office for concerns, questions, or problems.     The patient was instructed to follow up in 3 months .     The patient was counseled regarding exercise, protein intake, fluid intake, lab work. Total time spent face to face was 20 minutes and 15 minutes was spent counseling.

## 2018-05-03 LAB — METHYLMALONATE SERPL-SCNC: 113 NMOL/L (ref 0–378)

## 2018-05-04 LAB — VIT B1 BLD-SCNC: 100.8 NMOL/L (ref 66.5–200)

## 2018-08-31 RX ORDER — HYDROGEN PEROXIDE 3 %
20 SOLUTION, NON-ORAL MISCELLANEOUS DAILY
COMMUNITY
End: 2018-11-01

## 2018-08-31 RX ORDER — GABAPENTIN 600 MG/1
600 TABLET ORAL 3 TIMES DAILY
COMMUNITY
End: 2019-10-31

## 2018-08-31 RX ORDER — DIVALPROEX SODIUM 500 MG/1
500 TABLET, FILM COATED, EXTENDED RELEASE ORAL NIGHTLY
COMMUNITY
End: 2019-06-19

## 2018-08-31 RX ORDER — ATORVASTATIN CALCIUM 20 MG/1
20 TABLET, FILM COATED ORAL DAILY
COMMUNITY
End: 2019-03-26 | Stop reason: SDUPTHER

## 2018-08-31 RX ORDER — FLUOXETINE HYDROCHLORIDE 60 MG/1
60 TABLET, FILM COATED ORAL; ORAL NIGHTLY
COMMUNITY
End: 2019-03-26

## 2018-08-31 RX ORDER — CARBIDOPA AND LEVODOPA 25; 100 MG/1; MG/1
1 TABLET ORAL 3 TIMES DAILY
COMMUNITY
End: 2019-03-26 | Stop reason: SDUPTHER

## 2018-08-31 RX ORDER — HYDROCHLOROTHIAZIDE 25 MG/1
25 TABLET ORAL DAILY PRN
COMMUNITY
End: 2019-12-03 | Stop reason: ALTCHOICE

## 2018-08-31 RX ORDER — LEVOTHYROXINE SODIUM 88 UG/1
88 TABLET ORAL DAILY
COMMUNITY

## 2018-08-31 RX ORDER — QUETIAPINE FUMARATE 400 MG/1
400 TABLET, FILM COATED ORAL NIGHTLY
COMMUNITY
End: 2018-11-01

## 2018-09-05 ENCOUNTER — OFFICE VISIT (OUTPATIENT)
Dept: SURGERY | Facility: CLINIC | Age: 48
End: 2018-09-05
Payer: MEDICAID

## 2018-09-05 VITALS
SYSTOLIC BLOOD PRESSURE: 103 MMHG | BODY MASS INDEX: 38.06 KG/M2 | HEIGHT: 68 IN | WEIGHT: 251.13 LBS | DIASTOLIC BLOOD PRESSURE: 59 MMHG

## 2018-09-05 DIAGNOSIS — K85.10 GALLSTONE PANCREATITIS: Primary | ICD-10-CM

## 2018-09-05 PROCEDURE — 99024 POSTOP FOLLOW-UP VISIT: CPT | Mod: ,,, | Performed by: SURGERY

## 2018-09-05 NOTE — PROGRESS NOTES
Subjective:       Patient ID: Sabrina Black is a 48 y.o. female.    Chief Complaint: Post-op Evaluation ([facility] DOS 8/27/18 Lap Plii )      HPI:  S/p lap pili as inpatient after episode of gallstone pancreatitis.  She has no complaints.  Feeling well.  Afebrile    Review of Systems   Constitutional: Negative for appetite change, chills, fever and unexpected weight change.   HENT: Negative for hearing loss, rhinorrhea, sore throat and voice change.    Eyes: Negative for photophobia and visual disturbance.   Respiratory: Negative for cough, choking and shortness of breath.    Cardiovascular: Negative for chest pain, palpitations and leg swelling.   Gastrointestinal: Negative for abdominal pain, blood in stool, constipation, diarrhea, nausea and vomiting.   Endocrine: Negative for cold intolerance, heat intolerance, polydipsia and polyuria.   Musculoskeletal: Negative for arthralgias, back pain, joint swelling and neck stiffness.   Skin: Negative for color change, pallor and rash.   Neurological: Positive for tremors. Negative for dizziness, seizures, syncope and headaches.   Hematological: Negative for adenopathy. Does not bruise/bleed easily.   Psychiatric/Behavioral: Negative for agitation, behavioral problems and confusion.       Objective:      Physical Exam   Constitutional: She is oriented to person, place, and time. She is cooperative. No distress.   Pulmonary/Chest: Effort normal. No respiratory distress.   Abdominal: Soft. She exhibits no distension. There is no tenderness. There is no rebound and no guarding.   Neurological: She is alert and oriented to person, place, and time.   Skin:   Incisions are clean, dry and intact  There is no evidence of infection, hematoma or seroma        Assessment/Plan:   Gallstone pancreatitis      S/p lap pili.  Doing well.  RTC PRN   Follow-up if symptoms worsen or fail to improve.

## 2018-11-01 ENCOUNTER — OFFICE VISIT (OUTPATIENT)
Dept: UROGYNECOLOGY | Facility: CLINIC | Age: 48
End: 2018-11-01
Payer: MEDICAID

## 2018-11-01 VITALS
HEIGHT: 68 IN | TEMPERATURE: 99 F | DIASTOLIC BLOOD PRESSURE: 67 MMHG | HEART RATE: 74 BPM | BODY MASS INDEX: 31.48 KG/M2 | SYSTOLIC BLOOD PRESSURE: 107 MMHG | WEIGHT: 207.69 LBS

## 2018-11-01 DIAGNOSIS — N39.41 URGE INCONTINENCE OF URINE: ICD-10-CM

## 2018-11-01 DIAGNOSIS — R35.0 URINARY FREQUENCY: Primary | ICD-10-CM

## 2018-11-01 LAB
BILIRUB SERPL-MCNC: NORMAL MG/DL
BLOOD URINE, POC: 250
COLOR, POC UA: YELLOW
GLUCOSE UR QL STRIP: 100
KETONES UR QL STRIP: NORMAL
LEUKOCYTE ESTERASE URINE, POC: NORMAL
NITRITE, POC UA: NORMAL
PH, POC UA: 5
PROTEIN, POC: NORMAL
SPECIFIC GRAVITY, POC UA: 1.02
UROBILINOGEN, POC UA: 1

## 2018-11-01 PROCEDURE — 99204 OFFICE O/P NEW MOD 45 MIN: CPT | Mod: S$PBB,,, | Performed by: NURSE PRACTITIONER

## 2018-11-01 PROCEDURE — 99999 PR PBB SHADOW E&M-NEW PATIENT-LVL IV: CPT | Mod: PBBFAC,,, | Performed by: NURSE PRACTITIONER

## 2018-11-01 PROCEDURE — 87086 URINE CULTURE/COLONY COUNT: CPT

## 2018-11-01 PROCEDURE — 81002 URINALYSIS NONAUTO W/O SCOPE: CPT | Mod: PBBFAC,PO | Performed by: NURSE PRACTITIONER

## 2018-11-01 PROCEDURE — 99204 OFFICE O/P NEW MOD 45 MIN: CPT | Mod: PBBFAC,PO | Performed by: NURSE PRACTITIONER

## 2018-11-01 NOTE — LETTER
November 1, 2018      Qasim Galindo MD  [address] [place] [address] [address]  [place] [address]           [place] - Urogynecology  [facility], [address]  [place] [address]  Phone: [phone]  Fax: [phone]          Patient: Sabrina Black   MR Number: [id number]   YOB: 1970   Date of Visit: 11/1/2018       Dear Dr. Qasim Galindo:    Thank you for referring Sabrina Black to me for evaluation. Attached you will find relevant portions of my assessment and plan of care.    If you have questions, please do not hesitate to call me. I look forward to following Sabrina Black along with you.    Sincerely,    KATE Kam, [facility]    Enclosure  CC:  No Recipients    If you would like to receive this communication electronically, please contact [email or web address] or [phone] to request more information on [facility] access.    For providers and/or their staff who would like to refer a patient to [facility], please contact us through our one-stop-shop provider referral line, [facility], at [phone].    If you feel you have received this communication in error or would no longer like to receive these types of communications, please e-mail [email or web address]

## 2018-11-01 NOTE — PROGRESS NOTES
Subjective:       Patient ID: Sabrina Black is a 48 y.o. female.    Chief Complaint: problems bladder control    HPI  Sabrina Black is a 48 y.o. female who is new to me, presents today for bladder control problems.  She has noticed this problem for the past month or so ever since she had her gall bladder removed.  She has frequency x q 30 minutes- 1 hour during the day and nocturia x 1-2.  She has urgency and UUI.  She denies any CHIRAG.  She denies any feeling of PVF.  She denies any history of recurrent UTI or kidney stones.  She denies any dysuria.  She drinks mostly water but sometimes has a diet Dr. Pepper.  She denies any vaginal bleeding/bulging or discharge.  She is not sexually active.  She is nulliparous and is up to date on her WWE.  She had an ablation a few years ago and does not have any periods.  She denies any other acute complaints/concerns at this time.    Review of Systems   Constitutional: Negative for activity change, fever and unexpected weight change.   HENT: Negative for hearing loss.    Eyes: Negative for visual disturbance.   Respiratory: Negative for shortness of breath and wheezing.    Cardiovascular: Negative for chest pain, palpitations and leg swelling.   Gastrointestinal: Negative for abdominal pain, constipation and diarrhea.   Genitourinary: Positive for frequency and urgency. Negative for dyspareunia, dysuria, vaginal bleeding and vaginal discharge.   Musculoskeletal: Negative for gait problem and neck pain.   Skin: Negative for rash and wound.   Allergic/Immunologic: Negative for immunocompromised state.   Neurological: Negative for tremors, speech difficulty and weakness.   Hematological: Does not bruise/bleed easily.   Psychiatric/Behavioral: Negative for agitation and confusion.       Objective:      Physical Exam   Constitutional: She is oriented to person, place, and time. She appears well-developed and well-nourished. No distress.   HENT:   Head: Normocephalic and  atraumatic.   Neck: Neck supple. No thyromegaly present.   Cardiovascular:   No swelling in BLE   Pulmonary/Chest: Effort normal. No respiratory distress.   Abdominal: Soft. There is no tenderness. No hernia.   Musculoskeletal: Normal range of motion.   Neurological: She is alert and oriented to person, place, and time. She displays tremor.   Tremors of BUE   Skin: Skin is warm and dry. No rash noted.   Psychiatric: She has a normal mood and affect. Her behavior is normal.     Pelvic Exam:  Deferred per pt request      Assessment:       1. Urinary frequency    2. Urge incontinence of urine        Plan:       Urinary frequency- Her UA today is slightly suspicious for UTI.  We will send it for culture as noted below.  If it is positive we will treat accordingly.  If it is negative, we will try myrbetriq 50 mg  -     POCT URINE DIPSTICK WITHOUT MICROSCOPE  -     Urine culture    Urge incontinence of urine- as noted above    RTC 1 month if her symptoms have not resolved.

## 2018-11-03 LAB — BACTERIA UR CULT: NORMAL

## 2018-12-04 ENCOUNTER — OFFICE VISIT (OUTPATIENT)
Dept: UROGYNECOLOGY | Facility: CLINIC | Age: 48
End: 2018-12-04
Payer: MEDICAID

## 2018-12-04 ENCOUNTER — TELEPHONE (OUTPATIENT)
Dept: UROGYNECOLOGY | Facility: CLINIC | Age: 48
End: 2018-12-04

## 2018-12-04 VITALS
HEART RATE: 71 BPM | BODY MASS INDEX: 31.17 KG/M2 | SYSTOLIC BLOOD PRESSURE: 138 MMHG | WEIGHT: 205.69 LBS | HEIGHT: 68 IN | DIASTOLIC BLOOD PRESSURE: 88 MMHG

## 2018-12-04 DIAGNOSIS — R35.0 URINARY FREQUENCY: ICD-10-CM

## 2018-12-04 DIAGNOSIS — N39.41 URGE INCONTINENCE OF URINE: Primary | ICD-10-CM

## 2018-12-04 PROCEDURE — 99213 OFFICE O/P EST LOW 20 MIN: CPT | Mod: S$PBB,,, | Performed by: NURSE PRACTITIONER

## 2018-12-04 PROCEDURE — 99999 PR PBB SHADOW E&M-EST. PATIENT-LVL III: CPT | Mod: PBBFAC,,, | Performed by: NURSE PRACTITIONER

## 2018-12-04 PROCEDURE — 99213 OFFICE O/P EST LOW 20 MIN: CPT | Mod: PBBFAC,PO | Performed by: NURSE PRACTITIONER

## 2018-12-04 RX ORDER — OXYBUTYNIN CHLORIDE 5 MG/1
5 TABLET, EXTENDED RELEASE ORAL DAILY
Qty: 30 TABLET | Refills: 6 | Status: SHIPPED | OUTPATIENT
Start: 2018-12-04 | End: 2019-03-26 | Stop reason: SDUPTHER

## 2018-12-04 RX ORDER — MIRTAZAPINE 15 MG/1
TABLET, FILM COATED ORAL
Refills: 0 | COMMUNITY
Start: 2018-11-23 | End: 2019-03-26 | Stop reason: SDUPTHER

## 2018-12-04 NOTE — TELEPHONE ENCOUNTER
We will have to try oxybutynin to see how this does for her.  I'm sending in the RX.  The most common side effect is dry mouth and/or constipation.  If she has problems with this or this medication does not work as well, we can try to get a prior auth. For the myrbetriq

## 2018-12-04 NOTE — PROGRESS NOTES
Subjective:       Patient ID: Sabrina Black is a 48 y.o. female.    Chief Complaint: Follow-up (1 month) medication    HPI  Sabrina Black is a 48 y.o. female who presents today for medication follow up.  She was seen in November and started on myrbetriq 50 mg.  She has been taking this for the past month without any side effects.  Her frequency has decreased to about every 2-3 hours during the day.  She has nocturia x 1.  She denies any feeling of PVF.  Her incontinence for the most part has completely stopped.  Her urgency is under control and she now feels that she has the ability to make it to the bathroom on time.  She denies any other acute complaints/concerns and would like to continue with the medication.    Review of Systems   Constitutional: Negative for activity change, fever and unexpected weight change.   HENT: Negative for hearing loss.    Eyes: Negative for visual disturbance.   Respiratory: Negative for shortness of breath and wheezing.    Cardiovascular: Negative for chest pain, palpitations and leg swelling.   Gastrointestinal: Negative for abdominal pain, constipation and diarrhea.   Genitourinary: Positive for frequency. Negative for dyspareunia, dysuria, urgency, vaginal bleeding and vaginal discharge.   Musculoskeletal: Negative for gait problem and neck pain.   Skin: Negative for rash and wound.   Allergic/Immunologic: Negative for immunocompromised state.   Neurological: Negative for tremors, speech difficulty and weakness.   Hematological: Does not bruise/bleed easily.   Psychiatric/Behavioral: Negative for agitation and confusion.       Objective:      Physical Exam   Constitutional: She is oriented to person, place, and time. She appears well-developed and well-nourished. No distress.   HENT:   Head: Normocephalic and atraumatic.   Neck: Neck supple. No thyromegaly present.   Pulmonary/Chest: Effort normal. No respiratory distress.   Abdominal: Soft. There is no tenderness. No  hernia.   Musculoskeletal: Normal range of motion.   Neurological: She is alert and oriented to person, place, and time.   Skin: Skin is warm and dry. No rash noted.   Psychiatric: She has a normal mood and affect. Her behavior is normal.           Assessment:       1. Urge incontinence of urine    2. Urinary frequency        Plan:       Urge incontinence of urine- pt will continue myrbetriq as noted below    Urinary frequency- continue myrbetriq as noted  -     mirabegron (MYRBETRIQ) 50 mg Tb24; Take 1 tablet (50 mg total) by mouth once daily.  Dispense: 30 tablet; Refill: 6    RTC 3 months or PRN

## 2018-12-05 NOTE — TELEPHONE ENCOUNTER
Spoke to pt and informed that she  Is to try the oxybutynin and if  It does not work as well or side affects then to call us and let us know.

## 2019-03-26 ENCOUNTER — OFFICE VISIT (OUTPATIENT)
Dept: UROGYNECOLOGY | Facility: CLINIC | Age: 49
End: 2019-03-26
Payer: MEDICAID

## 2019-03-26 VITALS
SYSTOLIC BLOOD PRESSURE: 137 MMHG | DIASTOLIC BLOOD PRESSURE: 79 MMHG | WEIGHT: 215.19 LBS | HEIGHT: 68 IN | HEART RATE: 84 BPM | BODY MASS INDEX: 32.61 KG/M2

## 2019-03-26 DIAGNOSIS — R35.0 URINARY FREQUENCY: Primary | ICD-10-CM

## 2019-03-26 DIAGNOSIS — N39.41 URGE INCONTINENCE OF URINE: ICD-10-CM

## 2019-03-26 PROCEDURE — 99999 PR PBB SHADOW E&M-EST. PATIENT-LVL III: ICD-10-PCS | Mod: PBBFAC,,, | Performed by: NURSE PRACTITIONER

## 2019-03-26 PROCEDURE — 99213 OFFICE O/P EST LOW 20 MIN: CPT | Mod: S$PBB,,, | Performed by: NURSE PRACTITIONER

## 2019-03-26 PROCEDURE — 99213 OFFICE O/P EST LOW 20 MIN: CPT | Mod: PBBFAC,PO | Performed by: NURSE PRACTITIONER

## 2019-03-26 PROCEDURE — 99999 PR PBB SHADOW E&M-EST. PATIENT-LVL III: CPT | Mod: PBBFAC,,, | Performed by: NURSE PRACTITIONER

## 2019-03-26 PROCEDURE — 99213 PR OFFICE/OUTPT VISIT, EST, LEVL III, 20-29 MIN: ICD-10-PCS | Mod: S$PBB,,, | Performed by: NURSE PRACTITIONER

## 2019-03-26 RX ORDER — CARBIDOPA AND LEVODOPA 25; 100 MG/1; MG/1
1 TABLET ORAL 3 TIMES DAILY
COMMUNITY

## 2019-03-26 RX ORDER — TRAMADOL HYDROCHLORIDE 50 MG/1
TABLET ORAL
Refills: 0 | COMMUNITY
Start: 2019-03-18 | End: 2019-04-04

## 2019-03-26 RX ORDER — PRAMIPEXOLE DIHYDROCHLORIDE 0.12 MG/1
0.12 TABLET ORAL 3 TIMES DAILY
COMMUNITY

## 2019-03-26 RX ORDER — ATORVASTATIN CALCIUM 20 MG/1
20 TABLET, FILM COATED ORAL DAILY
COMMUNITY

## 2019-03-26 RX ORDER — HYDROXYZINE HYDROCHLORIDE 25 MG/1
25 TABLET, FILM COATED ORAL
COMMUNITY
End: 2019-10-31

## 2019-03-26 RX ORDER — MIRTAZAPINE 30 MG/1
TABLET, FILM COATED ORAL
Refills: 1 | COMMUNITY
Start: 2019-03-08 | End: 2019-04-04

## 2019-03-26 RX ORDER — ERGOCALCIFEROL 1.25 MG/1
50000 CAPSULE ORAL
COMMUNITY
End: 2019-10-31

## 2019-03-26 RX ORDER — OXYBUTYNIN CHLORIDE 5 MG/1
5 TABLET, EXTENDED RELEASE ORAL DAILY
Qty: 30 TABLET | Refills: 11 | Status: SHIPPED | OUTPATIENT
Start: 2019-03-26 | End: 2019-12-03 | Stop reason: ALTCHOICE

## 2019-03-26 NOTE — PROGRESS NOTES
Subjective:       Patient ID: Sabrina Black is a 48 y.o. female.    Chief Complaint: Follow-up (f/u on meds)    HPI  Sabrina Black is a 48 y.o. female who presents today for follow up on medication.  She was originally seen 11/01/18.  She was started on myrbetriq and did very well on it.  Her insurance did not cover the myrbetriq so she was switched to ditropan.  She has been doing well on this as well.  She has some dry mouth, but it is manageable for her.  She has frequency about every hour during the day.  Part of this might be the medication, but part of this is that she increased her soda intake for a little while.  She is working on decreasing her soda consumption again.  She has occasional nocturia x 1.  She denies any feeling of PVF or any incontinence.  She denies any vaginal complaints/concerns at this time.  She is overall happy with her urinary status right now.    Review of Systems   Constitutional: Negative for activity change, fever and unexpected weight change.   HENT: Negative for hearing loss.    Eyes: Negative for visual disturbance.   Respiratory: Negative for shortness of breath and wheezing.    Cardiovascular: Negative for chest pain, palpitations and leg swelling.   Gastrointestinal: Negative for abdominal pain, constipation and diarrhea.   Genitourinary: Positive for frequency. Negative for dyspareunia, dysuria, urgency, vaginal bleeding and vaginal discharge.   Musculoskeletal: Negative for gait problem and neck pain.   Skin: Negative for rash and wound.   Allergic/Immunologic: Negative for immunocompromised state.   Neurological: Negative for tremors, speech difficulty and weakness.   Hematological: Does not bruise/bleed easily.   Psychiatric/Behavioral: Negative for agitation and confusion.       Objective:      Physical Exam   Constitutional: She is oriented to person, place, and time. She appears well-developed and well-nourished. No distress.   HENT:   Head: Normocephalic  and atraumatic.   Neck: Neck supple. No thyromegaly present.   Pulmonary/Chest: Effort normal. No respiratory distress.   Abdominal: Soft. There is no tenderness. No hernia.   Musculoskeletal: Normal range of motion.   Neurological: She is alert and oriented to person, place, and time.   Skin: Skin is warm and dry. No rash noted.   Psychiatric: She has a normal mood and affect. Her behavior is normal.         Assessment:       1. Urinary frequency    2. Urge incontinence of urine        Plan:       Urinary frequency- she will continue with the ditropan at this time.  If she develops any worsening of her symptoms are side effects, she will let us know.  -     oxybutynin (DITROPAN-XL) 5 MG TR24; Take 1 tablet (5 mg total) by mouth once daily.  Dispense: 30 tablet; Refill: 11    Urge incontinence of urine- as noted above    RTC 1 year or PRN

## 2019-04-04 ENCOUNTER — OFFICE VISIT (OUTPATIENT)
Dept: ORTHOPEDICS | Facility: CLINIC | Age: 49
End: 2019-04-04
Payer: MEDICAID

## 2019-04-04 VITALS
BODY MASS INDEX: 32.28 KG/M2 | WEIGHT: 213 LBS | HEIGHT: 68 IN | DIASTOLIC BLOOD PRESSURE: 80 MMHG | SYSTOLIC BLOOD PRESSURE: 130 MMHG | HEART RATE: 83 BPM

## 2019-04-04 DIAGNOSIS — M17.11 PRIMARY OSTEOARTHRITIS OF RIGHT KNEE: Primary | ICD-10-CM

## 2019-04-04 PROCEDURE — 99203 OFFICE O/P NEW LOW 30 MIN: CPT | Mod: ,,, | Performed by: ORTHOPAEDIC SURGERY

## 2019-04-04 PROCEDURE — 99203 PR OFFICE/OUTPT VISIT, NEW, LEVL III, 30-44 MIN: ICD-10-PCS | Mod: ,,, | Performed by: ORTHOPAEDIC SURGERY

## 2019-04-24 DIAGNOSIS — M17.11 PRIMARY OSTEOARTHRITIS OF RIGHT KNEE: Primary | ICD-10-CM

## 2019-05-07 ENCOUNTER — TELEPHONE (OUTPATIENT)
Dept: UROGYNECOLOGY | Facility: CLINIC | Age: 49
End: 2019-05-07

## 2019-05-07 DIAGNOSIS — R35.0 URINARY FREQUENCY: ICD-10-CM

## 2019-05-07 NOTE — TELEPHONE ENCOUNTER
----- Message from Nati Benderza sent at 5/7/2019 10:55 AM CDT -----  Contact: self [phone]  She said the oxybutin is making her mouth too dry. Is there something else you could prescribe? Please let her know.  Thank you!

## 2019-05-09 NOTE — TELEPHONE ENCOUNTER
I will send in myrbetriq for her to take.  She should stop the ditropan when she gets the myrbetriq.

## 2019-05-15 ENCOUNTER — TELEPHONE (OUTPATIENT)
Dept: UROGYNECOLOGY | Facility: CLINIC | Age: 49
End: 2019-05-15

## 2019-05-15 RX ORDER — SOLIFENACIN SUCCINATE 5 MG/1
5 TABLET, FILM COATED ORAL DAILY
Qty: 30 TABLET | Refills: 6 | Status: SHIPPED | OUTPATIENT
Start: 2019-05-15 | End: 2019-06-14

## 2019-05-15 NOTE — TELEPHONE ENCOUNTER
Spoke with Emely at [facility] [phone]. Vesicaire 5mg or 10mg is on formulary and would be covered.

## 2019-05-15 NOTE — TELEPHONE ENCOUNTER
vesicare 5 mg sent.  If she has any side effects with this medication or if it is not effective, please let us know.

## 2019-05-22 LAB
ALBUMIN SERPL-MCNC: 3.6 G/DL (ref 3.1–4.7)
ALP SERPL-CCNC: 91 IU/L (ref 40–104)
ALT (SGPT): 6 IU/L (ref 3–33)
AST SERPL-CCNC: 15 IU/L (ref 10–40)
BILIRUB SERPL-MCNC: 0.9 MG/DL (ref 0.3–1)
BUN SERPL-MCNC: 11 MG/DL (ref 8–20)
CALCIUM SERPL-MCNC: 9 MG/DL (ref 7.7–10.4)
CHLORIDE: 103 MMOL/L (ref 98–110)
CO2 SERPL-SCNC: 24.3 MMOL/L (ref 22.8–31.6)
CREATININE: 0.73 MG/DL (ref 0.6–1.4)
GLUCOSE: 94 MG/DL (ref 70–99)
HCT VFR BLD AUTO: 39.8 % (ref 36–48)
HGB BLD-MCNC: 12.9 G/DL (ref 12–15)
MCH RBC QN AUTO: 28 PG (ref 25–35)
MCHC RBC AUTO-ENTMCNC: 32.4 G/DL (ref 31–36)
MCV RBC AUTO: 86.3 FL (ref 79–98)
NUCLEATED RBCS: 0 %
PLATELET # BLD AUTO: 226 K/UL (ref 140–440)
POTASSIUM SERPL-SCNC: 3.9 MMOL/L (ref 3.5–5)
PROT SERPL-MCNC: 7.2 G/DL (ref 6–8.2)
RBC # BLD AUTO: 4.61 M/UL (ref 3.5–5.5)
SODIUM: 136 MMOL/L (ref 134–144)
WBC # BLD AUTO: 8.1 K/UL (ref 5–10)

## 2019-05-28 ENCOUNTER — TELEPHONE (OUTPATIENT)
Dept: ORTHOPEDICS | Facility: CLINIC | Age: 49
End: 2019-05-28

## 2019-05-28 NOTE — TELEPHONE ENCOUNTER
Spoke with pt, as per Dr. Blanc, will call in Cipro and repeat urine on 6/4/19. Called in to [facility]

## 2019-06-04 LAB
MRSA SCREEN BY PCR: NORMAL

## 2019-06-05 ENCOUNTER — HISTORICAL (OUTPATIENT)
Dept: ADMINISTRATIVE | Facility: HOSPITAL | Age: 49
End: 2019-06-05

## 2019-06-06 LAB
HCT VFR BLD AUTO: 37.1 % (ref 36–48)
HGB BLD-MCNC: 12.2 G/DL (ref 12–15)
MCH RBC QN AUTO: 28.3 PG (ref 25–35)
MCHC RBC AUTO-ENTMCNC: 32.9 G/DL (ref 31–36)
MCV RBC AUTO: 86.1 FL (ref 79–98)
NUCLEATED RBCS: 0 %
PLATELET # BLD AUTO: 188 K/UL (ref 140–440)
RBC # BLD AUTO: 4.31 M/UL (ref 3.5–5.5)
WBC # BLD AUTO: 17.1 K/UL (ref 5–10)

## 2019-06-12 DIAGNOSIS — M17.11 PRIMARY OSTEOARTHRITIS OF RIGHT KNEE: Primary | ICD-10-CM

## 2019-06-12 RX ORDER — OXYCODONE AND ACETAMINOPHEN 7.5; 325 MG/1; MG/1
1 TABLET ORAL EVERY 6 HOURS PRN
Qty: 28 TABLET | Refills: 0 | Status: SHIPPED | OUTPATIENT
Start: 2019-06-12 | End: 2019-06-19

## 2019-06-12 NOTE — TELEPHONE ENCOUNTER
----- Message from Fabiola Collier sent at 6/12/2019 10:06 AM CDT -----  Contact: Patient  Pt called said that Dr. Blanc did a procedure last Wednesday for her, she stated that she had starting slacking off on taking the pain pills and asked for 1 more refill.    PTs # [phone]

## 2019-06-19 ENCOUNTER — OFFICE VISIT (OUTPATIENT)
Dept: ORTHOPEDICS | Facility: CLINIC | Age: 49
End: 2019-06-19
Payer: MEDICAID

## 2019-06-19 VITALS
SYSTOLIC BLOOD PRESSURE: 120 MMHG | DIASTOLIC BLOOD PRESSURE: 70 MMHG | WEIGHT: 220 LBS | HEIGHT: 68 IN | BODY MASS INDEX: 33.34 KG/M2 | HEART RATE: 84 BPM

## 2019-06-19 DIAGNOSIS — M17.11 PRIMARY OSTEOARTHRITIS OF RIGHT KNEE: Primary | ICD-10-CM

## 2019-06-19 DIAGNOSIS — Z96.651 STATUS POST TOTAL KNEE REPLACEMENT, RIGHT: Primary | ICD-10-CM

## 2019-06-19 PROCEDURE — 99024 PR POST-OP FOLLOW-UP VISIT: ICD-10-PCS | Mod: ,,, | Performed by: ORTHOPAEDIC SURGERY

## 2019-06-19 PROCEDURE — 99024 POSTOP FOLLOW-UP VISIT: CPT | Mod: ,,, | Performed by: ORTHOPAEDIC SURGERY

## 2019-06-19 RX ORDER — TRAZODONE HYDROCHLORIDE 150 MG/1
150 TABLET ORAL NIGHTLY
Refills: 1 | COMMUNITY
Start: 2019-06-09

## 2019-06-19 RX ORDER — FLUOXETINE HYDROCHLORIDE 20 MG/1
CAPSULE ORAL
Refills: 1 | COMMUNITY
Start: 2019-06-09 | End: 2019-10-31

## 2019-06-19 RX ORDER — OXYCODONE AND ACETAMINOPHEN 7.5; 325 MG/1; MG/1
1 TABLET ORAL EVERY 6 HOURS PRN
Qty: 28 TABLET | Refills: 0 | Status: SHIPPED | OUTPATIENT
Start: 2019-06-19 | End: 2019-06-26

## 2019-06-19 RX ORDER — SOLIFENACIN SUCCINATE 5 MG/1
TABLET, FILM COATED ORAL
COMMUNITY
Start: 2019-05-16 | End: 2019-07-30

## 2019-06-19 RX ORDER — APIXABAN 2.5 MG/1
TABLET, FILM COATED ORAL
Refills: 0 | COMMUNITY
Start: 2019-06-06 | End: 2019-10-31

## 2019-06-19 NOTE — PROGRESS NOTES
Pt came in today for suture removal of R-TKA. Pt had removed the steri strips and cut her sutures herself. Incision looks good, no drainage but a small amount of redness noted to lateral side. No warmth or swelling. Told to call if anything changes. Return appointment made.

## 2019-07-01 ENCOUNTER — TELEPHONE (OUTPATIENT)
Dept: ORTHOPEDICS | Facility: CLINIC | Age: 49
End: 2019-07-01

## 2019-07-01 NOTE — TELEPHONE ENCOUNTER
Spoke with pt, she needs a form sent in to her medicaid to see about getting her pain meds approved. Will send it in for her.

## 2019-07-01 NOTE — TELEPHONE ENCOUNTER
Spoke with pt, she states she called Medicaid and they told her the form was not filled out and they are dening her pain meds. I explained that I just faxed this in for her earlier and I am not sure they got to it this fast but I will try and call and see if there is anything else I can do.

## 2019-07-01 NOTE — TELEPHONE ENCOUNTER
----- Message from Darlene Isabel sent at 7/1/2019  8:23 AM CDT -----  Contact: Patient  Pharmacy sent you authorization for her pain medication. Have you receive it?

## 2019-07-01 NOTE — TELEPHONE ENCOUNTER
----- Message from Maryellen Friedman LPN sent at 7/1/2019  8:49 AM CDT -----  Contact: Nu at [facility]   [phone]. Nu at [facility] called regarding PA for the percocet script. She wants to know if PA request has been received by fax to make sure she is sending it correct #. Patient also stated we have not received it. Nu would like you to call her back

## 2019-07-01 NOTE — TELEPHONE ENCOUNTER
----- Message from Raeann Del Real sent at 7/1/2019 11:12 AM CDT -----  Contact: Patient   PT called and she is crying and saying that she is in pain and tired of suffering. She said that she needs medication and cannot take the pain anymore.       PTs # [phone]

## 2019-07-02 DIAGNOSIS — Z96.651 S/P TOTAL KNEE ARTHROPLASTY, RIGHT: Primary | ICD-10-CM

## 2019-07-02 RX ORDER — OXYCODONE AND ACETAMINOPHEN 7.5; 325 MG/1; MG/1
1 TABLET ORAL EVERY 8 HOURS PRN
Qty: 21 TABLET | Refills: 0 | Status: SHIPPED | OUTPATIENT
Start: 2019-07-02 | End: 2019-07-09

## 2019-07-02 RX ORDER — CIPROFLOXACIN 500 MG/1
TABLET ORAL
Qty: 14 TABLET | Refills: 0 | Status: SHIPPED | OUTPATIENT
Start: 2019-07-02 | End: 2019-10-31

## 2019-07-02 NOTE — TELEPHONE ENCOUNTER
Fabiola spoke with pt, new script written for the pt and form sent in to [facility].

## 2019-07-02 NOTE — TELEPHONE ENCOUNTER
----- Message from Darlene Isabel sent at 7/1/2019  2:44 PM CDT -----  Contact: Janey Murphy [facility] [phone]  Medication prior auth.

## 2019-07-09 RX ORDER — CIPROFLOXACIN 500 MG/1
TABLET ORAL
Qty: 14 TABLET | Refills: 0 | OUTPATIENT
Start: 2019-07-09

## 2019-07-11 DIAGNOSIS — Z96.651 STATUS POST TOTAL KNEE REPLACEMENT, RIGHT: Primary | ICD-10-CM

## 2019-07-11 RX ORDER — OXYCODONE AND ACETAMINOPHEN 5; 325 MG/1; MG/1
1 TABLET ORAL EVERY 6 HOURS PRN
Qty: 28 TABLET | Refills: 0 | Status: SHIPPED | OUTPATIENT
Start: 2019-07-11 | End: 2019-07-18

## 2019-07-11 NOTE — TELEPHONE ENCOUNTER
----- Message from Lyssa Lloyd sent at 7/11/2019  9:06 AM CDT -----  Contact: patient  Needs a rx for oxycodone pts# [phone]

## 2019-07-23 ENCOUNTER — TELEPHONE (OUTPATIENT)
Dept: ORTHOPEDICS | Facility: CLINIC | Age: 49
End: 2019-07-23

## 2019-07-23 DIAGNOSIS — M17.11 PRIMARY OSTEOARTHRITIS OF RIGHT KNEE: Primary | ICD-10-CM

## 2019-07-23 RX ORDER — OXYCODONE AND ACETAMINOPHEN 5; 325 MG/1; MG/1
1 TABLET ORAL EVERY 6 HOURS PRN
Qty: 28 TABLET | Refills: 0 | Status: SHIPPED | OUTPATIENT
Start: 2019-07-23 | End: 2019-10-31

## 2019-07-23 NOTE — TELEPHONE ENCOUNTER
----- Message from Darlene Isabel sent at 7/23/2019  8:45 AM CDT -----  Contact: Patient  ( Dr Blanc pt )  Requesting refill on her Oxycodone 5/325 mg. Last refill 7/12/19

## 2019-07-28 DIAGNOSIS — Z96.651 PRESENCE OF TOTAL RIGHT KNEE JOINT PROSTHESIS: Primary | ICD-10-CM

## 2019-07-29 ENCOUNTER — DOCUMENTATION ONLY (OUTPATIENT)
Dept: REHABILITATION | Facility: HOSPITAL | Age: 49
End: 2019-07-29

## 2019-07-29 PROBLEM — Z96.651 TOTAL KNEE REPLACEMENT STATUS, RIGHT: Status: ACTIVE | Noted: 2019-07-29

## 2019-07-29 NOTE — PROGRESS NOTES
Physical Therapy      Sabrina Shoshana Black   MRN: [id number]         Patient cancelled today's session due to over sleeping.       CX:  1  NS: 0

## 2019-07-30 ENCOUNTER — OFFICE VISIT (OUTPATIENT)
Dept: ORTHOPEDICS | Facility: CLINIC | Age: 49
End: 2019-07-30
Payer: MEDICAID

## 2019-07-30 VITALS
WEIGHT: 216 LBS | DIASTOLIC BLOOD PRESSURE: 68 MMHG | SYSTOLIC BLOOD PRESSURE: 120 MMHG | HEIGHT: 68 IN | HEART RATE: 84 BPM | BODY MASS INDEX: 32.74 KG/M2

## 2019-07-30 DIAGNOSIS — Z96.651 S/P TOTAL KNEE REPLACEMENT, RIGHT: Primary | ICD-10-CM

## 2019-07-30 PROCEDURE — 99024 PR POST-OP FOLLOW-UP VISIT: ICD-10-PCS | Mod: S$GLB,,, | Performed by: ORTHOPAEDIC SURGERY

## 2019-07-30 PROCEDURE — 99024 POSTOP FOLLOW-UP VISIT: CPT | Mod: S$GLB,,, | Performed by: ORTHOPAEDIC SURGERY

## 2019-07-30 NOTE — PROGRESS NOTES
[facility] ORTHOPEDICS POST-OP NOTE    Subjective:           Chief Complaint:   Chief Complaint   Patient presents with    Right Knee - Post-op Evaluation     Right TKA 6.5.19. States that she is doing good. Just has soreness in the knee.        Past Medical History:   Diagnosis Date    Bipolar 1 disorder     Diabetes mellitus, type 2     Knee injury     Lumbar pain     Parkinson's disease        Past Surgical History:   Procedure Laterality Date    ABLATION      CHOLECYSTECTOMY  08/27/2018    Dr. Petersen     knee and back surgery      KNEE SURGERY      TOTAL KNEE ARTHROPLASTY Right 06/05/2019       Current Outpatient Medications   Medication Sig    atorvastatin (LIPITOR) 20 MG tablet Take 20 mg by mouth.    carbidopa-levodopa  mg (SINEMET)  mg per tablet Take 1 tablet by mouth.    ciprofloxacin HCl (CIPRO) 500 MG tablet TAKE 1 TABLET BY MOUTH TWICE DAILY    ergocalciferol (ERGOCALCIFEROL) 50,000 unit Cap Take 50,000 Units by mouth.    FLUoxetine 20 MG capsule TK 2 CS PO HS    hydrOXYzine HCl (ATARAX) 25 MG tablet Take 25 mg by mouth.    levothyroxine (SYNTHROID) 88 MCG tablet Take 88 mcg by mouth once daily.    oxyCODONE-acetaminophen (PERCOCET) 5-325 mg per tablet Take 1 tablet by mouth every 6 (six) hours as needed for Pain.    pramipexole (MIRAPEX) 0.125 MG tablet Take 0.125 mg by mouth.    traZODone (DESYREL) 150 MG tablet TK 1 T PO HS    ELIQUIS 2.5 mg Tab     gabapentin (NEURONTIN) 600 MG tablet Take 600 mg by mouth 3 (three) times daily.    hydroCHLOROthiazide (HYDRODIURIL) 25 MG tablet Take 25 mg by mouth daily as needed.    mirabegron (MYRBETRIQ) 50 mg Tb24 Take 1 tablet (50 mg total) by mouth once daily.    oxybutynin (DITROPAN-XL) 5 MG TR24 Take 1 tablet (5 mg total) by mouth once daily.     No current facility-administered medications for this visit.        Review of patient's allergies indicates:   Allergen Reactions    Morphine     Pcn [penicillins]         Family History   Problem Relation Age of Onset    Cancer Father     Diabetes Father     Hyperlipidemia Father     Cancer Paternal Aunt     Cancer Maternal Grandfather     Hyperlipidemia Maternal Grandfather     Hyperlipidemia Paternal Grandmother     Hyperlipidemia Paternal Grandfather        Social History     Socioeconomic History    Marital status:      Spouse name: Not on file    Number of children: Not on file    Years of education: Not on file    Highest education level: Not on file   Occupational History    Not on file   Social Needs    Financial resource strain: Not on file    Food insecurity:     Worry: Not on file     Inability: Not on file    Transportation needs:     Medical: Not on file     Non-medical: Not on file   Tobacco Use    Smoking status: Former Smoker     Packs/day: 1.00     Types: Cigarettes    Smokeless tobacco: Never Used   Substance and Sexual Activity    Alcohol use: No     Frequency: Never    Drug use: No    Sexual activity: Not Currently   Lifestyle    Physical activity:     Days per week: Not on file     Minutes per session: Not on file    Stress: Not on file   Relationships    Social connections:     Talks on phone: Not on file     Gets together: Not on file     Attends Yazidi service: Not on file     Active member of club or organization: Not on file     Attends meetings of clubs or organizations: Not on file     Relationship status: Not on file   Other Topics Concern    Not on file   Social History Narrative    Not on file       History of present illness: Patient comes in today for the right knee. She's doing well. She has some tightness. No numbness and tingling. No calf pain. No shortness of breath      Review of Systems:    Musculoskeletal:  See HPI      Objective:        Physical Examination:    Vital Signs:    Vitals:    07/30/19 0910   BP: 120/68   Pulse: 84       Body mass index is 32.84 kg/m².    This a well-developed, well  nourished patient in no acute distress.  They are alert and oriented and cooperative to examination.        Wounds are clean dry and intact. She's neurovascularly intact the foot. Range of motion is 0-100 degrees. Homans sign is negative. Calf is soft    . Pertinent New Results:    XRAY Report / Interpretation:   AP and lateral of the right knee demonstrates her right total knee to be in ideal position no evidence of loosening. No periprosthetic fractures. No subsidence    Assessment/Plan:      Stable following right total knee. Continue PT. Follow-up in 3 months.    This note was created using Dragon voice recognition software that occasionally misinterpreted phrases or words.

## 2019-07-31 ENCOUNTER — CLINICAL SUPPORT (OUTPATIENT)
Dept: REHABILITATION | Facility: HOSPITAL | Age: 49
End: 2019-07-31
Payer: MEDICAID

## 2019-07-31 DIAGNOSIS — G56.03 BILATERAL CARPAL TUNNEL SYNDROME: Primary | ICD-10-CM

## 2019-07-31 DIAGNOSIS — M25.561 RIGHT KNEE PAIN, UNSPECIFIED CHRONICITY: ICD-10-CM

## 2019-07-31 DIAGNOSIS — R26.9 GAIT ABNORMALITY: ICD-10-CM

## 2019-07-31 DIAGNOSIS — G56.00 CARPAL TUNNEL SYNDROME: Primary | ICD-10-CM

## 2019-07-31 PROCEDURE — 97014 ELECTRIC STIMULATION THERAPY: CPT

## 2019-07-31 PROCEDURE — 97110 THERAPEUTIC EXERCISES: CPT

## 2019-07-31 NOTE — PROGRESS NOTES
"  Physical Therapy Daily Treatment Note     Name: Sabrina Black  Clinic Number: [id number]    Therapy Diagnosis:   Encounter Diagnoses   Name Primary?    Right knee pain, unspecified chronicity     Gait abnormality      Physician: Alfredo Blanc MD    Visit Date: 7/31/2019    Physician Orders: PT EVAL AND RX  Medical Diagnosis: OA S/P R TKA  Evaluation Date: 6/10/19  Authorization Period Expiration: 8/12/19  Plan of Care Certification Period: 9/6/19  Visit #/Visits authorized: 16/ 24     Time In: 08:00  Time Out: 0:905  Total Billable Time: 64 minutes    Precautions: Standard    Subjective     Pt reports: She has been feeling better and only has a 1/10 pain this day.  .  She was compliant with home exercise program.  Response to previous treatment: cont to steadily progress  Functional change: in progress    Pain: 1/10  Location: right knee      Objective     Sabrina received therapeutic exercises to develop ROM, strength, activity andrei  for 54 minutes including:    ·  Cardio -  Nu Step: 10 minutes S:8 A:8 R:6  ·   Ankle - Gastroc stretch - slant board: 30"x3  ·   Ankle - Heel Raises: 3 sets of 15  ·   Hip - Hamstring Stretch (Seated): 3 x 30second hold  ·   Knee - Flexion - heel slide (rope): 2 sets of 15 with 10 second holds  ·   Knee - Quad Sets bolster under ankle: 3 x 15 reps with 3-second holds with 3lb AW over knee  ·   Knee - Terminal Knee Extension:  Large Bolster: 3 x15 reps 3# and 3 second holds  ·   Knee - Patellar Glides 4 ways: x 8 minutes (patellar all directions and fibula P/A and A/P)  ·   Transfers - Sit to Stand: 10 times with staggered stance/foot placement from [facility] for increased R LE use.  ·   Knee - prone knee hangs: 5 minutes /c 6# AW (not performed 7/31/19)  ·   Knee - Cybex Leg Press: 2 x 10 reps 4 plates /c 5 sec hold at the bottom for knee flexion stretch      7/12/19: R knee extension: -7*; R knee flexion: 112*  7/15/19: R knee extension: -7: R knee flexion 114*  7/24/19: Patient " ascended/descended 18 steps with HR and SC with a step to gt pattern and with VC for hand and foot placement and with a noted decrease in eccentric control of R knee.        Sabrina received the following supervised modalities after being cleared for contradictions: TENS:  Sabrina received TENS electrical stimulation for pain to the R knee for 10 minutes. Sabrina tolerated treatment well without any adverse effects.     ·  Modality - IFC E-Stim TENS with CP to R knee: 10 min         Assessment     Patient participated with Rx to address her ROM, strength activity andrei gt and pain.  She require VC/TC for exs quality and performance to improve her current deficits.  She was able to tolerate her Rx without any adverse affects and is expected to cont to progress to her goals with PT Rx.    Sabrina is progressing well towards her goals.   Pt prognosis is Good.     Pt will continue to benefit from skilled outpatient physical therapy to address the deficits listed in the problem list box on initial evaluation, provide pt/family education and to maximize pt's level of independence in the home and community environment.     Pt's spiritual, cultural and educational needs considered and pt agreeable to plan of care and goals.     Anticipated barriers to physical therapy: N/A    Goals:     Short Term Goals  Status Goal   Met 7/8/19 1. Patient will report consistently elevating her R LE and performing ankle pumps to decrease swelling within 4 visits.   Met 7/8/19 2. Patient will demonstrate ability to perform proper setup for STS from chair to improve transfer ability at home within 6 visits.   Met 7/8/19 3. Patient will be independent with initial HEP for R LE knee strength and ROM to improve therapy outcomes within 8 visits.   Long Term Goals  Status Goal   In Progress 7/31/2019    4. Patient will report R LE pain at worst as </=4/10 to improve QoL within 24 visits.   Met 7/24/19 5. Patient will improve LEFS score from 12/80 to  >/=40/80 to demonstrate an improvement in functional status within 24 visits.   In Progress 7/31/2019   6. Patient will improve R LE knee extension AROM to 0 degrees to improve ability to perform proper heel strike when ambulating within 24 visits.   In Progress 7/31/2019   7. Patient will demonstrate ability to ascend/descend 18 steps in PT gym with step through gait pattern and UE handrail assist to improve community ambulatory ability within 24 visits.   In Progress 7/31/2019   8. Patient will improve R LE knee strength to >/=4+/5 to improve ability to safely perform sit to stand transfers within 24 visits.         Plan      Patient is expected to cont to progress to her goals with PT Rx to address her ROM for flexion and extension, cont with patellar and fibular mobes.      Michael Raza, PT

## 2019-08-02 ENCOUNTER — CLINICAL SUPPORT (OUTPATIENT)
Dept: REHABILITATION | Facility: HOSPITAL | Age: 49
End: 2019-08-02
Payer: MEDICAID

## 2019-08-02 DIAGNOSIS — R26.9 GAIT ABNORMALITY: ICD-10-CM

## 2019-08-02 DIAGNOSIS — M25.561 RIGHT KNEE PAIN, UNSPECIFIED CHRONICITY: ICD-10-CM

## 2019-08-02 PROCEDURE — 97110 THERAPEUTIC EXERCISES: CPT

## 2019-08-02 PROCEDURE — 97140 MANUAL THERAPY 1/> REGIONS: CPT

## 2019-08-02 NOTE — PROGRESS NOTES
"  Physical Therapy Daily Treatment Note     Name: Sabrina Black  Clinic Number: [id number]    Therapy Diagnosis:   Encounter Diagnoses   Name Primary?    Right knee pain, unspecified chronicity     Gait abnormality      Physician: Alfredo Blanc MD    Visit Date: 8/2/2019    Physician Orders: PT EVAL AND RX  Medical Diagnosis: OA S/P R TKA  Evaluation Date: 6/10/19  Authorization Period Expiration: 8/12/19  Plan of Care Certification Period: 9/6/19  Visit #/Visits authorized: 17/ 24     Time In: 1000  Time Out: 1100  Total Billable Time: 60 minutes    Precautions: Standard    Subjective     Pt reports that she is doing good today.   She was compliant with home exercise program.  Response to previous treatment: cont to steadily progress  Functional change: nothing new to report    Pain: 1/10  Location: right knee      Objective     AAROM: Flexion 117 , -5 extension     Sabrina received therapeutic exercises to develop ROM, strength, activity andrei  For 52  minutes including:    ·  Cardio -  Nu Step: 10 minutes S:8 A:8 R:6  ·   Ankle - Gastroc stretch - slant board: 30"x3  ·   Ankle - Heel Raises: 3 sets of 15  ·   Hip - Hamstring Stretch (Seated): 3 x 30second hold   + Step ups on step with 1 riser x 10 ea LE lead   + Standing Hip Abduction 2 x 10 ea LE    + Mini squats 10x   + LAQ 15 x 3'' hold at top   ·   Knee - Flexion - heel slide (rope): 2 sets of 15 with 10 second holds   + Bridges 2 x 10    + Supine IT band stretch 2 x 30'' with strap   ·   Knee - Quad Sets bolster under ankle: 3 x 15 reps with 3-second holds with 3lb AW over knee  ·   Knee - Terminal Knee Extension:  Large Bolster: 3 x15 reps 3# and 3 second holds  ·   Transfers - Sit to Stand: 10 times with staggered stance/foot placement from [facility] for increased R LE use.  ·   Knee - prone knee hangs: 5 minutes /c 6# AW (not performed 7/31/19)  ·   Knee - Cybex Leg Press: 2 x 10 reps 4 plates /c 5 sec hold at the bottom for knee flexion stretch - NP "       7/12/19: R knee extension: -7*; R knee flexion: 112*  7/15/19: R knee extension: -7: R knee flexion 114*  7/24/19: Patient ascended/descended 18 steps with HR and SC with a step to gt pattern and with VC for hand and foot placement and with a noted decrease in eccentric control of R knee.        Manual Therapy:   Patellar Glides 4 ways: x 8 minutes (patellar all directions and fibula P/A and A/P)      Sabrina received the following supervised modalities after being cleared for contradictions: TENS:  Sabrina received TENS electrical stimulation for pain to the R knee for 0 minutes. Sabrina tolerated treatment well without any adverse effects.     ·  Modality - IFC E-Stim TENS with CP to R knee: 0 min         Assessment     Patient tolerated treatment session well today.  Patient was able to progress with strengthening and stretching with appropriate training effect achieved.  Patient displayed an increase in AAROM to 117 degrees of knee flexion.    Sabrina is progressing well towards her goals.   Pt prognosis is Good.     Pt will continue to benefit from skilled outpatient physical therapy to address the deficits listed in the problem list box on initial evaluation, provide pt/family education and to maximize pt's level of independence in the home and community environment.     Pt's spiritual, cultural and educational needs considered and pt agreeable to plan of care and goals.     Anticipated barriers to physical therapy: N/A    Goals:     Short Term Goals  Status Goal   Met 7/8/19 1. Patient will report consistently elevating her R LE and performing ankle pumps to decrease swelling within 4 visits.   Met 7/8/19 2. Patient will demonstrate ability to perform proper setup for STS from chair to improve transfer ability at home within 6 visits.   Met 7/8/19 3. Patient will be independent with initial HEP for R LE knee strength and ROM to improve therapy outcomes within 8 visits.   Long Term Goals  Status Goal   In  Progress 8/2/2019    4. Patient will report R LE pain at worst as </=4/10 to improve QoL within 24 visits.   Met 7/24/19 5. Patient will improve LEFS score from 12/80 to >/=40/80 to demonstrate an improvement in functional status within 24 visits.   In Progress 8/2/2019   6. Patient will improve R LE knee extension AROM to 0 degrees to improve ability to perform proper heel strike when ambulating within 24 visits.   In Progress 8/2/2019   7. Patient will demonstrate ability to ascend/descend 18 steps in PT gym with step through gait pattern and UE handrail assist to improve community ambulatory ability within 24 visits.   In Progress 8/2/2019   8. Patient will improve R LE knee strength to >/=4+/5 to improve ability to safely perform sit to stand transfers within 24 visits.         Plan     Continue with current POC and progress with strengthening and ROM as tolerated.     Angella Lobato, PTA

## 2019-08-05 ENCOUNTER — CLINICAL SUPPORT (OUTPATIENT)
Dept: REHABILITATION | Facility: HOSPITAL | Age: 49
End: 2019-08-05
Payer: MEDICAID

## 2019-08-05 ENCOUNTER — TELEPHONE (OUTPATIENT)
Dept: ORTHOPEDICS | Facility: CLINIC | Age: 49
End: 2019-08-05

## 2019-08-05 DIAGNOSIS — M25.561 RIGHT KNEE PAIN, UNSPECIFIED CHRONICITY: ICD-10-CM

## 2019-08-05 DIAGNOSIS — R26.9 GAIT ABNORMALITY: ICD-10-CM

## 2019-08-05 DIAGNOSIS — M25.531 RIGHT WRIST PAIN: ICD-10-CM

## 2019-08-05 DIAGNOSIS — Z96.651 S/P TOTAL KNEE REPLACEMENT, RIGHT: Primary | ICD-10-CM

## 2019-08-05 DIAGNOSIS — M25.532 LEFT WRIST PAIN: ICD-10-CM

## 2019-08-05 PROCEDURE — 97165 OT EVAL LOW COMPLEX 30 MIN: CPT

## 2019-08-05 PROCEDURE — 97110 THERAPEUTIC EXERCISES: CPT

## 2019-08-05 RX ORDER — HYDROCODONE BITARTRATE AND ACETAMINOPHEN 7.5; 325 MG/1; MG/1
1 TABLET ORAL EVERY 6 HOURS PRN
Qty: 28 TABLET | Refills: 0 | Status: SHIPPED | OUTPATIENT
Start: 2019-08-05 | End: 2019-08-12

## 2019-08-05 NOTE — PLAN OF CARE
[facility] Outpatient Occupational Therapy  Initial Evaluation     Date: 8/5/2019  Name: Sabrina Black  Clinic Number: [id number]    Therapy Diagnosis:   Encounter Diagnoses   Name Primary?    Left wrist pain     Right wrist pain      Physician: Lee Ann Daivs NP    Physician Orders: Eval and treat  Medical Diagnosis: bilateral carpal tunnel syndrome  Surgical Procedure and Date: N/A  Evaluation Date: 8/5/2019  Insurance Authorization Period Expiration: TBD  Plan of Care Certification Period: 8/05/2019-9/27/2019  Date of Return to MD: TBD    Visit # / Visits authorized: TBD  Time In:  8:00     Time Out: 8:54  Total Billable Time: 54 minutes    Precautions:  Diabetes    Subjective   Subjective:  Right worst than the left.    Awaiting schedule for EMG/NC.  Right index and thumb partially numb due to neck problems (previous surgery).  No splints.  Numbness causing her to drop things.  Hand going numb after about 5 mins of holding phone and IPad. Hands swelling 3-4 times a week    Involved Side: bilateral   Dominant Side: Right  Date of Onset: ~ 5 yrs ago with increasing symptoms of increase in frequency in the past months  History of Current Condition/Mechanism of Injury: onset gradual over time  Previous Therapy:  None for CTS    Currently receiving PT for knee replacement    Past Medical History/Physical Systems Review: -brief medical history was obtained from patient and chart review  Sabrina Black  has a past medical history of Bipolar 1 disorder, Diabetes mellitus, type 2, Knee injury, Lumbar pain, and Parkinson's disease.    Sabrina Black  has a past surgical history that includes Cholecystectomy (08/27/2018); knee and back surgery; Ablation; Knee surgery; and Total knee arthroplasty (Right, 06/05/2019).    Sabrina has a current medication list which includes the following prescription(s): atorvastatin, carbidopa-levodopa  mg, ciprofloxacin hcl, eliquis, ergocalciferol, fluoxetine,  gabapentin, hydrochlorothiazide, hydroxyzine hcl, levothyroxine, mirabegron, oxybutynin, oxycodone-acetaminophen, pramipexole, and trazodone.    Review of patient's allergies indicates:   Allergen Reactions    Morphine     Pcn [penicillins]         Patient's Goals for Therapy: to stop going numb-plans to try to obtain a part time job as an , to stop dropping things and be able to use phone and IPad without numbness    Pain:    No pain just numb  Location: both hands  Aggravating Factors: holding phone, IPad, typing  Easing Factors: massage and move them around    Occupation:     Accounting;  out of work - 4 yrs,  Plans on getting a job -part time soon    Functional Limitations/Social History:   By patient report:  Previous functional status includes: Independent self care and household tasks   Current Functional Status   ADLs/IADLs:     - Feeding:independent at times some difficulty with holding utensil    - Bathing: increase in symptoms with washing hair     - Dressing/Grooming: difficulty with fasteners with holding brush    - Household tasks:  Unable to maintain hold on handles i.e broom  and dropping dishes as washing them, unable to lift anything of weight and much difficulty opening containers                       Leisure: Needlework/Sewing-quilting- can only do a little at a time    Quick DASH:  40 disability/symptom score    Objective   Observation:  No swelling or atrophy noted throughout both hands  Palpation: mild tenderness at mid dorsal wrist  ROM:  Fingers AROM -full fist and fingers extension full bilaterally    Wrist:            Right            Left    Flexion          50               50    Extension     60               60    UD                30               30    RD                20               20    MMT:  Fingers and thumb  all flexors 5/5 and wrist flexors and extensors 5/5.     (in lbs) Trial   1            2        3           Average      Right                  40          50       40            43    Left                     34        30       35             33    Pinches (in lbs)  Ave of 2 trials                                       Right         Left          Lateral                     12             13    3 jaw beti               9               8.5    Tip                             7.5            7                          Treatment     Home Exercise Program/Education:  Patient Education: role of OT, goals for OT, scheduling/cancellations - pt verbalized understanding. Discussed insurance limitations with patient.    Additional Education provided: Discussed use of wrist cock up splints qhs and on/off during the day as needed for activities.  Patient to purchase over the counter.    Assessment     Sabrina Black is a 49 y.o. female referred to outpatient occupational therapy and presents with a medical diagnosis of bilateral carpal tunnel syndrome with increase in symptoms in past few months, resulting in numbness and decrease in strength. She reports no pain. Patient with full fingers and wrists AROM.  Her MMT of finger flexors and wrist flexors and extensors 5/5.  Her  strengths are ~ 20# less than mean for her age. Bilateral tip prehension is at the 10th percentile, 3 jaw beti 25th percentile and lateral pinches 50th and 75th percentiles (right and left respectively).  She notes mild to severe difficulty with self care and household activities.  Patient lives alone and is not working at this time.  Patient does accounting work and plans to try to return to work part time soon.   Her Quick DASH indicates same with score of 40 disability/symptom. Pt will benefit from occupational therapy services in order to maximize functional use of both hands.  The patient's rehab potential is Good.  During the evaluation, the patient required minimal modification or assistance. There is the following co-morbid conditions/personal factors may affect the plan of care: diabetes,  bipolar. Patient will benefit from OT to address the problems listed in this episode of care.    Anticipated barriers to occupational therapy: Diabetes  Pt has no cultural, educational or language barriers to learning provided.    The following goals were discussed with the patient and patient is in agreement with them as to be addressed in the treatment plan.     Goals:   Short Term Goals  Goal   Improve bilateral  and pinch strengths and decrease numbness in order for patient to be able to perform all grooming with greater ease and with AD as needed in 3 weeks   Increase bilateral  and pinch strengths decrease numbness in order for patient to be able to open screw top in 4 weeks   Reduce numbness in bilateral hands in order for patient to be able to use knife to cut with greater ease with use of AD as needed in 4 weeks   Reduce numbness and improve bilateral hand strength in order for patient to be able to carry grocery bags with greater ease in 4 weeks  Improve pinch strengths and reduce numbness in order for patient to be able to perform fasteners with greater ease and with AD as needed in 3 weeks  Patient independent in home ex program of median n glides and jt blocking of PIP and DIP flexion in 3 visits     Long Term Goals  Goal   Improve score of Quick DASH by 20 points to indicate improved functional status in 6 weeks   Increase bilateral  and pinch strengths, and decrease numbness in order for patient to be able to perform complex meal preparation and household clean up in 6 weeks   Increase right wrist ROM and bilateral  and pinch strengths and decrease pain in order for patient to be to able to reach into refrigerator and cabinets with up to 5 lbs. in 6 weeks   Increase bilateral  strengths by 8# and pinch strengths up to 5 lbs in 6 weeks  Patient reports rina to use keyboard and phone for ~ 15 with no increase in symptoms in 6 weeks         Plan   Certification Period/Plan of care  expiration: 8/5/2019 to 9/27/2019    Outpatient Occupational Therapy 2 times weekly for total of 12 visits to include the following interventions:  ADL Training (24391) - Self care/home management training , Home Exercise and Stretching, Patient Education, Therapeutic Exercise (93925) - Improve muscle strength, ROM, flexibility and muscle function, Manual Stretching (36568) - Passive or Active stretching to improve muscle length and function, Soft Tissue Mobs (18925) - Increase ROM tissue length, joint mechanics and modulate pain, Cryotherapy (42893) - Application of cold to decrease local swelling and decrease pain ,Paraffin, Heat (36318) -  Application of heat to increase local circulation and decrease pain, Ultrasound (88749) - Increase local circulation, improve tissue healing time, and modulate pain, Whirlpool/fluidotherapy (79863) - Increase local tissue circulation, improve elasticity of tissues, increased blood flow for improved muscle strength, ROM, flexibility and function, Therapeutic activities (26293) use of dynamic activities to improve functional performance, Kinesio taping.      Zaira Brewster, OT

## 2019-08-05 NOTE — TELEPHONE ENCOUNTER
----- Message from Raeann Del Real sent at 8/5/2019 11:09 AM CDT -----  Contact: patient  Pt called and asked for a refill on her pain medication, Oxycodone 5-325 mg. Last refill was on 7/23/19.    PTS # [phone]

## 2019-08-05 NOTE — PROGRESS NOTES
"  Physical Therapy Daily Treatment Note     Name: Sabrina Black  Clinic Number: [id number]    Therapy Diagnosis:   Encounter Diagnoses   Name Primary?    Right knee pain, unspecified chronicity     Gait abnormality      Physician: Alfredo Blanc MD    Visit Date: 8/5/2019    Physician Orders: PT EVAL AND RX  Medical Diagnosis: OA S/P R TKA  Evaluation Date: 6/10/19  Authorization Period Expiration: 8/12/19  Plan of Care Certification Period: 9/6/19  Visit #/Visits authorized: 18/ 24     Time In: 9:00  Time Out: 1100  Total Billable Time: 60 minutes    Precautions: Standard    Subjective     Pt reports that she is doing good today.   She was compliant with home exercise program.  Response to previous treatment: cont to steadily progress  Functional change: nothing new to report    Pain: 4/10  Location: right knee      Objective     AAROM: Flexion 117 , -5 extension     Sabrina received therapeutic exercises to develop ROM, strength, activity andrei  For 52  minutes including:    ·  Cardio -  Nu Step: 10 minutes S:8 A:8 R:6  ·   Ankle - Gastroc stretch - slant board: 30"x3  ·   Ankle - Heel Raises: 3 sets of 15  ·   Hip - Hamstring Stretch (Seated): 3 x 30second hold   + Step ups on step with 1 riser x 10 ea LE lead   + Standing Hip Abduction 3 x 10 ea LE    + Mini squats 10x   + LAQ 15 x 3'' hold at top   ·   Knee - Flexion - heel slide (rope): 2 sets of 15 with 10 second holds   + Bridges 2 x 10    + Supine IT band stretch 2 x 30'' with strap   ·   Knee - Quad Sets bolster under ankle: 3 x 15 reps with 3-second holds with 3lb AW over knee  ·   Knee - Terminal Knee Extension:  Large Bolster: 3 x15 reps 3# and 3 second holds  ·   Transfers - Sit to Stand: 10 times with staggered stance/foot placement from [facility] for increased R LE use.  ·   Knee - prone knee hangs: 5 minutes /c 6# AW (not performed 7/31/19)  ·   Knee - Cybex Leg Press: 2 x 10 reps 4 plates /c 5 sec hold at the bottom for knee flexion stretch - NP      " Knee cybex extension stretch on HS curl. 4 plates 5 minute extended stretch.       7/12/19: R knee extension: -7*; R knee flexion: 112*  7/15/19: R knee extension: -7: R knee flexion 114*  7/24/19: Patient ascended/descended 18 steps with HR and SC with a step to gt pattern and with VC for hand and foot placement and with a noted decrease in eccentric control of R knee.     8/5/19 R knee extension -6 following cybex stretch       Manual Therapy:   Patellar Glides 4 ways: x 8 minutes (patellar all directions and fibula P/A and A/P)        Assessment     Pt was able to complete all recommended there ex without significant increase of painful symptoms. Cybex stretch for improved extension added to treatment today with good results as she was able to achieve increased AROM for R knee extension following stretch. Pt will benefit from continued therapy to address ROM deficits in order to improved gait mechanics and decrease compensatory techniques.   Sabrina is progressing well towards her goals.   Pt prognosis is Good.     Pt will continue to benefit from skilled outpatient physical therapy to address the deficits listed in the problem list box on initial evaluation, provide pt/family education and to maximize pt's level of independence in the home and community environment.     Pt's spiritual, cultural and educational needs considered and pt agreeable to plan of care and goals.     Anticipated barriers to physical therapy: N/A    Goals:     Short Term Goals  Status Goal   Met 7/8/19 1. Patient will report consistently elevating her R LE and performing ankle pumps to decrease swelling within 4 visits.   Met 7/8/19 2. Patient will demonstrate ability to perform proper setup for STS from chair to improve transfer ability at home within 6 visits.   Met 7/8/19 3. Patient will be independent with initial HEP for R LE knee strength and ROM to improve therapy outcomes within 8 visits.   Long Term Goals  Status Goal   In Progress  8/2/2019    4. Patient will report R LE pain at worst as </=4/10 to improve QoL within 24 visits.   Met 7/24/19 5. Patient will improve LEFS score from 12/80 to >/=40/80 to demonstrate an improvement in functional status within 24 visits.   In Progress 8/2/2019   6. Patient will improve R LE knee extension AROM to 0 degrees to improve ability to perform proper heel strike when ambulating within 24 visits.   In Progress 8/2/2019   7. Patient will demonstrate ability to ascend/descend 18 steps in PT gym with step through gait pattern and UE handrail assist to improve community ambulatory ability within 24 visits.   In Progress 8/2/2019   8. Patient will improve R LE knee strength to >/=4+/5 to improve ability to safely perform sit to stand transfers within 24 visits.         Plan     Continue with current POC and progress with strengthening and ROM as tolerated.     Dawn Fofana, PTA

## 2019-08-07 ENCOUNTER — CLINICAL SUPPORT (OUTPATIENT)
Dept: REHABILITATION | Facility: HOSPITAL | Age: 49
End: 2019-08-07
Payer: MEDICAID

## 2019-08-07 DIAGNOSIS — R26.9 GAIT ABNORMALITY: ICD-10-CM

## 2019-08-07 DIAGNOSIS — M25.561 RIGHT KNEE PAIN, UNSPECIFIED CHRONICITY: ICD-10-CM

## 2019-08-07 PROCEDURE — 97110 THERAPEUTIC EXERCISES: CPT

## 2019-08-07 NOTE — PROGRESS NOTES
"  Physical Therapy Daily Treatment Note     Name: Sabrina Black  Clinic Number: [id number]    Therapy Diagnosis:   Encounter Diagnoses   Name Primary?    Right knee pain, unspecified chronicity     Gait abnormality      Physician: Alfredo Blanc MD    Visit Date: 8/7/2019    Physician Orders: PT EVAL AND RX  Medical Diagnosis: OA S/P R TKA  Evaluation Date: 6/10/19  Authorization Period Expiration: 8/12/19  Plan of Care Certification Period: 9/6/19  Visit #/Visits authorized: 18/ 24     Time In: 8:00  Time Out: 0910  Total Billable Time: 70 minutes    Precautions: Standard    Subjective     Pt reports no changes on todays date and is without new complaints.  She was compliant with home exercise program.  Response to previous treatment: cont to steadily progress  Functional change: nothing new to report    Pain: 4/10  Location: right knee      Objective     AAROM: Flexion 117 , -5 extension     Sabrina received therapeutic exercises to develop ROM, strength, activity andrei  For 52  minutes including:    ·  Cardio -  Recumbent Bike 10 minutes S:8 A:8 R:6  ·   Ankle - Gastroc stretch - slant board: 30"x3  ·   Ankle - Heel Raises: 3 sets of 15  ·   Hip - Hamstring Stretch (Seated): 3 x 30second hold    Step ups on step with 1 riser x 10 ea LE lead    Standing Hip Abduction 3 x 10 ea LE     Mini squats 2x10 reps    LAQ 20 x 5'' hold at top   ·   Knee - Flexion - heel slide (rope): 2 sets of 15 with 10 second holds   + Bridges 3 x 10      Supine IT band stretch 2 x 30'' with strap   ·   Knee - SLR bolster under ankle: 3 x 10 reps with 3-second holds with 3lb AW over knee on RLE  ·   Knee - Terminal Knee Extension:  Large Bolster: 3 x15 reps 3# and 3 second holds  ·   Transfers - Sit to Stand: 10 times with staggered stance/foot placement from plint for increased R LE use. DNP  ·   Knee - prone knee hangs: 5 minutes /c 6# AW   ·   Knee - Cybex Leg Press: 2 x 10 reps 4 plates /c 5 sec hold at the bottom for knee flexion " stretch - NP      Knee cybex extension stretch on HS curl. 4 plates 5 minute extended stretch. /c CP      7/12/19: R knee extension: -7*; R knee flexion: 112*  7/15/19: R knee extension: -7: R knee flexion 114*  7/24/19: Patient ascended/descended 18 steps with HR and SC with a step to gt pattern and with VC for hand and foot placement and with a noted decrease in eccentric control of R knee.     8/5/19 R knee extension -6 following cybex stretch       Manual Therapy:   Patellar Glides 4 ways: x 8 minutes (patellar all directions and fibula P/A and A/P) (DNP)        Assessment     Pt tolerated the addition of SLR there-ex while simultaneously working on R knee extension well without onset of incident. She also tolerated an increase in several reps and sets as indicated above. Pt will continue to benefit from skilled PT to improve R knee ROM to allow pt to ambulate through all phases of the gait cycle without compensations.      Sabrina is progressing well towards her goals.   Pt prognosis is Good.     Pt will continue to benefit from skilled outpatient physical therapy to address the deficits listed in the problem list box on initial evaluation, provide pt/family education and to maximize pt's level of independence in the home and community environment.     Pt's spiritual, cultural and educational needs considered and pt agreeable to plan of care and goals.     Anticipated barriers to physical therapy: N/A    Goals:     Short Term Goals  Status Goal   Met 7/8/19 1. Patient will report consistently elevating her R LE and performing ankle pumps to decrease swelling within 4 visits.   Met 7/8/19 2. Patient will demonstrate ability to perform proper setup for STS from chair to improve transfer ability at home within 6 visits.   Met 7/8/19 3. Patient will be independent with initial HEP for R LE knee strength and ROM to improve therapy outcomes within 8 visits.   Long Term Goals  Status Goal   In Progress 8/7/2019      4.  Patient will report R LE pain at worst as </=4/10 to improve QoL within 24 visits.   Met 7/24/19 5. Patient will improve LEFS score from 12/80 to >/=40/80 to demonstrate an improvement in functional status within 24 visits.   In Progress 8/7/2019     6. Patient will improve R LE knee extension AROM to 0 degrees to improve ability to perform proper heel strike when ambulating within 24 visits.   In Progress 8/7/2019     7. Patient will demonstrate ability to ascend/descend 18 steps in PT gym with step through gait pattern and UE handrail assist to improve community ambulatory ability within 24 visits.   In Progress 8/7/2019     8. Patient will improve R LE knee strength to >/=4+/5 to improve ability to safely perform sit to stand transfers within 24 visits.         Plan     Continue with current POC and progress with strengthening and ROM as tolerated.     Heather Molina, PTA

## 2019-08-12 ENCOUNTER — CLINICAL SUPPORT (OUTPATIENT)
Dept: REHABILITATION | Facility: HOSPITAL | Age: 49
End: 2019-08-12
Payer: MEDICAID

## 2019-08-12 DIAGNOSIS — M25.561 RIGHT KNEE PAIN, UNSPECIFIED CHRONICITY: ICD-10-CM

## 2019-08-12 DIAGNOSIS — R26.9 GAIT ABNORMALITY: ICD-10-CM

## 2019-08-12 PROCEDURE — 97110 THERAPEUTIC EXERCISES: CPT

## 2019-08-12 NOTE — PROGRESS NOTES
"  Physical Therapy Daily Treatment Note     Name: Sabrina Black  Clinic Number: [id number]    Therapy Diagnosis:   Encounter Diagnoses   Name Primary?    Right knee pain, unspecified chronicity     Gait abnormality      Physician: Alfredo Blanc MD    Visit Date: 8/12/2019    Physician Orders: PT EVAL AND RX  Medical Diagnosis: OA S/P R TKA  Evaluation Date: 6/10/19  Authorization Period Expiration: 8/12/19  Plan of Care Certification Period: 9/6/19 (Progress Note due 8/24/19)  Visit #/Visits authorized: 5/10    Time In: 8:00  Time Out: 0900  Total Billable Time: 30 minutes    Precautions: Standard    Subjective     Pt reports She went to [employer] without her cane stating, "My knee didn't hurt nearly as bad."   She was compliant with home exercise program.  Response to previous treatment: cont to steadily progress  Functional change: Reports improvement in ADL's.     Pain: 5/10  Location: right knee      Objective     AAROM: Flexion 117 , -5 extension     Sabrina received therapeutic exercises to develop ROM, strength, activity andrei  For 52  minutes including:    ·  Cardio -  Recumbent Bike 10 minutes S:8 A:8 R:6  ·   Ankle - Gastroc stretch - slant board: 30"x3  ·   Ankle - Heel Raises: 3 sets of 15  ·   Hip - Hamstring Stretch (Seated): 3 x 30second hold    Step ups on step with 1 riser x 10 ea LE lead    Standing Hip Abduction 3 x 10 ea LE     Mini squats 2x10 reps    LAQ 20 x 5'' hold at top   ·   Knee - Flexion - heel slide (rope): 2 sets of 15 with 10 second holds    Bridges 3 x 10 NP     Supine IT band stretch 2 x 30'' with strap   ·   Knee - SLR bolster under ankle: 3 x 10 reps with 3-second holds with 3lb AW over knee on RLE  ·   Knee - Terminal Knee Extension:  Large Bolster: 3 x15 reps 3# and 3 second holds  ·   Transfers - Sit to Stand: 10 times with staggered stance/foot placement from [facility] for increased R LE use. NP  ·   Knee - prone knee hangs: 5 minutes /c 6# AW NP  ·   Knee - Cybex " Leg Press: 2 x 10 reps 4 plates /c 5 sec hold at the bottom for knee flexion stretch - NP      Knee cybex extension stretch on HS curl. 4 plates 5 minute extended stretch. /c CP      7/12/19: R knee extension: -7*; R knee flexion: 112*  7/15/19: R knee extension: -7: R knee flexion 114*  7/24/19: Patient ascended/descended 18 steps with HR and SC with a step to gt pattern and with VC for hand and foot placement and with a noted decrease in eccentric control of R knee.     8/5/19 R knee extension -6 following cybex stretch       Manual Therapy:   Patellar Glides 4 ways: x 8 minutes (patellar all directions and fibula P/A and A/P) (NP)        Assessment     Pt req'd min cueing to improve execution and form of mini squats well as she displayed improvement. Pt still visibly lacks knee extension as evident by inability to achieve full knee extension during SLR. Pt will continue to benefit from skilled PT to improve R knee ROM to allow pt to return to functional ADL's without pain and limitations.   Sabrina is progressing well towards her goals.   Pt prognosis is Good.     Pt will continue to benefit from skilled outpatient physical therapy to address the deficits listed in the problem list box on initial evaluation, provide pt/family education and to maximize pt's level of independence in the home and community environment.     Pt's spiritual, cultural and educational needs considered and pt agreeable to plan of care and goals.     Anticipated barriers to physical therapy: N/A    Goals:     Short Term Goals  Status Goal   Met 7/8/19 1. Patient will report consistently elevating her R LE and performing ankle pumps to decrease swelling within 4 visits.   Met 7/8/19 2. Patient will demonstrate ability to perform proper setup for STS from chair to improve transfer ability at home within 6 visits.   Met 7/8/19 3. Patient will be independent with initial HEP for R LE knee strength and ROM to improve therapy outcomes within 8  visits.   Long Term Goals  Status Goal   In Progress 8/12/2019      4. Patient will report R LE pain at worst as </=4/10 to improve QoL within 24 visits.   Met 7/24/19 5. Patient will improve LEFS score from 12/80 to >/=40/80 to demonstrate an improvement in functional status within 24 visits.   In Progress 8/12/2019     6. Patient will improve R LE knee extension AROM to 0 degrees to improve ability to perform proper heel strike when ambulating within 24 visits.   In Progress 8/12/2019     7. Patient will demonstrate ability to ascend/descend 18 steps in PT gym with step through gait pattern and UE handrail assist to improve community ambulatory ability within 24 visits.   In Progress 8/12/2019     8. Patient will improve R LE knee strength to >/=4+/5 to improve ability to safely perform sit to stand transfers within 24 visits.         Plan     Continue with current POC and progress with strengthening and ROM as tolerated.     Heather Molina, PTA

## 2019-08-14 ENCOUNTER — CLINICAL SUPPORT (OUTPATIENT)
Dept: REHABILITATION | Facility: HOSPITAL | Age: 49
End: 2019-08-14
Payer: MEDICAID

## 2019-08-14 DIAGNOSIS — M25.532 LEFT WRIST PAIN: ICD-10-CM

## 2019-08-14 DIAGNOSIS — M25.561 RIGHT KNEE PAIN, UNSPECIFIED CHRONICITY: Primary | ICD-10-CM

## 2019-08-14 DIAGNOSIS — R26.9 GAIT ABNORMALITY: ICD-10-CM

## 2019-08-14 DIAGNOSIS — M25.531 RIGHT WRIST PAIN: ICD-10-CM

## 2019-08-14 PROCEDURE — 97530 THERAPEUTIC ACTIVITIES: CPT

## 2019-08-14 PROCEDURE — 97110 THERAPEUTIC EXERCISES: CPT

## 2019-08-14 NOTE — PROGRESS NOTES
[facility] Outpatient Occupational Therapy Daily Treatment Note       Date: 8/14/2019  Name: Sabrina Black  Clinic Number: [id number]    Therapy Diagnosis:   Encounter Diagnoses   Name Primary?    Left wrist pain     Right wrist pain      Physician: Lee Ann Davis NP    Physician Orders: Eval and treat  Medical Diagnosis: bilateral carpal tunnel syndrome  Surgical Procedure and Date: N/A  Eval Date: 8/05/2019   Insurance Authorization Period Expiration: 8/07/2019-6/27/2019  Plan of Care Certification Period: 8/05/2019-9/27/2019    Date of Return to MD: TBD    Visit # / Visits authorized:1/12  Time In:9:00  Time Out: 10:00  Total Billable Time: 54 minutes    Precautions:  Standard, diabetes    Subjective     Pt reports: Not having any pain or numbness now.  Noting soreness in left by end of session.   Stretching and discomfort with at volar forearms with median n glides. Patient reported hasn't purchased splints due to finances.     Pain: 2/10 left as leaving; no pain prior to session  Location: LEFT hand/wrist    Objective    Patient received the following treatment:       Therapeutic activities to improve functional performance with use of dynamic activities for 54 mins  Including:  Ultrasound  for pain control and to increase tissue elasticity @ 50 duty cycle, 3 Mhz, applied to bilateral carpal tunnel areas, intensity =0.8 w/cm2 for 8 minutes each hand  Soft tissue massage to Carpal tunnel area and stretches to transverse carpal ligaments to increase tissue elasticity and decrease pain  Joint blocking of PIPs and DIPs flexion each 10 reps to glide through tunnel  Theraputty  RED gripping  Median n glides both hands x 10 reps each-some difficulty with maintaining wrist ext as supinating forearms  Patient required moderate cuing for correct performance of ex    Home Exercises and Education Provided     Written Home Exercises Provided: Instructed and demonstrated median n glides to be continues  each hand 10 reps 2 times a day with good return demonstration with no assistance by end of session.    Assessment   Patient reporting no pain or numbness at time arrived for session.  Patient with increase in symptoms at left hand to a 2/10 by end of session.  Patient able to perform most ex with minimal increase in symptoms. Pt independent in performing home program of jo ann cazares.      Plan   Continue skilled therapy 2 times a week for 11 visits with ther ex and activities, manual therapy, and pain modalities as needed       HALIE Bueno

## 2019-08-14 NOTE — PROGRESS NOTES
"  Physical Therapy Daily Treatment Note     Name: Sabrina Black  Clinic Number: [id number]    Therapy Diagnosis:   Encounter Diagnoses   Name Primary?    Right knee pain, unspecified chronicity     Gait abnormality      Physician: Alfredo Blanc MD    Visit Date: 8/14/2019    Physician Orders: PT EVAL AND RX  Medical Diagnosis: OA S/P R TKA  Evaluation Date: 6/10/19  Authorization Period Expiration: 8/12/19  Plan of Care Certification Period: 9/6/19 (Progress Note due 8/24/19)  Visit #/Visits authorized: 6/10    Time In: 8:00  Time Out: 08:58  Total Billable Time: 56 minutes    Precautions: Standard    Subjective     Pt reports she woke up a little late this AM and currently with a 3/10 pain to her R knee.      Response to previous treatment: cont to steadily progress  Functional change: Reports improvement in ADL's.     Pain: 3/10  Location: right knee      Objective     Sabrina received therapeutic exercises to develop ROM, strength, activity andrei  For 56 minutes including:    ·  Cardio -  Recumbent Bike 10 minutes R:4  ·   Ankle - Gastroc stretch - slant board: 30"x3     Hip - Hamstring Stretch (Seated): 3 x 30second hold    Supine IT band stretch 2 x 30'' with strap (DNP)  ·   Ankle - Heel Raises: 3 sets of 15    Step ups on step with 1 riser x 10 ea LE lead    Standing Hip Abduction 3 x 10 ea LE     Mini squats 2x10 reps    LAQ 20 x 5'' hold at top   ·   Knee - Flexion - heel slide (rope): 2 sets of 15 with 10 second holds (one set this day)  ·   Knee - SLR bolster under ankle: 3 x 10 reps with 3-second holds with 3lb AW over knee on RLE  ·   Knee - Terminal Knee Extension:  Large Bolster: 3 x15 reps 3# and 3 second holds  ·   Transfers - Sit to Stand: 10 times with staggered stance/foot placement from [facility] for increased R LE use.   ·   Knee - prone knee hangs: 5 minutes /c 6# AW NP  ·   Knee - Cybex Leg Press: 2 x 10 reps 4 plates /c 5 sec hold at the bottom for knee flexion stretch - NP      Knee cybex " extension stretch on HS curl. 4 plates 5 minute extended stretch. /c CP (DNP patient had OT)      7/12/19: R knee extension: -7*; R knee flexion: 112*  7/15/19: R knee extension: -7: R knee flexion 114*  7/24/19: Patient ascended/descended 18 steps with HR and SC with a step to gt pattern and with VC for hand and foot placement and with a noted decrease in eccentric control of R knee.     8/5/19 R knee extension -6 following cybex stretch       Manual Therapy:   Patellar Glides 4 ways: x 8 minutes (patellar all directions and fibula P/A and A/P) (NP)        Assessment     Patient has participated with Rx to address her ROM, strength, activity andrei in order to promote improved gt quality and to mitigate pain.  She required VC/TC for improved quality and performance with TE in order to improve ROM strength and functional mobility.  Patient was able to tolerate Rx with no new c/o.  Additionally, she continues with restrictions surrounding her patellar and superior Tib/fib, and will benefit from cont mobes.  Patient is expected to progress towards goals with cont Rx in order to optimize her functional mob.      Sabrina is progressing well towards her goals.   Pt prognosis is Good.     Pt will continue to benefit from skilled outpatient physical therapy to address the deficits listed in the problem list box on initial evaluation, provide pt/family education and to maximize pt's level of independence in the home and community environment.     Pt's spiritual, cultural and educational needs considered and pt agreeable to plan of care and goals.     Anticipated barriers to physical therapy: N/A    Goals:     Short Term Goals  Status Goal   Met 7/8/19 1. Patient will report consistently elevating her R LE and performing ankle pumps to decrease swelling within 4 visits.   Met 7/8/19 2. Patient will demonstrate ability to perform proper setup for STS from chair to improve transfer ability at home within 6 visits.   Met 7/8/19 3.  Patient will be independent with initial HEP for R LE knee strength and ROM to improve therapy outcomes within 8 visits.   Long Term Goals  Status Goal   In Progress 8/14/2019      4. Patient will report R LE pain at worst as </=4/10 to improve QoL within 24 visits.   Met 7/24/19 5. Patient will improve LEFS score from 12/80 to >/=40/80 to demonstrate an improvement in functional status within 24 visits.   In Progress 8/14/2019     6. Patient will improve R LE knee extension AROM to 0 degrees to improve ability to perform proper heel strike when ambulating within 24 visits.   In Progress 8/14/2019     7. Patient will demonstrate ability to ascend/descend 18 steps in PT gym with step through gait pattern and UE handrail assist to improve community ambulatory ability within 24 visits.   In Progress 8/14/2019     8. Patient will improve R LE knee strength to >/=4+/5 to improve ability to safely perform sit to stand transfers within 24 visits.         Plan     Continue with current POC and progress with strengthening and ROM as tolerated.     Michael Raza PT

## 2019-08-15 NOTE — PROGRESS NOTES
[facility] Outpatient Occupational Therapy Daily Treatment Note       Date: 8/16/2019  Name: Sabrina Black  Clinic Number: [id number]    Therapy Diagnosis:   No diagnosis found.  Physician: Lee Ann Davis NP    Physician Orders: Eval and treat  Medical Diagnosis: bilateral carpal tunnel syndrome  Surgical Procedure and Date: N/A  Eval Date: 8/05/2019   Insurance Authorization Period Expiration: 8/07/2019-6/27/2019  Plan of Care Certification Period: 8/05/2019-9/27/2019    Date of Return to MD: TBD    Visit # / Visits authorized:2/12  Time In:9:00  Time Out: 10:00  Total Billable Time: 33 minutes    Precautions:  Standard, diabetes    Subjective     Pt reports:  Patient reports performing home program of nerve glides with no increase in symptoms.  Patient noted tingling along right dorsal aspect of forearm into hand upon entering clinic, tingling gone (at end of session).    Pain: 3/10 end of session  Location:  Right hand/wrist    Objective    Patient received the following treatment:       Therapeutic activities to improve functional performance with use of dynamic activities for 33 mins  Including:    NP=NOT PERFORMED  Ultrasound  for pain control and to increase tissue elasticity @ 50 duty cycle, 3 Mhz, applied to bilateral carpal tunnel areas, intensity =0.8 w/cm2 for 8 minutes each hand  Soft tissue massage to Carpal tunnel area and stretches to transverse carpal ligaments to increase tissue elasticity and decrease pain  ADDED: Free weight: wrist 1# flexion, extension and deviations 20 each R and L  Theraputty  RED gripping and pinching  R and L  Median n glides both hands x 10 reps each with therapist assist to maintain wrist ext as supinated  Patient required moderate cuing for correct performance of ex  NP-Joint blocking of PIPs and DIPs flexion each 10 reps to glide through tunnel    Assessment   Patient reporting having a little more discomfort this morning in the right.  Patient  performing added resistive ex with no increase in symptoms.  Tingling patient noted at start of session doesn't appear related to median n and was resolved by end of session.    Plan   Continue skilled therapy 2 times a week for 11 visits with ther ex and activities, manual therapy, and pain modalities as needed       HALIE Bueno

## 2019-08-16 ENCOUNTER — CLINICAL SUPPORT (OUTPATIENT)
Dept: REHABILITATION | Facility: HOSPITAL | Age: 49
End: 2019-08-16
Payer: MEDICAID

## 2019-08-16 DIAGNOSIS — M25.561 RIGHT KNEE PAIN, UNSPECIFIED CHRONICITY: ICD-10-CM

## 2019-08-16 DIAGNOSIS — M25.531 RIGHT WRIST PAIN: ICD-10-CM

## 2019-08-16 DIAGNOSIS — M25.532 LEFT WRIST PAIN: ICD-10-CM

## 2019-08-16 DIAGNOSIS — R26.9 GAIT ABNORMALITY: ICD-10-CM

## 2019-08-16 PROCEDURE — 97110 THERAPEUTIC EXERCISES: CPT

## 2019-08-16 PROCEDURE — 97530 THERAPEUTIC ACTIVITIES: CPT

## 2019-08-16 NOTE — PROGRESS NOTES
"  Physical Therapy Daily Treatment Note     Name: Sabrina Black  Clinic Number: [id number]    Therapy Diagnosis:   Encounter Diagnoses   Name Primary?    Right knee pain, unspecified chronicity     Gait abnormality      Physician: Alfredo Blanc MD    Visit Date: 8/16/2019    Physician Orders: PT EVAL AND RX  Medical Diagnosis: OA S/P R TKA  Evaluation Date: 6/10/19  Authorization Period Expiration: 8/12/19  Plan of Care Certification Period: 9/6/19 (Progress Note due 8/24/19)  Visit #/Visits authorized: 7/10    Time In: 8:00  Time Out: 0900  Total Billable Time: 30 minutes    Precautions: Standard    Subjective     Pt reports that she is doing good today.     Response to previous treatment: good   Functional change: Reports improvement in ADL's.     Pain: 3/10  Location: right knee      Objective     Sabrina received therapeutic exercises to develop ROM, strength, activity andrei  For 56 minutes including:    ·  Cardio -  Recumbent Bike 10 minutes R:4  ·   Ankle - Gastroc stretch - slant board: 30"x3     Hip - Hamstring Stretch (Seated): 3 x 30second hold     ·   Ankle - Heel Raises: 3 sets of 15    Step ups on step with 1 riser x 10 ea LE lead    Standing Hip Abduction 3 x 10 ea LE     Mini squats 2x10 reps    LAQ 20 x 5'' hold at top   ·   Knee - Flexion - heel slide (rope): 2 sets of 15 with 10 second holds   ·   Knee - SLR bolster under ankle: 3 x 10 reps with 3-second holds with 3lb AW over knee on RLE  ·   Knee - Terminal Knee Extension:  Large Bolster: 3 x15 reps 3# and 3 second holds  ·   Transfers - Sit to Stand: 10 times with staggered stance/foot placement from [facility] for increased R LE use.   ·   Knee - prone knee hangs: 5 minutes /c 6# AW NP  ·   Knee - Cybex Leg Press: 2 x 10 reps 4 plates /c 5 sec hold at the bottom for knee flexion stretch      Knee cybex extension stretch on HS curl. 4 plates 5 minute extended stretch. /c CP       7/12/19: R knee extension: -7*; R knee flexion: 112*  7/15/19: R " knee extension: -7: R knee flexion 114*  7/24/19: Patient ascended/descended 18 steps with HR and SC with a step to gt pattern and with VC for hand and foot placement and with a noted decrease in eccentric control of R knee.     8/5/19 R knee extension -6 following cybex stretch       Manual Therapy:   Patellar Glides 4 ways: x 8 minutes (patellar all directions )         Assessment     Patient tolerated treatment session well.  Patient with good tolerance to exercises with appropriate training effect achieved. Patient was able to progress with strengthening this session with appropriate training effect achieved.  Patient was able to achieve 120 degrees of AAROM knee flexion today.     Sabrina is progressing well towards her goals.   Pt prognosis is Good.     Pt will continue to benefit from skilled outpatient physical therapy to address the deficits listed in the problem list box on initial evaluation, provide pt/family education and to maximize pt's level of independence in the home and community environment.     Pt's spiritual, cultural and educational needs considered and pt agreeable to plan of care and goals.     Anticipated barriers to physical therapy: N/A    Goals:     Short Term Goals  Status Goal   Met 7/8/19 1. Patient will report consistently elevating her R LE and performing ankle pumps to decrease swelling within 4 visits.   Met 7/8/19 2. Patient will demonstrate ability to perform proper setup for STS from chair to improve transfer ability at home within 6 visits.   Met 7/8/19 3. Patient will be independent with initial HEP for R LE knee strength and ROM to improve therapy outcomes within 8 visits.   Long Term Goals  Status Goal   In Progress 8/16/2019      4. Patient will report R LE pain at worst as </=4/10 to improve QoL within 24 visits.   Met 7/24/19 5. Patient will improve LEFS score from 12/80 to >/=40/80 to demonstrate an improvement in functional status within 24 visits.   In Progress  8/16/2019     6. Patient will improve R LE knee extension AROM to 0 degrees to improve ability to perform proper heel strike when ambulating within 24 visits.   In Progress 8/16/2019     7. Patient will demonstrate ability to ascend/descend 18 steps in PT gym with step through gait pattern and UE handrail assist to improve community ambulatory ability within 24 visits.   In Progress 8/16/2019     8. Patient will improve R LE knee strength to >/=4+/5 to improve ability to safely perform sit to stand transfers within 24 visits.         Plan     Continue with current POC and progress with strengthening and ROM as tolerated.

## 2019-08-19 ENCOUNTER — CLINICAL SUPPORT (OUTPATIENT)
Dept: REHABILITATION | Facility: HOSPITAL | Age: 49
End: 2019-08-19
Attending: ORTHOPAEDIC SURGERY
Payer: MEDICAID

## 2019-08-19 ENCOUNTER — CLINICAL SUPPORT (OUTPATIENT)
Dept: REHABILITATION | Facility: HOSPITAL | Age: 49
End: 2019-08-19
Payer: MEDICAID

## 2019-08-19 ENCOUNTER — TELEPHONE (OUTPATIENT)
Dept: ORTHOPEDICS | Facility: CLINIC | Age: 49
End: 2019-08-19

## 2019-08-19 DIAGNOSIS — M25.561 RIGHT KNEE PAIN, UNSPECIFIED CHRONICITY: ICD-10-CM

## 2019-08-19 DIAGNOSIS — Z96.651 S/P TOTAL KNEE REPLACEMENT, RIGHT: Primary | ICD-10-CM

## 2019-08-19 DIAGNOSIS — M25.531 RIGHT WRIST PAIN: ICD-10-CM

## 2019-08-19 DIAGNOSIS — M25.532 LEFT WRIST PAIN: ICD-10-CM

## 2019-08-19 DIAGNOSIS — R26.9 GAIT ABNORMALITY: ICD-10-CM

## 2019-08-19 PROCEDURE — 97110 THERAPEUTIC EXERCISES: CPT

## 2019-08-19 PROCEDURE — 97530 THERAPEUTIC ACTIVITIES: CPT

## 2019-08-19 RX ORDER — HYDROCODONE BITARTRATE AND ACETAMINOPHEN 7.5; 325 MG/1; MG/1
1 TABLET ORAL EVERY 6 HOURS PRN
Qty: 28 TABLET | Refills: 0 | Status: SHIPPED | OUTPATIENT
Start: 2019-08-19 | End: 2019-10-31

## 2019-08-19 NOTE — TELEPHONE ENCOUNTER
----- Message from RT Mel sent at 8/19/2019 11:27 AM CDT -----  Contact: patient      ----- Message -----  From: Nisreen Castellano  Sent: 8/19/2019   8:29 AM  To: Maryellen Friedman LPN    Needs a refill on hydrocodone norco [phone], call patient at [phone].

## 2019-08-19 NOTE — PROGRESS NOTES
"               [facility] Outpatient Occupational Therapy Daily Treatment Note       Date: 8/19/2019  Name: Sabrina Black  Clinic Number: [id number]    Therapy Diagnosis:   Encounter Diagnoses   Name Primary?    Left wrist pain     Right wrist pain      Physician: Alfredo Blanc MD    Physician Orders: Eval and treat  Medical Diagnosis: bilateral carpal tunnel syndrome  Surgical Procedure and Date: N/A  Eval Date: 8/05/2019   Insurance Authorization Period Expiration: 8/07/2019-9/27/2019  Plan of Care Certification Period: 8/05/2019-9/27/2019    Date of Return to MD: TBD    Visit # / Visits authorized:3/12  Time In:8:00  Time Out: 9:00  Total Billable Time: 53 minutes    Precautions:  Standard, diabetes    Subjective     Pt reports:  Patient reports "Keep going numb here (indicating along right dorsal radial forearm into thumb and index and volar aspect of both fingers.  No problem with the left one this morning."     Objective    Patient received the following treatment:       Therapeutic activities to improve functional performance with use of dynamic activities for 53 mins  Including:    NP=NOT PERFORMED  Ultrasound  for pain control and to increase tissue elasticity @ 50 duty cycle, 3 Mhz, applied to right carpal tunnel area, intensity =0.8 w/cm2 for 8 minutes right hand  Soft tissue massage to Carpal tunnel area and stretches to transverse carpal ligaments ADDED: Massage and stretch of forearm flexors right and left  to increase tissue elasticity and decrease pain  Joint blocking of PIPs and DIPs flexion each 10 reps to glide through tunnel each hand  Free weight: wrist 1# flexion, extension and deviations 20 each R and L  Theraputty  RED gripping and pinching  R and L  Median n glides both hands x 10 reps each with therapist assist to maintain wrist ext as supinated    Patient required moderate cuing for correct performance of ex    Assessment   As entered clinic noting tingling along radial aspect " "of forearm into thumb and index.  Patient reporting with increase in tingling at bilateral long and ring fingers with massage and stretch of forearm flexors.  Soreness also noted at left forearm with massage. Patient with mm soreness at left forearm and noting tingling "intense" at right and "there" at left hand post session.    Plan   Continue skilled therapy 2 times a week with ther ex and activities, manual therapy, and pain modalities as needed.       HALIE Bueno  "

## 2019-08-19 NOTE — PROGRESS NOTES
"  Physical Therapy Daily Treatment Note     Name: Sabrina Black  Clinic Number: [id number]    Therapy Diagnosis:   Encounter Diagnoses   Name Primary?    Right knee pain, unspecified chronicity     Gait abnormality      Physician: Alfredo Blanc MD    Visit Date: 8/19/2019    Physician Orders: PT EVAL AND RX  Medical Diagnosis: OA S/P R TKA  Evaluation Date: 6/10/19  Authorization Period Expiration: 8/12/19  Plan of Care Certification Period: 9/6/19 (Progress Note due 8/24/19)  Visit #/Visits authorized: 8/10  PTA visit: 2/5    Time In: 0900  Time Out: 1000  Total Billable Time: 60 minutes    Precautions: Standard    Subjective     Pt reports no changes    Response to previous treatment: good   Functional change: Reports improvement in ADL's.     Pain: 3/10  Location: right knee      Objective     Sabrina received therapeutic exercises to develop ROM, strength, activity andrei  For 56 minutes including:    ·  Cardio -  Recumbent Bike 10 minutes R:4  ·   Ankle - Gastroc stretch - slant board: 30"x3     Hip - Hamstring Stretch (Seated): 3 x 30second hold     Ankle - Heel Raises: 3 sets of 15    Step ups on step with 1 riser x 10 ea LE lead    Standing Hip Abduction 3 x 10 ea LE     Mini squats 3x10 reps    LAQ 30 x 5'' hold at top 3# AW  ·   Knee - Flexion - heel slide (rope): 2 sets of 15 with 10 second holds   ·   Knee - SLR bolster under ankle: 3 x 10 reps with 3-second holds with 3lb AW over knee on RLE; 2# AW on RLE during AROM  ·   Knee - Terminal Knee Extension:  Large Bolster: 3 x15 reps 3# and 3 second holds  ·   Transfers - Sit to Stand: 15 times with staggered stance/foot placement from [facility] for increased R LE use.   ·   Knee - prone knee hangs: 5 minutes /c 6# AW   ·   Knee - Cybex Leg Press: 2 x 10 reps 4 plates /c 5 sec hold at the bottom for knee flexion stretch      Knee cybex extension stretch on HS curl. 4 plates 5 minute extended stretch. /c CP       7/12/19: R knee extension: -7*; R knee flexion: " 112*  7/15/19: R knee extension: -7: R knee flexion 114*  7/24/19: Patient ascended/descended 18 steps with HR and SC with a step to gt pattern and with VC for hand and foot placement and with a noted decrease in eccentric control of R knee.     8/5/19 R knee extension -6 following cybex stretch   8/19/19: R knee flexion measured in sitting 118* extension not measured on 8/19/2019         Manual Therapy:   Patellar Glides 4 ways: x 8 minutes (patellar all directions )         Assessment     Pt tolerated increasing resistance and reps as indicated in bold above without onset of incident. She req's cueing to improve quad activiation /c SLR's on RLE. Pt demo'd good response to cueing /c improvement noted. Pt will continue to benefit from skilled PT to improve R knee extension as she continues to visibly lack full extension which effects her gait mechanics and long term could cause mm imbalances.     Sabrina is progressing well towards her goals.   Pt prognosis is Good.     Pt will continue to benefit from skilled outpatient physical therapy to address the deficits listed in the problem list box on initial evaluation, provide pt/family education and to maximize pt's level of independence in the home and community environment.     Pt's spiritual, cultural and educational needs considered and pt agreeable to plan of care and goals.     Anticipated barriers to physical therapy: N/A    Goals:     Short Term Goals  Status Goal   Met 7/8/19 1. Patient will report consistently elevating her R LE and performing ankle pumps to decrease swelling within 4 visits.   Met 7/8/19 2. Patient will demonstrate ability to perform proper setup for STS from chair to improve transfer ability at home within 6 visits.   Met 7/8/19 3. Patient will be independent with initial HEP for R LE knee strength and ROM to improve therapy outcomes within 8 visits.   Long Term Goals  Status Goal   In Progress 8/19/2019      4. Patient will report R LE pain  at worst as </=4/10 to improve QoL within 24 visits.   Met 7/24/19 5. Patient will improve LEFS score from 12/80 to >/=40/80 to demonstrate an improvement in functional status within 24 visits.   In Progress 8/19/2019     6. Patient will improve R LE knee extension AROM to 0 degrees to improve ability to perform proper heel strike when ambulating within 24 visits.   In Progress 8/19/2019     7. Patient will demonstrate ability to ascend/descend 18 steps in PT gym with step through gait pattern and UE handrail assist to improve community ambulatory ability within 24 visits.   In Progress 8/19/2019     8. Patient will improve R LE knee strength to >/=4+/5 to improve ability to safely perform sit to stand transfers within 24 visits.         Plan     Continue with current POC and progress with strengthening and ROM as tolerated.         Heather Molina, PTA

## 2019-08-20 ENCOUNTER — TELEPHONE (OUTPATIENT)
Dept: ORTHOPEDICS | Facility: CLINIC | Age: 49
End: 2019-08-20

## 2019-08-20 NOTE — TELEPHONE ENCOUNTER
----- Message from Aleshia Anand sent at 8/20/2019  2:30 PM CDT -----  Contact: Patient  Patient needs PA for hydrocodone. Please call [phone]

## 2019-08-20 NOTE — TELEPHONE ENCOUNTER
Spoke with pt, Katherine sent a letter stating her Norco does need prior auth as it is on his formulary. Sent letter to Kiara

## 2019-08-21 ENCOUNTER — TELEPHONE (OUTPATIENT)
Dept: ORTHOPEDICS | Facility: CLINIC | Age: 49
End: 2019-08-21

## 2019-08-21 NOTE — TELEPHONE ENCOUNTER
----- Message from Darlene Isabel sent at 8/21/2019  3:14 PM CDT -----  Contact: Patient [phone]  Please call patient her insurance is not covering her Hydrocodone. She is asking someone call her today and tell her why and what she needs to do.

## 2019-08-21 NOTE — TELEPHONE ENCOUNTER
Medicaid will not pay for her pain meds and she wants me to stop what I am doing now and call them. I explained I am in clinic and I will try and call them later and get back with her.

## 2019-08-22 ENCOUNTER — DOCUMENTATION ONLY (OUTPATIENT)
Dept: REHABILITATION | Facility: HOSPITAL | Age: 49
End: 2019-08-22

## 2019-08-22 NOTE — PROGRESS NOTES
PT/PTA face to face conference completed regarding patient treatment plan and progress towards established goals. Treatment will be continued as described in initial report/ evaluation and treatment/progress notes. Patient will be seen by physical therapist every sixth visit or minimally once per month.       Heather Molina, PTA

## 2019-08-22 NOTE — PROGRESS NOTES
Physical Therapy: No show/Cancellation of Visit  Date: 08/22/2019    Patient was a no show to today's PT appointment. Patient's next scheduled appointment is 8/22.  Therapist spoke with patient regarding missed appt.  Pt stated that she was unaware that she had an appt today, since she never has an appt on Thursday.  Patient informed of her next appointment that is scheduled for 8/22 at 8am.  Pt verbalized acknowledgement of appt.     Cancel: 0  No show: 1    Therapist: Angella Lobato PTA

## 2019-08-23 ENCOUNTER — CLINICAL SUPPORT (OUTPATIENT)
Dept: REHABILITATION | Facility: HOSPITAL | Age: 49
End: 2019-08-23
Payer: MEDICAID

## 2019-08-23 DIAGNOSIS — M25.561 RIGHT KNEE PAIN, UNSPECIFIED CHRONICITY: Primary | ICD-10-CM

## 2019-08-23 DIAGNOSIS — R26.9 GAIT ABNORMALITY: ICD-10-CM

## 2019-08-23 DIAGNOSIS — M25.531 RIGHT WRIST PAIN: ICD-10-CM

## 2019-08-23 DIAGNOSIS — M25.532 LEFT WRIST PAIN: ICD-10-CM

## 2019-08-23 PROCEDURE — 97110 THERAPEUTIC EXERCISES: CPT

## 2019-08-23 PROCEDURE — 97530 THERAPEUTIC ACTIVITIES: CPT

## 2019-08-23 PROCEDURE — 97014 ELECTRIC STIMULATION THERAPY: CPT

## 2019-08-23 NOTE — PROGRESS NOTES
"  Physical Therapy Monthly Assessment/updated POC Treatment Note     Name: Sabrina Black  Clinic Number: [id number]    Therapy Diagnosis:   Encounter Diagnoses   Name Primary?    Right knee pain, unspecified chronicity     Gait abnormality      Physician: Alfredo Blanc MD    Visit Date: 8/23/2019    Physician Orders: PT EVAL AND RX  Medical Diagnosis: OA S/P R TKA  Evaluation Date: 6/10/19  Authorization Period Expiration: 8/12/19  Plan of Care Certification Period: 10/11/19 (Monthly Progress Note due 9/23/19)  Visit #/Visits authorized: 9/10     PTA visit: 0/5    Updated POC on 8/23/19 and to continue 2w5 for an additional 10 visits beginning week of 8/26/19)        Time In: 0800  Time Out: 09:05  Total Billable Time: 65 minutes    Precautions: Standard    Subjective     Pt reports Currently relates 4/10 pain to Right knee.      Response to previous treatment: good   Functional change: Reports improvement in ADL's.     Pain: 4/10  Location: right knee      Objective     Sabrina received therapeutic exercises to develop ROM, strength, activity andrei  For 43 minutes including:    ·  Cardio -  Recumbent Bike 10 minutes R:4  ·   Ankle - Gastroc stretch - slant board: 30"x3     Hip - Hamstring Stretch (Seated): 3 x 30second hold     Ankle - Heel Raises: 3 sets of 15    Step ups on step with 1 riser x 10 ea LE lead    Standing Hip Abduction 3 x 10 ea LE     Mini squats 3x10 reps    LAQ 30 x 5'' hold at top 3# AW  ·   Knee - Flexion - heel slide (rope): 2 sets of 15 with 10 second holds (DNP)  ·   Knee - SLR bolster under ankle: 3 x 10 reps with 3-second holds with 3lb AW over knee on RLE; 2# AW on RLE during AROM  ·   Knee - Terminal Knee Extension:  Large Bolster: 3 x15 reps 3# and 3 second holds  ·   Transfers - Sit to Stand: 15 times with staggered stance/foot placement from [facility] for increased R LE use.   ·   Knee - prone knee hangs: 5 minutes /c 6# AW (DNP)  ·   Knee - Cybex Leg Press: 2 x 10 reps 4 plates /c 5 " sec hold at the bottom for knee flexion stretch      Knee cybex extension stretch on HS curl. 4 plates 10 minute extended stretch. /c CP (DNP)      Manual Therapy:   Patellar Glides 4 ways: x 0 minutes (patellar all directions ) (ROSEMARIE)      Patient participated in dynamic functional therapeutic activities to improve functional performance for 12  minutes, including:  ROM, stairs, and functional mob.        7/12/19: R knee extension: -7*; R knee flexion: 112*  7/15/19: R knee extension: -7: R knee flexion 114*  7/24/19: Patient ascended/descended 18 steps with HR and SC with a step to gt pattern and with VC for hand and foot placement and with a noted decrease in eccentric control of R knee.     8/5/19 R knee extension -6 following cybex stretch   8/19/19: R knee flexion measured in sitting 118* extension not measured    8/23/19 AROM R knee -5 deg to 115,  PROM to 120 flexion.     Patient ascended/descended 18 steps with HR  with a step to gt pattern and with VC for hand and foot placement,with a noted decrease in eccentric control of R knee.          MMT      Right Knee ext 4/5       Patient received the following supervised modalities after being cleared for contradictions: TENS:  Sabrina received TENS electrical stimulation for pain to the R knee x 10 min.    Sabrina tolerated treatment well without any adverse effects.       Knee cybex extension stretch on HS curl. 4 plates 10 minutes extended stretch with estim and  CP        Assessment     Patient participated with Rx to address her impairments to her Right knee/LE and is steadily progressing with her ROM strength activity andrei and gt.  Currently she demonstrates a PROM of -5 deg to 120 and AROM of -5 to 115 deg.  Contributing to her gt abnormalities, limited stair negotiation, as well as her limitations with her day to day house hold and community mobility.  She cont to require VC/TC with her TE in order to improve her exs quality and performance to target  increased quad engagement, and for improved eccentric control with stairs.  Moreover, she requires recovery periods as she fatigues easily.  She has met all STG and LTG 5.  LTG 4, 6, 7,  And 8 are in progress.  She is expected to cont to progress in order to optimize her safe functional [place] with cont PT for 2 times a week for an additional 10 visits beginning week of 8/26/19.        Sabrina is progressing well towards her goals.   Pt prognosis is Good.     Pt will continue to benefit from skilled outpatient physical therapy to address the deficits listed in the problem list box on initial evaluation, provide pt/family education and to maximize pt's level of independence in the home and community environment.     Pt's spiritual, cultural and educational needs considered and pt agreeable to plan of care and goals.     Anticipated barriers to physical therapy: N/A    Goals:     Short Term Goals  Status Goal   Met 7/8/19 1. Patient will report consistently elevating her R LE and performing ankle pumps to decrease swelling within 4 visits.   Met 7/8/19 2. Patient will demonstrate ability to perform proper setup for STS from chair to improve transfer ability at home within 6 visits.   Met 7/8/19 3. Patient will be independent with initial HEP for R LE knee strength and ROM to improve therapy outcomes within 8 visits.   Long Term Goals  Status Goal   In Progress 8/23/2019      4. Patient will report R LE pain at worst as </=4/10 to improve QoL within 24 visits.   Met 7/24/19 5. Patient will improve LEFS score from 12/80 to >/=40/80 to demonstrate an improvement in functional status within 24 visits.   In Progress 8/23/2019     6. Patient will improve R LE knee extension AROM to 0 degrees to improve ability to perform proper heel strike when ambulating within 24 visits.   In Progress 8/23/2019     7. Patient will demonstrate ability to ascend/descend 18 steps in PT gym with step through gait pattern and UE handrail  assist to improve community ambulatory ability within 24 visits.   In Progress 8/23/2019     8. Patient will improve R LE knee strength to >/=4+/5 to improve ability to safely perform sit to stand transfers within 24 visits.         Plan       POC valid from 8/26/19 through 10/11/19. 2Times a week for an additional 10 visits, with treatments to consist of: Balance (52162): Improve overall coordination and balance, Cardiovascular, Closed Chain Strengthening, Core Stabilization, Flexibility (12191): improve muscle ROM, flexibility and  function, Home Exercise and Stretching, Patient Education, Plyometrics, Postural Awareness and  Training, Postural Stabilization, ROM Exercises (67972) : Passive or active activities to increase joint ROM, Strengthening  (59682): improve muscle strength and function., Therapeutic Exercise (69317): improve muscle strength, ROM, flexibility and muscle function., Gait Training (37117) : Improve overall gait function including stair climbing, Cross Friction Massage, Manual Stretching (01389): passive or active stretching to improve muscle length and function., Strain/Counter-Strain, Manual Traction, Myofascial Release , Peripheral Joint Mobilization, Soft Tissue Mobs (88932): increase ROM, tissue length, joint mechanics and modulate pain., Spine Mobilization  (99246): increase ROM, tissue length, joint mechanics and modulate pain., Massage (34757):, Combo E-Stim/Ultrasound, Cryotherapy (71242: Application of cold to decrease local swelling and decrease pain., Heat - 32622:  Application of heat to increase local circulation and decrease pain., Hi-Volt E-Stim  (): Application of electrical stimulation to modulate pain., IFC E-Stim (): Application of electrical stimulation to modulate pain., Mechanical Traction (88854), Micro-Current, Premodulated E-Stim  (): Application of electrical stimulation to modulate pain., TENs E-Stim  (): Application of electrical  stimulation to modulate pain., Ultrasound (57016): increase local circulation, improve tissue healing time and modulate pain., Whirlpool (48468): increase local tissue circulation, improve elasticity of tissues, increased blood flow for improved muscle strength, ROM, flexiblity, and function., NMES E-stim ():  Application of electrical stimulation for motor learning and control., Iontophoresis (14528), NMR (87212): re-education of movement, balance, coordination, kinesthetic sense, posture and proprioception, Self Care & Home Management (89807) - Self-care/home management training (e.g., activities of daily living [ADL] and compensatory training, meal preparation, safety procedures, and instructions in use of assistive technology devices/adaptive equipment), direct one-on-one contact (15 minutes), Therapeutic Activity (61673):  Use of dynamic activities to improve functional performance..        Michael Raza PT

## 2019-08-23 NOTE — PLAN OF CARE
"  Physical Therapy Monthly Assessment/updated POC Treatment Note     Name: Sabrina Black  Clinic Number: [id number]    Therapy Diagnosis:   Encounter Diagnoses   Name Primary?    Right knee pain, unspecified chronicity     Gait abnormality      Physician: Alfredo Blanc MD    Visit Date: 8/23/2019    Physician Orders: PT EVAL AND RX  Medical Diagnosis: OA S/P R TKA  Evaluation Date: 6/10/19  Authorization Period Expiration: 8/12/19  Plan of Care Certification Period: 10/11/19 (Monthly Progress Note due 9/23/19)  Visit #/Visits authorized: 9/10     PTA visit: 0/5    Updated POC on 8/23/19 and to continue 2w5 for an additional 10 visits beginning week of 8/26/19)        Time In: 0800  Time Out: 09:05  Total Billable Time: 65 minutes    Precautions: Standard    Subjective     Pt reports Currently relates 4/10 pain to Right knee.      Response to previous treatment: good   Functional change: Reports improvement in ADL's.     Pain: 4/10  Location: right knee      Objective     Sabrina received therapeutic exercises to develop ROM, strength, activity andrei  For 43 minutes including:    ·  Cardio -  Recumbent Bike 10 minutes R:4  ·   Ankle - Gastroc stretch - slant board: 30"x3     Hip - Hamstring Stretch (Seated): 3 x 30second hold     Ankle - Heel Raises: 3 sets of 15    Step ups on step with 1 riser x 10 ea LE lead    Standing Hip Abduction 3 x 10 ea LE     Mini squats 3x10 reps    LAQ 30 x 5'' hold at top 3# AW  ·   Knee - Flexion - heel slide (rope): 2 sets of 15 with 10 second holds (DNP)  ·   Knee - SLR bolster under ankle: 3 x 10 reps with 3-second holds with 3lb AW over knee on RLE; 2# AW on RLE during AROM  ·   Knee - Terminal Knee Extension:  Large Bolster: 3 x15 reps 3# and 3 second holds  ·   Transfers - Sit to Stand: 15 times with staggered stance/foot placement from [facility] for increased R LE use.   ·   Knee - prone knee hangs: 5 minutes /c 6# AW (DNP)  ·   Knee - Cybex Leg Press: 2 x 10 reps 4 plates /c 5 " sec hold at the bottom for knee flexion stretch      Knee cybex extension stretch on HS curl. 4 plates 10 minute extended stretch. /c CP (DNP)      Manual Therapy:   Patellar Glides 4 ways: x 0 minutes (patellar all directions ) (ROSEMARIE)      Patient participated in dynamic functional therapeutic activities to improve functional performance for 12  minutes, including:  ROM, stairs, and functional mob.        7/12/19: R knee extension: -7*; R knee flexion: 112*  7/15/19: R knee extension: -7: R knee flexion 114*  7/24/19: Patient ascended/descended 18 steps with HR and SC with a step to gt pattern and with VC for hand and foot placement and with a noted decrease in eccentric control of R knee.     8/5/19 R knee extension -6 following cybex stretch   8/19/19: R knee flexion measured in sitting 118* extension not measured    8/23/19 AROM R knee -5 deg to 115,  PROM to 120 flexion.     Patient ascended/descended 18 steps with HR  with a step to gt pattern and with VC for hand and foot placement,with a noted decrease in eccentric control of R knee.          MMT      Right Knee ext 4/5       Patient received the following supervised modalities after being cleared for contradictions: TENS:  Sabrina received TENS electrical stimulation for pain to the R knee x 10 min.    Sabrina tolerated treatment well without any adverse effects.       Knee cybex extension stretch on HS curl. 4 plates 10 minutes extended stretch with estim and  CP        Assessment     Patient participated with Rx to address her impairments to her Right knee/LE and is steadily progressing with her ROM strength activity andrei and gt.  Currently she demonstrates a PROM of -5 deg to 120 and AROM of -5 to 115 deg.  Contributing to her gt abnormalities, limited stair negotiation, as well as her limitations with her day to day house hold and community mobility.  She cont to require VC/TC with her TE in order to improve her exs quality and performance to target  increased quad engagement, and for improved eccentric control with stairs.  Moreover, she requires recovery periods as she fatigues easily.  She has met all STG and LTG 5.  LTG 4, 6, 7,  And 8 are in progress.  She is expected to cont to progress in order to optimize her safe functional [place] with cont PT for 2 times a week for an additional 10 visits beginning week of 8/26/19.        Sabrina is progressing well towards her goals.   Pt prognosis is Good.     Pt will continue to benefit from skilled outpatient physical therapy to address the deficits listed in the problem list box on initial evaluation, provide pt/family education and to maximize pt's level of independence in the home and community environment.     Pt's spiritual, cultural and educational needs considered and pt agreeable to plan of care and goals.     Anticipated barriers to physical therapy: N/A    Goals:     Short Term Goals  Status Goal   Met 7/8/19 1. Patient will report consistently elevating her R LE and performing ankle pumps to decrease swelling within 4 visits.   Met 7/8/19 2. Patient will demonstrate ability to perform proper setup for STS from chair to improve transfer ability at home within 6 visits.   Met 7/8/19 3. Patient will be independent with initial HEP for R LE knee strength and ROM to improve therapy outcomes within 8 visits.   Long Term Goals  Status Goal   In Progress 8/23/2019      4. Patient will report R LE pain at worst as </=4/10 to improve QoL within 24 visits.   Met 7/24/19 5. Patient will improve LEFS score from 12/80 to >/=40/80 to demonstrate an improvement in functional status within 24 visits.   In Progress 8/23/2019     6. Patient will improve R LE knee extension AROM to 0 degrees to improve ability to perform proper heel strike when ambulating within 24 visits.   In Progress 8/23/2019     7. Patient will demonstrate ability to ascend/descend 18 steps in PT gym with step through gait pattern and UE handrail  assist to improve community ambulatory ability within 24 visits.   In Progress 8/23/2019     8. Patient will improve R LE knee strength to >/=4+/5 to improve ability to safely perform sit to stand transfers within 24 visits.         Plan       POC valid from 8/26/19 through 10/11/19. 2Times a week for an additional 10 visits, with treatments to consist of: Balance (71980): Improve overall coordination and balance, Cardiovascular, Closed Chain Strengthening, Core Stabilization, Flexibility (97981): improve muscle ROM, flexibility and  function, Home Exercise and Stretching, Patient Education, Plyometrics, Postural Awareness and  Training, Postural Stabilization, ROM Exercises (82655) : Passive or active activities to increase joint ROM, Strengthening  (94012): improve muscle strength and function., Therapeutic Exercise (16431): improve muscle strength, ROM, flexibility and muscle function., Gait Training (05321) : Improve overall gait function including stair climbing, Cross Friction Massage, Manual Stretching (96913): passive or active stretching to improve muscle length and function., Strain/Counter-Strain, Manual Traction, Myofascial Release , Peripheral Joint Mobilization, Soft Tissue Mobs (17113): increase ROM, tissue length, joint mechanics and modulate pain., Spine Mobilization  (22086): increase ROM, tissue length, joint mechanics and modulate pain., Massage (39199):, Combo E-Stim/Ultrasound, Cryotherapy (16194: Application of cold to decrease local swelling and decrease pain., Heat - 52170:  Application of heat to increase local circulation and decrease pain., Hi-Volt E-Stim  (): Application of electrical stimulation to modulate pain., IFC E-Stim (): Application of electrical stimulation to modulate pain., Mechanical Traction (16381), Micro-Current, Premodulated E-Stim  (): Application of electrical stimulation to modulate pain., TENs E-Stim  (): Application of electrical  stimulation to modulate pain., Ultrasound (60833): increase local circulation, improve tissue healing time and modulate pain., Whirlpool (21174): increase local tissue circulation, improve elasticity of tissues, increased blood flow for improved muscle strength, ROM, flexiblity, and function., NMES E-stim ():  Application of electrical stimulation for motor learning and control., Iontophoresis (27864), NMR (86523): re-education of movement, balance, coordination, kinesthetic sense, posture and proprioception, Self Care & Home Management (71580) - Self-care/home management training (e.g., activities of daily living [ADL] and compensatory training, meal preparation, safety procedures, and instructions in use of assistive technology devices/adaptive equipment), direct one-on-one contact (15 minutes), Therapeutic Activity (40497):  Use of dynamic activities to improve functional performance..            Michael Raza PT

## 2019-08-23 NOTE — PROGRESS NOTES
"               [facility] Outpatient Occupational Therapy Daily Treatment Note       Date: 8/23/2019  Name: Sabrina Black  Clinic Number: [id number]    Therapy Diagnosis:   Encounter Diagnoses   Name Primary?    Left wrist pain     Right wrist pain      Physician: Lee Ann Davis NP    Physician Orders: Eval and treat  Medical Diagnosis: bilateral carpal tunnel syndrome  Surgical Procedure and Date: N/A  Eval Date: 8/05/2019   Insurance Authorization Period Expiration: 8/07/2019-9/27/2019  Plan of Care Certification Period: 8/05/2019-9/27/2019    Date of Return to MD: TBD    Visit # / Visits authorized:4/12  Time In:9:05  Time Out: 10:00  Total Billable Time: 53 minutes    Precautions:  Standard, diabetes    Subjective     Pt reports:  Patient reports "Getting tingling; numbness all the way up to my (R) elbow"     Objective    Patient received the following treatment:       Therapeutic activities to improve functional performance with use of dynamic activities for 53 mins  Including:    NP=NOT PERFORMED  Ultrasound  for pain control and to increase tissue elasticity @ 50 duty cycle, 3 Mhz, applied to right carpal tunnel area, intensity =0.8 w/cm2 for 8 minutes right hand  Soft tissue massage to Carpal tunnel area and stretches to transverse carpal ligaments  Massage and stretch of forearm flexors right and left  to increase tissue elasticity and decrease pain  Joint blocking of PIPs and DIPs flexion each 10 reps to glide through tunnel each hand  Free weight: wrist 1# flexion, extension and deviations 20 each R and L  Theraputty  RED gripping and pinching  R and L  Median n glides both hands x 10 reps each with therapist assist to maintain wrist ext as supinated    Patient required moderate cuing for correct performance of ex    Assessment   Patient noting tingling/numbness at right forearm is constant.  Tightness noted at both forearm flexors.  No soreness noted at left forearm with massage. No indication of " increase in pain/numbness with activities today.    Plan   Continue skilled therapy 2 times a week with ther ex and activities, manual therapy, and pain modalities as needed.       HALIE Bueno

## 2019-08-26 ENCOUNTER — CLINICAL SUPPORT (OUTPATIENT)
Dept: REHABILITATION | Facility: HOSPITAL | Age: 49
End: 2019-08-26
Payer: MEDICAID

## 2019-08-26 DIAGNOSIS — R26.9 GAIT ABNORMALITY: ICD-10-CM

## 2019-08-26 DIAGNOSIS — M25.532 LEFT WRIST PAIN: ICD-10-CM

## 2019-08-26 DIAGNOSIS — M25.561 RIGHT KNEE PAIN, UNSPECIFIED CHRONICITY: Primary | ICD-10-CM

## 2019-08-26 DIAGNOSIS — M25.531 RIGHT WRIST PAIN: ICD-10-CM

## 2019-08-26 PROCEDURE — 97014 ELECTRIC STIMULATION THERAPY: CPT

## 2019-08-26 PROCEDURE — 97530 THERAPEUTIC ACTIVITIES: CPT

## 2019-08-26 PROCEDURE — 97110 THERAPEUTIC EXERCISES: CPT

## 2019-08-26 NOTE — PROGRESS NOTES
"  Physical Therapy Monthly Assessment/updated POC Treatment Note     Name: Sabrina Black  Clinic Number: [id number]    Therapy Diagnosis:   Encounter Diagnoses   Name Primary?    Right knee pain, unspecified chronicity     Gait abnormality      Physician: Alfredo Blanc MD    Visit Date: 8/26/2019    Physician Orders: PT EVAL AND RX  Medical Diagnosis: OA S/P R TKA  Evaluation Date: 6/10/19  Authorization Period Expiration: 8/12/19  Plan of Care Certification Period: 10/11/19 (Monthly Progress Note due 9/23/19)  Visit #/Visits authorized: 10/10     PTA visit: 0/5    Updated POC on 8/23/19 and to continue 2w5 for an additional 10 visits beginning week of 8/26/19)        Time In: 0800  Time Out: 09:05  Total Billable Time: 65 minutes    Precautions: Standard    Subjective     Pt reports Currently relates 2/10 pain to Right knee and states she is a little tired this day.      Response to previous treatment: good   Functional change: Reports improvement in ADL's.     Pain: /10  Location: right knee      Objective     Sabrina received therapeutic exercises to develop ROM, strength, activity andrei  For 43 minutes including:    ·  Cardio -  Recumbent Bike 10 minutes R:4  ·   Ankle - Gastroc stretch - slant board: 30"x3     Hip - Hamstring Stretch (Seated): 3 x 30second hold     Ankle - Heel Raises: 3 sets of 15    Step ups on step with 1 riser x 10 ea LE lead (for con    Standing Hip Abduction 3 x 10 ea LE (with PTB)    Mini squats 3x10 reps    LAQ 30 x 5'' hold at top 3# AW  ·   Knee - Flexion - heel slide (rope): 2 sets of 15 with 10 second holds (DNP)  ·   Knee - SLR bolster under ankle: 3 x 10 reps with 3-second holds with 3lb AW over knee on RLE; 2# AW on RLE during AROM  ·   Knee - Terminal Knee Extension:  Large Bolster: 3 x15 reps 3# and 3 second holds  ·   Transfers - Sit to Stand: 15 times with staggered stance/foot placement from int for increased R LE use.   ·   Knee - prone knee hangs: 5 minutes /c 6# " AW  ·   Knee - Cybex Leg Press: 2 x 10 reps 4 plates /c 5 sec hold at the bottom for knee flexion stretch      Knee cybex extension stretch on HS curl. 4 plates 10 minute extended stretch. /c CP      Manual Therapy:   Patellar Glides 4 ways: x 0 minutes (patellar all directions)      Patient participated in dynamic functional therapeutic activities to improve functional performance for 0  minutes, including:  ROM, stairs, and functional mob.        7/12/19: R knee extension: -7*; R knee flexion: 112*  7/15/19: R knee extension: -7: R knee flexion 114*  7/24/19: Patient ascended/descended 18 steps with HR and SC with a step to gt pattern and with VC for hand and foot placement and with a noted decrease in eccentric control of R knee.     8/5/19 R knee extension -6 following cybex stretch   8/19/19: R knee flexion measured in sitting 118* extension not measured    8/23/19 AROM R knee -5 deg to 115,  PROM to 120 flexion.     Patient ascended/descended 18 steps with HR  with a step to gt pattern and with VC for hand and foot placement,with a noted decrease in eccentric control of R knee.          MMT      Right Knee ext 4/5       Patient received the following supervised modalities after being cleared for contradictions: TENS:  Sabrina received TENS electrical stimulation for pain to the R knee x 10 min.    Sabrina tolerated treatment well without any adverse effects.       Knee cybex extension stretch on HS curl. 4 plates 10 minutes extended stretch with estim and  CP        Assessment       Patient participated with PT to address her ROM, strength activity andrei and mobility in order to improve her gt quality, ADLs, house hold and community mob.  She was able to andrei progression with resistance with PTB as well as step downs for improved eccentric control to R quadriceps.  She required VC/TC for exs quality and performance and with several recovery periods as she fatigued easily.  She is expected to progress with cont Rx  in order to optimize her functional mob and progress to her established goals       Sabrina is progressing well towards her goals.   Pt prognosis is Good.     Pt will continue to benefit from skilled outpatient physical therapy to address the deficits listed in the problem list box on initial evaluation, provide pt/family education and to maximize pt's level of independence in the home and community environment.     Pt's spiritual, cultural and educational needs considered and pt agreeable to plan of care and goals.     Anticipated barriers to physical therapy: N/A    Goals:     Short Term Goals  Status Goal   Met 7/8/19 1. Patient will report consistently elevating her R LE and performing ankle pumps to decrease swelling within 4 visits.   Met 7/8/19 2. Patient will demonstrate ability to perform proper setup for STS from chair to improve transfer ability at home within 6 visits.   Met 7/8/19 3. Patient will be independent with initial HEP for R LE knee strength and ROM to improve therapy outcomes within 8 visits.   Long Term Goals  Status Goal   In Progress 8/26/2019      4. Patient will report R LE pain at worst as </=4/10 to improve QoL within 24 visits.   Met 7/24/19 5. Patient will improve LEFS score from 12/80 to >/=40/80 to demonstrate an improvement in functional status within 24 visits.   In Progress 8/26/2019     6. Patient will improve R LE knee extension AROM to 0 degrees to improve ability to perform proper heel strike when ambulating within 24 visits.   In Progress 8/26/2019     7. Patient will demonstrate ability to ascend/descend 18 steps in PT gym with step through gait pattern and UE handrail assist to improve community ambulatory ability within 24 visits.   In Progress 8/26/2019     8. Patient will improve R LE knee strength to >/=4+/5 to improve ability to safely perform sit to stand transfers within 24 visits.         Plan     Cont with current POC prog resistance as andrei and return to Patellar  debora.        Michael Raza PT

## 2019-08-26 NOTE — PROGRESS NOTES
"  [facility] Outpatient Occupational Therapy Daily Treatment Note       Date: 8/26/2019  Name: Sabrina Black  Clinic Number: [id number]    Therapy Diagnosis: Bilateral wrist pain  Physician: Lee Ann Davis NP    Physician Orders: Eval and treat  Medical Diagnosis: bilateral carpal tunnel syndrome  Surgical Procedure and Date: N/A  Eval Date: 8/05/2019   Insurance Authorization Period Expiration: 8/07/2019-9/27/2019  Plan of Care Certification Period: 8/05/2019-9/27/2019    Date of Return to MD: TBD    Visit # / Visits authorized:5/12  Time In:9:05  Time Out: 10:00  Total Billable Time: 53 minutes    Precautions:  Standard, diabetes    Subjective     Pt reports:  Patient reports "The tingling in my right arm is constant." As performing US noted tingling radiating into right forearm indicating from thenar to dorsal distal aspect of radius.    Objective    Patient received the following treatment:       Therapeutic activities to improve functional performance with use of dynamic activities for 53 mins  Including:    NP=NOT PERFORMED  Ultrasound  for pain control and to increase tissue elasticity @ 50 duty cycle, 3 Mhz, applied to right carpal tunnel area, intensity =0.8 w/cm2 decreased to 0.6 for 8 minutes right hand  Soft tissue massage to Carpal tunnel area and stretches to transverse carpal ligaments  Massage and stretch of forearm flexors right and left  to increase tissue elasticity and decrease pain  Joint blocking of PIPs and DIPs flexion each 10 reps to glide through tunnel each hand  Free weight: wrist 2# flexion, extension and deviations 30 each R and L  Theraputty  RED gripping and pinching  R and L  Median n glides both hands x 10 reps each with therapist assist to maintain wrist ext as supinated    Patient required moderate cuing for correct performance of ex    Assessment   Patient noting resistive ex hard but with no increase in symptoms. Able to perform increase in wrist weight (1#) with " no difficulty. Patient with increase in tingling at times with US decreased intensity to 0.6    Plan   Continue skilled therapy 2 times a week with ther ex and activities, manual therapy, and pain modalities as needed.       HALIE Bueno

## 2019-08-28 ENCOUNTER — CLINICAL SUPPORT (OUTPATIENT)
Dept: REHABILITATION | Facility: HOSPITAL | Age: 49
End: 2019-08-28
Payer: MEDICAID

## 2019-08-28 DIAGNOSIS — M25.532 LEFT WRIST PAIN: ICD-10-CM

## 2019-08-28 DIAGNOSIS — M25.531 RIGHT WRIST PAIN: ICD-10-CM

## 2019-08-28 DIAGNOSIS — G56.03 BILATERAL CARPAL TUNNEL SYNDROME: Primary | ICD-10-CM

## 2019-08-28 PROCEDURE — 97530 THERAPEUTIC ACTIVITIES: CPT

## 2019-08-28 PROCEDURE — 97110 THERAPEUTIC EXERCISES: CPT

## 2019-08-28 NOTE — PROGRESS NOTES
"  Physical Therapy Monthly Assessment/updated POC Treatment Note     Name: Sabrina Black  Clinic Number: [id number]    Therapy Diagnosis:   Encounter Diagnoses   Name Primary?    Right knee pain, unspecified chronicity     Gait abnormality      Physician: Alfredo Blanc MD    Visit Date: 8/28/2019    Physician Orders: PT EVAL AND RX  Medical Diagnosis: OA S/P R TKA  Evaluation Date: 6/10/19  Authorization Period Expiration: 8/12/19  Plan of Care Certification Period: 10/11/19 (Monthly Progress Note due 9/23/19)  Visit #/Visits authorized: TBD    PTA visit: 1/5            Time In: 1000  Time Out: 1100  Total Billable Time: 65 minutes    Precautions: Standard    Subjective     Pt reports No new complaints on todays date.     Response to previous treatment: good   Functional change: Reports improvement in ADL's.     Pain: 4 /10  Location: right knee      Objective     Sabrina received therapeutic exercises to develop ROM, strength, activity andrei  For 43 minutes including:    ·  Cardio -  Recumbent Bike 10 minutes R:4  ·   Ankle - Gastroc stretch - slant board: 30"x3     Hip - Hamstring Stretch (Seated): 3 x 30second hold     Ankle - Heel Raises: 3 sets of 15    Step ups on step with 1 riser x 10 ea LE lead (for con    Standing Hip Abduction 3 x 10 ea LE (with PTB)    Mini squats 3x10 reps    LAQ 30 x 5'' hold at top 3# AW  ·   Knee - Flexion - heel slide (rope): 2 sets of 15 with 10 second holds (DNP)  ·   Knee - SLR bolster under ankle: 3 x 10 reps with 3-second holds with 3lb AW over knee on RLE; 2# AW on RLE during AROM  ·   Knee - Terminal Knee Extension:  Large Bolster: 3 x15 reps 3# and 3 second holds  ·   Transfers - Sit to Stand: 15 times with staggered stance/foot placement from int for increased R LE use.   ·   Knee - prone knee hangs: 5 minutes /c 6# AW  ·   Knee - Cybex Leg Press: 2 x 10 reps 4 plates /c 5 sec hold at the bottom for knee flexion stretch      Knee- Cybex HS curl 3 x 10 reps 3 plates     " Knee cybex extension stretch on HS curl. 4 plates 10 minute extended stretch. /c CP      Manual Therapy:   Patellar Glides 4 ways: x 0 minutes (patellar all directions)    Following was not performed:  Patient participated in dynamic functional therapeutic activities to improve functional performance for 0  minutes, including:  ROM, stairs, and functional mob.        7/12/19: R knee extension: -7*; R knee flexion: 112*  7/15/19: R knee extension: -7: R knee flexion 114*  7/24/19: Patient ascended/descended 18 steps with HR and SC with a step to gt pattern and with VC for hand and foot placement and with a noted decrease in eccentric control of R knee.     8/5/19 R knee extension -6 following cybex stretch   8/19/19: R knee flexion measured in sitting 118* extension not measured    8/23/19 AROM R knee -5 deg to 115,  PROM to 120 flexion.     Patient ascended/descended 18 steps with HR  with a step to gt pattern and with VC for hand and foot placement,with a noted decrease in eccentric control of R knee.          MMT      Right Knee ext 4/5       Patient received the following supervised modalities after being cleared for contradictions: TENS:  Sabrina received TENS electrical stimulation for pain to the R knee x 10 min.    Sabrina tolerated treatment well without any adverse effects.       Knee cybex extension stretch on HS curl. 4 plates 10 minutes extended stretch with  CP        Assessment       Pt tolerated incorporating cybex HS curls into exercises well without onset of incident. Pt req's cueing to improve form and execution of mini squats /c good response as improvement was noted. Pt will continue to benefit from skilled PT to improve R knee extension to allow pt to ambulate /c proper gait sequencing and return to pain free independence /c ADL's.    Sabrina is progressing well towards her goals.   Pt prognosis is Good.     Pt will continue to benefit from skilled outpatient physical therapy to address the  deficits listed in the problem list box on initial evaluation, provide pt/family education and to maximize pt's level of independence in the home and community environment.     Pt's spiritual, cultural and educational needs considered and pt agreeable to plan of care and goals.     Anticipated barriers to physical therapy: N/A    Goals:     Short Term Goals  Status Goal   Met 7/8/19 1. Patient will report consistently elevating her R LE and performing ankle pumps to decrease swelling within 4 visits.   Met 7/8/19 2. Patient will demonstrate ability to perform proper setup for STS from chair to improve transfer ability at home within 6 visits.   Met 7/8/19 3. Patient will be independent with initial HEP for R LE knee strength and ROM to improve therapy outcomes within 8 visits.   Long Term Goals  Status Goal   In Progress 8/28/2019      4. Patient will report R LE pain at worst as </=4/10 to improve QoL within 24 visits.   Met 7/24/19 5. Patient will improve LEFS score from 12/80 to >/=40/80 to demonstrate an improvement in functional status within 24 visits.   In Progress 8/28/2019     6. Patient will improve R LE knee extension AROM to 0 degrees to improve ability to perform proper heel strike when ambulating within 24 visits.   In Progress 8/28/2019     7. Patient will demonstrate ability to ascend/descend 18 steps in PT gym with step through gait pattern and UE handrail assist to improve community ambulatory ability within 24 visits.   In Progress 8/28/2019     8. Patient will improve R LE knee strength to >/=4+/5 to improve ability to safely perform sit to stand transfers within 24 visits.         Plan     Cont with current POC prog resistance as andrei and return to Patellar glides.        Michael Raza PT

## 2019-08-28 NOTE — PROGRESS NOTES
"  [facility] Outpatient Occupational Therapy Daily Treatment Note       Date: 8/28/2019  Name: Sabrina Black  Clinic Number: [id number]    Therapy Diagnosis: Bilateral wrist pain  Physician: Alfredo Blanc MD    Physician Orders: Eval and treat  Medical Diagnosis: bilateral carpal tunnel syndrome  Surgical Procedure and Date: N/A  Eval Date: 8/05/2019       8/28   Insurance Authorization Period Expiration: 8/07/2019-9/27/2019  Plan of Care Certification Period: 8/05/2019-9/27/2019    Date of Return to MD: TBD    Visit # / Visits authorized:6/12  Time In:9:00  Time Out: 10:00  Total Billable Time: 54 minutes    Precautions:  Standard, diabetes    Subjective     Pt reports:  Patient reports "The numbness feels like it's going away, still have tingling."    Objective    Patient received the following treatment:       Therapeutic activities to improve functional performance with use of dynamic activities for 53 mins  Including:    NP=NOT PERFORMED  Ultrasound  for pain control and to increase tissue elasticity @ 50 duty cycle, 3 Mhz, applied to right carpal tunnel area, intensity =0.8 w/cm2 decreased to 0.6 for 8 minutes right hand  Soft tissue massage to Carpal tunnel area and stretches to transverse carpal ligaments  Massage and stretch of forearm flexors right and left  to increase tissue elasticity and decrease pain  Joint blocking of PIPs and DIPs flexion each 10 reps to glide through tunnel each hand  NP-Free weight: wrist 2# flexion, extension and deviations 30 each R and L  Theraputty  GREEN gripping and pinching  R and L  Median n glides both hands x 10 reps each with therapist assist to maintain wrist ext as supinated  Flexbar-GREEN simulated towel wringing and screw top turning  20 reps each  Graded clothespins-all on/off container rim YELLOW to BLACK total of 20 clothespins  Patient required moderate cuing for correct performance of ex    (Orderlord dynamometer)      8/05/2019               "                                  8/28/2019   (in lbs) Trial   1            2        3           Average                    1        2         3    Average    Right                     40         50       40            43                           50      50       41       47    Left                       34         30       35            33                           35      37       32       34      Assessment   Patient reporting a decrease in numbness, tingling persisting,   strengths increasing.  No indication of increase in symptoms with interventions.    Plan   Continue skilled therapy 2 times a week with ther ex and activities, manual therapy, and pain modalities as needed.       HALIE Bueno

## 2019-09-03 DIAGNOSIS — Z96.651 S/P TOTAL KNEE REPLACEMENT, RIGHT: Primary | ICD-10-CM

## 2019-09-03 RX ORDER — HYDROCODONE BITARTRATE AND ACETAMINOPHEN 5; 325 MG/1; MG/1
1 TABLET ORAL EVERY 6 HOURS PRN
Qty: 28 TABLET | Refills: 0 | Status: SHIPPED | OUTPATIENT
Start: 2019-09-03 | End: 2019-09-10

## 2019-09-03 NOTE — TELEPHONE ENCOUNTER
----- Message from Raeann Del Real sent at 9/3/2019  2:09 PM CDT -----  Contact: patient   Pt called and is asking for her pain medication to be refilled- Hydrocodone.      PTS # [phone]

## 2019-09-04 ENCOUNTER — CLINICAL SUPPORT (OUTPATIENT)
Dept: REHABILITATION | Facility: HOSPITAL | Age: 49
End: 2019-09-04
Payer: MEDICAID

## 2019-09-04 DIAGNOSIS — M25.531 RIGHT WRIST PAIN: ICD-10-CM

## 2019-09-04 DIAGNOSIS — R26.9 GAIT ABNORMALITY: ICD-10-CM

## 2019-09-04 DIAGNOSIS — M25.561 RIGHT KNEE PAIN, UNSPECIFIED CHRONICITY: ICD-10-CM

## 2019-09-04 DIAGNOSIS — M25.532 LEFT WRIST PAIN: ICD-10-CM

## 2019-09-04 PROCEDURE — 97530 THERAPEUTIC ACTIVITIES: CPT

## 2019-09-04 PROCEDURE — 97110 THERAPEUTIC EXERCISES: CPT

## 2019-09-04 NOTE — PROGRESS NOTES
"  Physical Therapy Daily Treatment Note     Name: Sabrina Black  Clinic Number: [id number]    Therapy Diagnosis:   Encounter Diagnoses   Name Primary?    Right knee pain, unspecified chronicity     Gait abnormality      Physician: Alfredo Blanc MD    Visit Date: 9/4/2019    Physician Orders: PT EVAL AND RX  Medical Diagnosis: OA S/P R TKA  Evaluation Date: 6/10/19  Authorization Period Expiration: 10/03/2019  Plan of Care Certification Period: 10/11/19 (Monthly Progress Note due 9/23/19)  Visit #/Visits authorized: 1/12    PTA visit: 0/5    Time In: 0800  Time Out: 0900  Total Billable Time: 30 minutes    Precautions: Standard    Subjective     Pt reports No new complaints on todays date.     Response to previous treatment: good   Functional change: Reports improvement in ADL's.     Pain: 4 /10  Location: right knee      Objective     Sabrina received therapeutic exercises to develop ROM, strength, activity total for 30 minutes including:    ·  Cardio -  Recumbent Bike 10 minutes R:4  ·   Ankle - Gastroc stretch - slant board: 30"x3     Hip - Hamstring Stretch (Seated): 3 x 30second hold DNP     Ankle - Heel Raises: 3 sets of 15    Step ups on step with 2 riser x 10 ea LE lead (for con    Standing Hip Abduction 3 x 10 ea LE (with #4 AW)    Mini squats 3x10 reps    LAQ 30 x 5'' hold at top 4# AW  ·   Knee - Flexion - heel slide (rope): 2 sets of 15 with 10 second holds   ·   Knee - SLR bolster under ankle: 3 x 10 reps with 3-second holds with 2lb AW over knee on RLE; 2# AW on RLE during AROM  ·   Knee - Terminal Knee Extension:  Large Bolster: 3 x15 reps 3# and 3 second holds  DNP  ·   Transfers - Sit to Stand: 15 times with staggered stance/foot placement from [facility] for increased R LE use. DNP  ·   Knee - prone knee hangs: 5 minutes /c 6# AW DNP  ·   Knee - Cybex Leg Press: 2 x 10 reps 4 plates /c 5 sec hold at the bottom for knee flexion stretch      Knee- Cybex HS curl 3 x 10 reps 3 plates     Knee cybex " extension stretch on HS curl. 4 plates 5 minute extended stretch.      Manual Therapy:   Patellar Glides 4 ways: x 0 minutes (patellar all directions) DNP    Following was not performed:  Patient participated in dynamic functional therapeutic activities to improve functional performance for 0  minutes, including:  ROM, stairs, and functional mob.        7/12/19: R knee extension: -7*; R knee flexion: 112*  7/15/19: R knee extension: -7: R knee flexion 114*  7/24/19: Patient ascended/descended 18 steps with HR and SC with a step to gt pattern and with VC for hand and foot placement and with a noted decrease in eccentric control of R knee.     8/5/19 R knee extension -6 following cybex stretch   8/19/19: R knee flexion measured in sitting 118* extension not measured    8/23/19 AROM R knee -5 deg to 115,  PROM to 120 flexion.     Patient ascended/descended 18 steps with HR  with a step to gt pattern and with VC for hand and foot placement,with a noted decrease in eccentric control of R knee.          MMT      Right Knee ext 4/5       Patient received the following supervised modalities after being cleared for contradictions: TENS:  Sabrina received TENS electrical stimulation for pain to the R knee x 10 min. DNP   Sabrina tolerated treatment well without any adverse effects.       Assessment     Pt tolerated therapy session well with good demonstration of quad control and ROM. Pt continues to demonstrate gait deficits and will continue to benefit from skilled PT to address knee deficits and gait impairments.     Sabrina is progressing well towards her goals.   Pt prognosis is Good.     Pt will continue to benefit from skilled outpatient physical therapy to address the deficits listed in the problem list box on initial evaluation, provide pt/family education and to maximize pt's level of independence in the home and community environment.     Pt's spiritual, cultural and educational needs considered and pt agreeable to  plan of care and goals.     Anticipated barriers to physical therapy: N/A    Goals:     Short Term Goals  Status Goal   Met 7/8/19 1. Patient will report consistently elevating her R LE and performing ankle pumps to decrease swelling within 4 visits.   Met 7/8/19 2. Patient will demonstrate ability to perform proper setup for STS from chair to improve transfer ability at home within 6 visits.   Met 7/8/19 3. Patient will be independent with initial HEP for R LE knee strength and ROM to improve therapy outcomes within 8 visits.   Long Term Goals  Status Goal   In Progress 9/4/2019      4. Patient will report R LE pain at worst as </=4/10 to improve QoL within 24 visits.   Met 7/24/19 5. Patient will improve LEFS score from 12/80 to >/=40/80 to demonstrate an improvement in functional status within 24 visits.   In Progress 9/4/2019     6. Patient will improve R LE knee extension AROM to 0 degrees to improve ability to perform proper heel strike when ambulating within 24 visits.   In Progress 9/4/2019     7. Patient will demonstrate ability to ascend/descend 18 steps in PT gym with step through gait pattern and UE handrail assist to improve community ambulatory ability within 24 visits.   In Progress 9/4/2019     8. Patient will improve R LE knee strength to >/=4+/5 to improve ability to safely perform sit to stand transfers within 24 visits.         Plan     Cont with current POC prog resistance as andrei and return to Patellar glides.        Randa Houston, PT

## 2019-09-04 NOTE — PROGRESS NOTES
"  [facility] Outpatient Occupational Therapy Daily Treatment Note       Date: 9/4/2019  Name: Sabrina Black  Clinic Number: [id number]    Therapy Diagnosis: Bilateral wrist pain  Physician: Lee Ann Davis NP    Physician Orders: Eval and treat  Medical Diagnosis: bilateral carpal tunnel syndrome  Surgical Procedure and Date: N/A  Eval Date: 8/05/2019       8/28   Insurance Authorization Period Expiration: 8/07/2019-9/27/2019  Plan of Care Certification Period: 8/05/2019-9/27/2019    Date of Return to MD: TBD    Visit # / Visits authorized:7/12  Time In:9:03  Time Out: 10:00  Total Billable Time: 53 minutes    Precautions:  Standard, diabetes    Subjective     Pt reports:"Still having tingling in the (R) forearm (volar and dorsal aspects)."     Objective    Patient received the following treatment:       Therapeutic activities to improve functional performance with use of dynamic activities for 53 mins  Including:    NP=NOT PERFORMED  NP-Ultrasound  for pain control and to increase tissue elasticity @ 50 duty cycle, 3 Mhz, applied to right carpal tunnel area, intensity =0.8 w/cm2 decreased to 0.6 for 8 minutes right hand  Soft tissue massage to Carpal tunnel area and stretches to transverse carpal ligaments  Massage and stretch of forearm flexors right and left  to increase tissue elasticity and decrease pain  Joint blocking of PIPs and DIPs flexion each 10 reps to glide through tunnel each hand  Free weight: wrist 2# flexion, extension and deviations 30 each R and L  Theraputty  GREEN gripping and pinching  R and L  Median n glides both hands x 10 reps each with therapist assist to maintain wrist ext as supinated  Flexbar-GREEN simulated towel wringing and screw top turning  20 reps each  Graded clothespins-all on/off container rim YELLOW to BLACK total of 20 clothespins  Patient required moderate cuing for correct performance of ex    Assessment   Patient reporting continues with tingling " throughout right forearm.  No pain indicated or change in symptoms with massage, glides or ex.    Plan   Continue skilled therapy 2 times a week with ther ex and activities, manual therapy, and pain modalities as needed.       HALIE Bueno

## 2019-09-09 ENCOUNTER — CLINICAL SUPPORT (OUTPATIENT)
Dept: REHABILITATION | Facility: HOSPITAL | Age: 49
End: 2019-09-09
Payer: MEDICAID

## 2019-09-09 DIAGNOSIS — M25.531 RIGHT WRIST PAIN: Primary | ICD-10-CM

## 2019-09-09 DIAGNOSIS — R26.9 GAIT ABNORMALITY: ICD-10-CM

## 2019-09-09 DIAGNOSIS — M25.561 RIGHT KNEE PAIN, UNSPECIFIED CHRONICITY: Primary | ICD-10-CM

## 2019-09-09 DIAGNOSIS — M25.532 LEFT WRIST PAIN: ICD-10-CM

## 2019-09-09 PROCEDURE — 97530 THERAPEUTIC ACTIVITIES: CPT

## 2019-09-09 PROCEDURE — 97110 THERAPEUTIC EXERCISES: CPT

## 2019-09-09 NOTE — PROGRESS NOTES
"  [facility] Outpatient Occupational Therapy Daily Treatment Note       Date: 9/9/2019  Name: Sabrina Black  Clinic Number: [id number]    Therapy Diagnosis: Bilateral wrist pain  Physician: Lee Ann Davis NP    Physician Orders: Eval and treat  Medical Diagnosis: bilateral carpal tunnel syndrome  Surgical Procedure and Date: N/A  Eval Date: 8/05/2019       8/28   Insurance Authorization Period Expiration: 8/07/2019-9/27/2019  Plan of Care Certification Period: 8/05/2019-9/27/2019    Date of Return to MD: TBD    Visit # / Visits authorized:8/12  Time In:10:03  Time Out: 11:00  Total Billable Time:30 minutes    Precautions:  Standard, diabetes    Subjective     Pt reports:"Tingling in my right arm is still pretty bad.  Hands really aren't hurting.'    Objective    Patient received the following treatment:       Therapeutic activities to improve functional performance with use of dynamic activities for 30 mins  Including:    NP=NOT PERFORMED  NP-Ultrasound  for pain control and to increase tissue elasticity @ 50 duty cycle, 3 Mhz, applied to right carpal tunnel area, intensity =0.8 w/cm2 decreased to 0.6 for 8 minutes right hand  NP-Joint blocking of PIPs and DIPs flexion each 10 reps to glide through tunnel each hand    Soft tissue massage to Carpal tunnel area and stretches to transverse carpal ligaments  Massage and stretch of forearm flexors right and left  to increase tissue elasticity and decrease pain  Free weight: wrist 3# flexion, extension and deviations 30 each R and L  Theraputty  GREEN gripping and pinching  R and L :cookie cutting" and screw top turning  Median n glides both hands x 10 reps each with therapist assist to maintain wrist ext as supinated  Flexbar-GREEN simulated towel wringing and screw top turning  20 reps each  Graded clothespins-all on/off container rim YELLOW to BLACK total of 20 clothespins x 2    Patient required moderate cuing for correct performance of " "ex    Assessment   Patient noting no pain, tingling persisting. Patient performed all ex with no indication of increase in symptoms. Massage of forearm "feels good".    Plan   Continue skilled therapy 2 times a week with ther ex and activities, manual therapy, and pain modalities as needed.       HALIE Bueno  "

## 2019-09-09 NOTE — PROGRESS NOTES
"  Physical Therapy Daily Treatment Note     Name: Sabrina Black  Clinic Number: [id number]    Therapy Diagnosis:   Encounter Diagnoses   Name Primary?    Right knee pain, unspecified chronicity     Gait abnormality      Physician: Alfredo Blanc MD    Visit Date: 9/9/2019    Physician Orders: PT EVAL AND RX  Medical Diagnosis: OA S/P R TKA  Evaluation Date: 6/10/19  Authorization Period Expiration: 12 visits from 9/3/19-10/03/2019  Plan of Care Certification Period: 10/11/19 (Monthly Progress Note due 9/23/19)  Visit #/Visits authorized: 2/12   PTA visit: 0/5    Time In: 0900  Time Out: 0957  Total Billable Time: 51 minutes    Precautions: Standard    Subjective     Pt reports No new complaints on todays date.     Response to previous treatment: good   Functional change: Reports improvement in ADL's.     Pain: 3 /10  Location: right knee      Objective     Sabrina received therapeutic exercises to develop ROM, strength, activity total for 48 minutes including:    ·  Cardio -  Recumbent Bike 10 minutes R:4  ·   Ankle - Gastroc stretch - slant board: 30"x3     Hip - Hamstring Stretch (Seated): 3 x 30second hold DNP     Ankle - Heel Raises: 3 sets of 15    Step ups on step with 2 riser x 10 ea LE lead (for con    Standing Hip Abduction 3 x 10 ea LE (with #4 AW) (DNP)    Mini squats 3x10 reps    LAQ 15 x 2 with 5 second hold at top 4# AW  ·   Knee - Flexion - heel slide (rope): 2 sets of 15 with 10 second holds (DNP)  ·   Knee - SLR bolster under ankle: 2 x 15 reps with 3-second holds with 4lb AW over knee on RLE; 3# AW on RLE  ·   Knee - Terminal Knee Extension:  Large Bolster: 3 x15 reps 4# and 3 second holds  DNP  ·   Transfers - Sit to Stand: 15 times with staggered stance/foot placement from [facility] for increased R LE use.  ·   Knee - prone knee hangs: 5 minutes /c 6# AW DNP  ·   Knee - Cybex Leg Press: 2 x 10 reps 4 plates /c 5 sec hold at the bottom for knee flexion stretch      Knee- Cybex HS curl 3 x 10 reps " 3 plates     Knee cybex extension stretch on HS curl. 4 plates 5 minute extended stretch. (DNP)      Manual Therapy:   Patellar Glides 4 ways: x 3 minutes (patellar all directions) This day 9/9/19 performed for 3 minutes      Patient received the following supervised modalities after being cleared for contradictions: TENS:  Sabrina received TENS electrical stimulation for pain to the R knee x 0 min. DNP   Sabrina tolerated treatment well without any adverse effects.       Assessment     Patient participated with Rx to address her ROM, strength activity andrei and functional mob.  She required VC/TC for technique to improve exs quality and performance as well as several recovery periods due to fatigue. She continues with decreased eccentric control to right knee.  She is expected to cont to progress with with PT RX in order to obtain her established goals and optiize her functional mob.        Sabrina is progressing well towards her goals.   Pt prognosis is Good.       Pt's spiritual, cultural and educational needs considered and pt agreeable to plan of care and goals.     Anticipated barriers to physical therapy: N/A    Goals:     Short Term Goals  Status Goal   Met 7/8/19 1. Patient will report consistently elevating her R LE and performing ankle pumps to decrease swelling within 4 visits.   Met 7/8/19 2. Patient will demonstrate ability to perform proper setup for STS from chair to improve transfer ability at home within 6 visits.   Met 7/8/19 3. Patient will be independent with initial HEP for R LE knee strength and ROM to improve therapy outcomes within 8 visits.   Long Term Goals  Status Goal   In Progress 9/9/2019      4. Patient will report R LE pain at worst as </=4/10 to improve QoL within 24 visits.   Met 7/24/19 5. Patient will improve LEFS score from 12/80 to >/=40/80 to demonstrate an improvement in functional status within 24 visits.   In Progress 9/9/2019     6. Patient will improve R LE knee extension AROM  to 0 degrees to improve ability to perform proper heel strike when ambulating within 24 visits.   In Progress 9/9/2019     7. Patient will demonstrate ability to ascend/descend 18 steps in PT gym with step through gait pattern and UE handrail assist to improve community ambulatory ability within 24 visits.   In Progress 9/9/2019     8. Patient will improve R LE knee strength to >/=4+/5 to improve ability to safely perform sit to stand transfers within 24 visits.         Plan     Cont with current POC prog resistance as andrei.         Michael Raza, PT

## 2019-09-11 ENCOUNTER — CLINICAL SUPPORT (OUTPATIENT)
Dept: REHABILITATION | Facility: HOSPITAL | Age: 49
End: 2019-09-11
Payer: MEDICAID

## 2019-09-11 DIAGNOSIS — M25.532 LEFT WRIST PAIN: ICD-10-CM

## 2019-09-11 DIAGNOSIS — M25.531 RIGHT WRIST PAIN: Primary | ICD-10-CM

## 2019-09-11 PROCEDURE — 97530 THERAPEUTIC ACTIVITIES: CPT

## 2019-09-11 NOTE — PROGRESS NOTES
"  [facility] Outpatient Occupational Therapy Monthly Progress/ Treatment Note       Date: 9/11/2019  Name: Sabrina Black  Clinic Number: [id number]    Therapy Diagnosis: Bilateral wrist pain  Physician: Lee Ann Davis NP    Physician Orders: Eval and treat  Medical Diagnosis: bilateral carpal tunnel syndrome  Surgical Procedure and Date: N/A  Eval Date: 8/05/2019       8/28       [place] 9/11/2019   Insurance Authorization Period Expiration: 8/07/2019-9/27/2019  Plan of Care Certification Period: 8/05/2019-9/27/2019    Date of Return to MD: TBD    Visit # / Visits authorized:9/12  Time In:8:00  Time Out: 9:00  Total Billable Time:53 minutes    Precautions:  Standard, diabetes    Subjective     Pt reports:"Tingling in my right arm -indicating throughout forearm into radial fingers volar and dorsal aspects.  Hand/arm gets weak with activities.  Had to stop watering my plants yesterday.  My (R) fingers started tingling."    Functional Limitations/Social History:   By patient report:  Previous functional status includes: Independent self care and household tasks   Current Functional Status              ADLs/IADLs:                           - Feeding:independent at times some difficulty with holding utensil-dropping utensils occasionally                          - Bathing/Grooming: increase in symptoms with washing hair -weakness in right upper extremity                          - Dressing/Grooming: doing okay with fasteners, with blow drying hair noting arms get weak                          - Household tasks:  Unable to maintain hold on handles i.e broom  and dropping dishes as washing them, unable to lift anything of weight and much difficulty opening containers                          - Leisure: Needlework/Sewing-quilting- can only do a little at a time     Quick DASH:  8/05/2019 -40 disability/symptom score     9/11/209- 40 disability/symptom score     Objective   Observation:  No swelling or atrophy " noted throughout both hands  Palpation: mild tenderness at mid dorsal wrist    Fingers AROM -full fist and fingers extension full bilaterally                   AROM            8/05/2019 9/11/2019  Wrist:            Right      Left            Right     Left    Flexion          50          50               55         55    Extension     60          60               75          65    UD                30          30               30         30    RD                20          20               20         20     MMT: 8/05/2019 and 9/11/2019  Fingers and thumb  all flexors 5/5 and wrist flexors and extensors 5/5.                                               8/05/2019 9/11/2019   (in lbs) Trial   1            2        3          Average              Trial  1            2             3         Average    Right                    40         50       40            43                               50          45           52           49    Left                      34         30       35            33                               42           38          35           38     Pinches (in psi)  Ave of 2 trials                                        8/05/2019 9/11/2019                                      Right         Left                 Right        Left    Lateral                     12             13                  12.5            13    3 jaw beti              9               8.5                 9.5             10.5    Tip                            7.5            7                     9.5              8    Edema:  3 cm below elbow crease    Right   29 cm          Left   27.3 cm                 5 cm above wrist crease     Right   27.2 cm        Left   26.5  cm    Patient received the following treatment:       Therapeutic activities to improve functional performance with use of dynamic activities for 53 mins  Including:     "NP=NOT PERFORMED  NP-Joint blocking of PIPs and DIPs flexion each 10 reps to glide through tunnel each hand    -Ultrasound  for pain control and to increase tissue elasticity @ 50 duty cycle, 3 Mhz, applied to right carpal tunnel area, intensity =0.8 w/cm2 decreased to 0.6 for 8 minutes right hand  -Soft tissue massage to Carpal tunnel area and stretches to transverse carpal ligaments  Massage and stretch of forearm flexors right  to increase tissue elasticity and decrease pain  -Free weight: wrist 3# flexion, extension and deviations 30 each R   ADDED-Kinesio taping of right  With 2" I strip with 2 button holes for long and ring fingers and applied along volar aspect of hand and forearm with 20% tension and 2" I strip with 50% tension around volar     aspect of wrist to decrease inflammation and tension at wrist.    NP-Theraputty  GREEN gripping and pinching  R and L :cookie cutting" and screw top turning  NP-Median n glides both hands x 10 reps each with therapist assist to maintain wrist ext as supinated  NP-Flexbar-GREEN simulated towel wringing and screw top turning  20 reps each  NP-Graded clothespins-all on/off container rim YELLOW to BLACK total of 20 clothespins x 2    Patient required moderate cuing for correct performance of ex    Home program instruction:  K tape care (bathe and pat dry), wear (up to 5 days), precautions and removal (remove immediately if any signs of irritation, increase in pain or swelling by thoroughly wetting with soap and water or baby oil and roll back on itself). Patient expressed good knowledge of instructions given with no assistance.    Assessment   Patient noting tingling persisting at right forearm into radial fingers, which at times requires her to stop activity at hand. Her right wrist extension improved by 15 dgs and with increases of 5 dgs in R and L in other planes.  Her right  increased 6# and 5# in left.  Her pinches increased .5 to 2#.  She has swelling throughout " right forearm with skin tightness as compared to the left. Quick DASH score with no change, continues with moderate limitations.  Patient will benefit from continuing with skilled OT to further address symptoms hindering daily activities.    Plan   Continue skilled therapy 2 times a week with ther ex and activities, manual therapy, and pain modalities as needed.       HALIE Bueno

## 2019-09-16 ENCOUNTER — CLINICAL SUPPORT (OUTPATIENT)
Dept: REHABILITATION | Facility: HOSPITAL | Age: 49
End: 2019-09-16
Payer: MEDICAID

## 2019-09-16 DIAGNOSIS — M25.561 RIGHT KNEE PAIN, UNSPECIFIED CHRONICITY: ICD-10-CM

## 2019-09-16 DIAGNOSIS — R26.9 GAIT ABNORMALITY: ICD-10-CM

## 2019-09-16 DIAGNOSIS — M25.532 LEFT WRIST PAIN: ICD-10-CM

## 2019-09-16 DIAGNOSIS — M25.531 RIGHT WRIST PAIN: Primary | ICD-10-CM

## 2019-09-16 PROCEDURE — 97530 THERAPEUTIC ACTIVITIES: CPT

## 2019-09-16 PROCEDURE — 97110 THERAPEUTIC EXERCISES: CPT

## 2019-09-16 NOTE — PROGRESS NOTES
"  Physical Therapy Daily Treatment Note     Name: Sabrina Black  Clinic Number: [id number]    Therapy Diagnosis:   Encounter Diagnoses   Name Primary?    Right knee pain, unspecified chronicity     Gait abnormality      Physician: Alfredo Blanc MD    Visit Date: 9/16/2019    Physician Orders: PT EVAL AND RX  Medical Diagnosis: OA S/P R TKA  Evaluation Date: 6/10/19  Current Plan of Care: 8/26/19- 10/11/19  Authorization Period Expiration: 12 visits from 9/3/19-10/03/2019  Plan of Care Certification Period: 10/11/19   Visit #/Visits authorized: 3/12   PTA visit: 1/5    Monthly Progress Note due 9/23/19    Time In: 0900  Time Out: 1000  Total Billable Time: 30 minutes    Precautions: Standard    Subjective     Pt reports No new complaints on todays date.     Response to previous treatment: good   Functional change: Reports improvement in ADL's.     Pain: 3 /10  Location: right knee      Objective     Sabrina received therapeutic exercises to develop ROM, strength, activity total for 48 minutes including:    ·  Cardio -  Recumbent Bike 10 minutes R:4  ·   Ankle - Gastroc stretch - slant board: 30"x3     Hip - Hamstring Stretch (Seated): 3 x 30second hold DNP     Ankle - Heel Raises: 3 sets of 15    Step ups on step with 2 riser x 10 ea LE lead (for con    Standing Hip Abduction 3 x 10 ea LE Pink TB    Mini squats 3x10 reps    LAQ 15 x 3 with 5 second hold at top 4# AW  ·   Knee - Flexion - heel slide (rope): 2 sets of 15 with 10 second holds (DNP)  ·   Knee - SLR bolster under ankle: 2 x 15 reps with 3-second holds with 4lb AW over knee on RLE; 3# AW on RLE  ·   Knee - Terminal Knee Extension:  Large Bolster: 3 x15 reps 4# and 3 second holds  DNP  ·   Transfers - Sit to Stand: 15 times with staggered stance/foot placement from [facility] for increased R LE use.  ·   Knee - prone knee hangs: 5 minutes /c 6# AW DNP  ·   Knee - Cybex Leg Press: 2 x 10 reps 4 plates /c 5 sec hold at the bottom for knee flexion stretch      " Knee- Cybex HS curl 3 x 10 reps 3 plates     Knee cybex extension stretch on HS curl. 4 plates 5 minute extended stretch. (DNP)    Prone knee extension stretch x 5 minutes /c STM to Hamstrings    - prone hamstring curl during prone knee extension 2 x 10 reps (each set performed after 1 minute stretch)    R knee AROM: -6 degrees of extension 119 degrees of flexion  Passive R knee extension 0 degrees.    Manual Therapy:   Patellar Glides 4 ways: x 3 minutes (patellar all directions)       Patient received the following supervised modalities after being cleared for contradictions: TENS:  Sabrina received TENS electrical stimulation for pain to the R knee x 0 min. DNP   Sabrina tolerated treatment well without any adverse effects.       Assessment     Due to continued lack of active extension PTA performed STM during prone knee hangs. During STM pt noted /c moderate tissue dysfunction and increased tightness. Pt verbalized improvement in tightness post STM. Pt will continue to benefit from skilled PT to improve R knee extension to allow pt to perform adequate heel strike when ambulating.     Sabrina is progressing well towards her goals.   Pt prognosis is Good.       Pt's spiritual, cultural and educational needs considered and pt agreeable to plan of care and goals.     Anticipated barriers to physical therapy: N/A    Goals:     Short Term Goals  Status Goal   Met 7/8/19 1. Patient will report consistently elevating her R LE and performing ankle pumps to decrease swelling within 4 visits.   Met 7/8/19 2. Patient will demonstrate ability to perform proper setup for STS from chair to improve transfer ability at home within 6 visits.   Met 7/8/19 3. Patient will be independent with initial HEP for R LE knee strength and ROM to improve therapy outcomes within 8 visits.   Long Term Goals  Status Goal   In Progress 9/16/2019      4. Patient will report R LE pain at worst as </=4/10 to improve QoL within 24 visits.   Met 7/24/19  5. Patient will improve LEFS score from 12/80 to >/=40/80 to demonstrate an improvement in functional status within 24 visits.   In Progress 9/16/2019     6. Patient will improve R LE knee extension AROM to 0 degrees to improve ability to perform proper heel strike when ambulating within 24 visits.   In Progress 9/16/2019     7. Patient will demonstrate ability to ascend/descend 18 steps in PT gym with step through gait pattern and UE handrail assist to improve community ambulatory ability within 24 visits.   In Progress 9/16/2019     8. Patient will improve R LE knee strength to >/=4+/5 to improve ability to safely perform sit to stand transfers within 24 visits.         Plan     Cont with current POC prog resistance as andrei.         Heather Molina, PTA

## 2019-09-16 NOTE — PROGRESS NOTES
"  [facility] Outpatient Occupational Therapy Daily Treatment Note       Date: 9/16/2019  Name: Sabrina Black  Clinic Number: [id number]    Therapy Diagnosis: Bilateral wrist pain  Physician: Lee Ann Davis NP    Physician Orders: Eval and treat  Medical Diagnosis: bilateral carpal tunnel syndrome  Surgical Procedure and Date: N/A  Eval Date: 8/05/2019       8/28       [place] 9/11/2019   Insurance Authorization Period Expiration: 8/07/2019-9/27/2019  Plan of Care Certification Period: 8/05/2019-9/27/2019    Date of Return to MD: TBD    Visit # / Visits authorized:10/12  Time In:8:02  Time Out: 9:00  Total Billable Time:54 minutes    Precautions:  Standard, diabetes-stated not longer has    Subjective     Pt reports: "Wore wrist splints last night. Didn't have the numbness but had the tingling in the right arm (entire forearm)."   Didn't had the tape on long to be able to tell if did any good.  Had the nerve test- moderate carpal tunnel in both hands.    Objective   Palpation: mild tenderness at right mid dorsal wrist    Patient received the following treatment:       Therapeutic activities to improve functional performance with use of dynamic activities for 54 mins  Including:    NP=NOT PERFORMED  -Joint blocking of PIPs and DIPs flexion each 10 reps to glide through tunnel each hand  -Ultrasound  for pain control and to increase tissue elasticity @ 50 duty cycle, 3 Mhz, applied to right carpal tunnel area, intensity =0.8 w/cm2 decreased to 0.6 for 8 minutes right hand  -Soft tissue massage to Carpal tunnel area and stretches to transverse carpal ligaments  Massage and stretch of forearm flexors right  to increase tissue elasticity and decrease pain  -Free weight: wrist 3# flexion, extension and deviations 30 each R   -Theraputty  GREEN gripping and pinching  R and L :cookie cutting" and screw top turning  -Median n glides both hands x 10 reps each with therapist assist to maintain wrist ext as " supinated    NP-Flexbar-GREEN simulated towel wringing and screw top turning  20 reps each  NP-Graded clothespins-all on/off container rim YELLOW to BLACK total of 20 clothespins x 2    Patient required moderate cuing for correct performance of ex      Assessment   Patient noting tingling persisting at right forearm.  Patient noting has moderate carpal tunnel in both hands per nerve conduction study.  She notes wearing splints has reduced symptoms at night in her hands, however, tingling at right forearm persisted.. She was encouraged to wear splints as much as possible during the day.     Plan   Continue skilled therapy 2 times a week with ther ex and activities, manual therapy, and pain modalities as needed.       HALIE Bueno

## 2019-09-17 DIAGNOSIS — Z96.651 S/P TOTAL KNEE REPLACEMENT, RIGHT: Primary | ICD-10-CM

## 2019-09-17 RX ORDER — SOLIFENACIN SUCCINATE 5 MG/1
TABLET, FILM COATED ORAL
Qty: 30 TABLET | Refills: 6 | Status: SHIPPED | OUTPATIENT
Start: 2019-09-17 | End: 2020-04-14 | Stop reason: SDUPTHER

## 2019-09-17 RX ORDER — HYDROCODONE BITARTRATE AND ACETAMINOPHEN 5; 325 MG/1; MG/1
1 TABLET ORAL EVERY 8 HOURS PRN
Qty: 21 TABLET | Refills: 0 | Status: SHIPPED | OUTPATIENT
Start: 2019-09-17 | End: 2019-09-24

## 2019-09-17 NOTE — TELEPHONE ENCOUNTER
----- Message from Raeann Del Real sent at 9/17/2019 11:06 AM CDT -----  Contact: patient   Pt called and is asking for a refill on her pain medication- hydrocodon 5-325, last refill was on 9/3.    PTS # [phone]

## 2019-09-18 ENCOUNTER — CLINICAL SUPPORT (OUTPATIENT)
Dept: REHABILITATION | Facility: HOSPITAL | Age: 49
End: 2019-09-18
Payer: MEDICAID

## 2019-09-18 DIAGNOSIS — R26.9 GAIT ABNORMALITY: ICD-10-CM

## 2019-09-18 DIAGNOSIS — M25.532 LEFT WRIST PAIN: ICD-10-CM

## 2019-09-18 DIAGNOSIS — M25.561 RIGHT KNEE PAIN, UNSPECIFIED CHRONICITY: Primary | ICD-10-CM

## 2019-09-18 DIAGNOSIS — M25.531 RIGHT WRIST PAIN: Primary | ICD-10-CM

## 2019-09-18 PROCEDURE — 97530 THERAPEUTIC ACTIVITIES: CPT

## 2019-09-18 PROCEDURE — 97110 THERAPEUTIC EXERCISES: CPT

## 2019-09-18 NOTE — PROGRESS NOTES
"  [facility] Occupational Therapy Daily Treatment Note       Date: 9/18/2019  Name: Sabrina Black  Clinic Number: [id number]    Therapy Diagnosis: Bilateral wrist pain  Physician: Lee Ann Davis NP    Physician Orders: Eval and treat  Medical Diagnosis: bilateral carpal tunnel syndrome  Surgical Procedure and Date: N/A  Eval Date: 8/05/2019       8/28       [place] 9/11/2019   Insurance Authorization Period Expiration: 8/07/2019-9/27/2019  Plan of Care Certification Period: 8/05/2019-9/27/2019    Date of Return to MD: TBD    Visit # / Visits authorized:11/12  Time In:10:02  Time Out: 11:00  Total Billable Time:54 minutes    Precautions:  Standard, diabetes-stated not longer has    Subjective   Patient reports: "Wearing the splints.  Forearm isn't as numbish and didn't start tingling as quickly when I was watering my plants...Still dropping some things."    Objective     Patient received the following treatment:       Therapeutic activities to improve functional performance with use of dynamic activities for 54 mins  Including:    NP=NOT PERFORMED  -Joint blocking of PIPs and DIPs flexion each 10 reps to glide through tunnel each hand  -Ultrasound  for pain control and to increase tissue elasticity @ 50 duty cycle, 3 Mhz, applied to right carpal tunnel area, intensity =0.8 w/cm2 decreased to 0.8 for 8 minutes right hand  -Soft tissue massage to Carpal tunnel area and stretches to transverse carpal ligaments  Massage and stretch of forearm flexors right  to increase tissue elasticity and decrease pain  NP-Free weight: wrist 3# flexion, extension and deviations 30 each R   -Theraputty  GREEN gripping and pinching  R and L :cookie cutting" and screw top turning  -Median n glides both hands x 10 reps each with therapist assist to maintain wrist ext as supinated  -Flexbar-GREEN simulated towel wringing and screw top turning  20 reps each  -Graded clothespins-all on/off container rim YELLOW to BLACK " total of 20 clothespins x 2    Patient required minimal cuing for correct performance of ex    Assessment   Patient noting a decrease in right forearm tingling and decrease in frequency of numbness in her hand. Symptoms resolved on the left. No increase in symptoms noted with ex and massage.     Plan   Reassess bilateral hand status and discuss treatment plan (cont or discharge).       HALIE Bueno

## 2019-09-18 NOTE — PROGRESS NOTES
"  Physical Therapy Daily Treatment Note     Name: Sabrina Black  Clinic Number: [id number]    Therapy Diagnosis:   Encounter Diagnoses   Name Primary?    Right knee pain, unspecified chronicity     Gait abnormality      Physician: Alfredo Blanc MD    Visit Date: 9/18/2019    Physician Orders: PT EVAL AND RX  Medical Diagnosis: OA S/P R TKA  Evaluation Date: 6/10/19  Current Plan of Care: 8/26/19- 10/11/19  Authorization Period Expiration: 12 visits from 9/3/19-10/03/2019  Plan of Care Certification Period: 10/11/19   Visit #/Visits authorized: 4/12   PTA visit: 0/5    Monthly Progress Note due 9/23/19    Time In: 1100  Time Out: 1205  Total Billable Time: 58 minutes    Precautions: Standard    Subjective     Pt reports Patient amb into cliinc and relates 3/10 pain/discomfort to R knee.      Response to previous treatment: good   Functional change: Reports improvement in ADL's.     Pain: 3 /10  Location: right knee      Objective     Sabrina received therapeutic exercises to develop ROM, strength, activity total for 53 minutes including:    ·  Cardio -  Recumbent Bike 10 minutes R:4  ·   Ankle - Gastroc stretch - slant board: 30"x3     Hip - Hamstring Stretch (Seated): 3 x 30second hold DNP     Ankle - Heel Raises: 3 sets of 15    Step ups on step with 2 riser x 10 ea LE lead (for concentric contraction)    Standing Hip Abduction 3 x 10 ea LE PTB    Mini squats 3x10 reps    LAQ 15 x 3 with 5 second hold at top 4# AW  ·   Knee - Flexion - heel slide (rope): 2 sets of 15 with 10 second holds (DNP)  ·   Knee - SLR bolster under ankle: 3 x 15 reps with 3-second holds with 5lb AW over knee on RLE; 4# AW on LLE  ·   Knee - Terminal Knee Extension:  Large Bolster: 3 x15 reps 4# and 3 second holds  (DNP)  ·   Transfers - Sit to Stand: 15 times with staggered stance/foot placement from int for increased R LE use.  ·   Knee - prone knee hangs: 5 minutes /c 6# AW (DNP)  ·   Knee - Cybex Leg Press: 2 x 10 reps 4 plates /c " 5 sec hold at the bottom for knee flexion stretch      Knee- Cybex HS curl 3 x 10 reps 4 plates     Knee cybex extension stretch on HS curl. 4 plates 5 minute extended stretch. (DNP)    Prone knee extension stretch x 5 minutes /c STM to Hamstrings    - prone hamstring curl during prone knee extension 2 x 10 reps (each set performed after 1 minute stretch)    R knee AROM: -6 degrees of extension 119 degrees of flexion  Passive R knee extension 0 degrees.    Manual Therapy:   Patellar Glides 4 ways: x 5 minutes (patellar all directions)       Patient received the following supervised modalities after being cleared for contradictions: TENS:  Sabrina received TENS electrical stimulation for pain to the R knee x 0 min. DNP   Sabrina tolerated treatment well without any adverse effects.       Assessment     Patient cont to participate with Rx to address her ROM, strength and act. Andrei.  She required frequent VC/TC for improved exs quality and to target increased R knee ext.  Additionally, she did cont with with hamstring tightness to Right LE and andrei STM to improve ROM to R knee and promote a more normal gt.  She is expected to cont to progress and obtain her established goals with cont Rx.        Sabrina is progressing well towards her goals.   Pt prognosis is Good.       Pt's spiritual, cultural and educational needs considered and pt agreeable to plan of care and goals.     Anticipated barriers to physical therapy: N/A    Goals:     Short Term Goals  Status Goal   Met 7/8/19 1. Patient will report consistently elevating her R LE and performing ankle pumps to decrease swelling within 4 visits.   Met 7/8/19 2. Patient will demonstrate ability to perform proper setup for STS from chair to improve transfer ability at home within 6 visits.   Met 7/8/19 3. Patient will be independent with initial HEP for R LE knee strength and ROM to improve therapy outcomes within 8 visits.   Long Term Goals  Status Goal   In Progress  9/18/2019      4. Patient will report R LE pain at worst as </=4/10 to improve QoL within 24 visits.   Met 7/24/19 5. Patient will improve LEFS score from 12/80 to >/=40/80 to demonstrate an improvement in functional status within 24 visits.   In Progress 9/18/2019     6. Patient will improve R LE knee extension AROM to 0 degrees to improve ability to perform proper heel strike when ambulating within 24 visits.   In Progress 9/18/2019     7. Patient will demonstrate ability to ascend/descend 18 steps in PT gym with step through gait pattern and UE handrail assist to improve community ambulatory ability within 24 visits.   In Progress 9/18/2019     8. Patient will improve R LE knee strength to >/=4+/5 to improve ability to safely perform sit to stand transfers within 24 visits.         Plan     Cont with current POC prog resistance as andrei.         Michael Raza PT

## 2019-09-23 ENCOUNTER — CLINICAL SUPPORT (OUTPATIENT)
Dept: REHABILITATION | Facility: HOSPITAL | Age: 49
End: 2019-09-23
Payer: MEDICAID

## 2019-09-23 DIAGNOSIS — M25.561 RIGHT KNEE PAIN, UNSPECIFIED CHRONICITY: Primary | ICD-10-CM

## 2019-09-23 DIAGNOSIS — R26.9 GAIT ABNORMALITY: ICD-10-CM

## 2019-09-23 PROCEDURE — 97750 PHYSICAL PERFORMANCE TEST: CPT

## 2019-09-23 PROCEDURE — 97110 THERAPEUTIC EXERCISES: CPT

## 2019-09-23 NOTE — PLAN OF CARE
"  Physical Therapy Monthly Assessemt/ Treatment Note     Name: Sabrina Black  Clinic Number: [id number]    Therapy Diagnosis:   Encounter Diagnoses   Name Primary?    Right knee pain, unspecified chronicity     Gait abnormality      Physician: Alfredo Blanc MD    Visit Date: 9/23/2019    Physician Orders: PT EVAL AND RX  Medical Diagnosis: OA S/P R TKA  Evaluation Date: 6/10/19  Current Plan of Care: 8/26/19- 10/11/19  Authorization Period Expiration: 12 visits from 9/3/19-10/03/2019  Plan of Care Certification Period: 10/11/19   Visit #/Visits authorized: 5/12   PTA visit: 0/5    Monthly Assessment performed on 9/23/19, next follow up due on or before 10/23/19    Time In: 0700  Time Out: 0805  Total Billable Time: 55 minutes    Precautions: Standard    Subjective     Pt reports Patient amb into cliinc and relates 3/10 pain/discomfort to R knee, she relates she has been able to kneel down to floor at home to get her keys out from under her couch.      Response to previous treatment: good   Functional change: Reports improvement in ADL's.     Pain: 3 /10  Location: right knee      Objective     Sabrina received therapeutic exercises to develop ROM, strength, activity total for 43 minutes including:    ·  Cardio -  Recumbent Bike 10 minutes R:4  ·   Ankle - Gastroc stretch - slant board: 30"x3     Hip - Hamstring Stretch (Seated): 3 x 30second hold DNP     Ankle - Heel Raises: 3 sets of 15    Step ups on step with 2 riser x 10 ea LE lead (for concentric contraction)    Standing Hip Abduction 3 x 10 ea LE PTB    Mini squats 3x10 reps    LAQ 15 x 3 with 5 second hold at top 4# AW  ·   Knee - Flexion - heel slide (rope): 2 sets of 15 with 10 second holds (DNP)  ·   Knee - SLR bolster under ankle: 2 x 15 reps with 3-second holds with 5lb AW over knee on RLE; 4# AW on LLE  ·   Knee - Terminal Knee Extension:  Large Bolster: 3 x15 reps 4# and 3 second holds  ·   Transfers - Sit to Stand: 15 times with staggered " stance/foot placement from [facility] for increased R LE use. (DNP)  ·   Knee - prone knee hangs: 5 minutes /c 6# AW   ·   Knee - Cybex Leg Press: 2 x 10 reps 4 plates /c 5 sec hold at the bottom for knee flexion stretch (DNP)  · Knee- Cybex HS curl 3 x 10 reps 4 plates (DNP)     Knee cybex extension stretch on HS curl. 4 plates 5 minute extended stretch. (DNP)    Prone knee extension stretch x 5 minutes /c STM to Hamstrings  ·  prone hamstring curl during prone knee extension 2 x 10 reps (each set performed after 1 minute stretch) (DNP)    R knee AROM: -6 degrees of extension 119 degrees of flexion  Passive R knee extension 0 degrees.    Manual Therapy:   Patellar Glides 4 ways: x 0 minutes (patellar all directions)       Patient received the following supervised modalities after being cleared for contradictions: TENS:  Sabrina received TENS electrical stimulation for pain to the R knee x 0 min. DNP   Sabrina tolerated treatment well without any adverse effects.         Patient participated in dynamic functional therapeutic activities (physical performance testing) to improve functional performance for 12 minutes, including: ROM, MMT, steps and functional testing.      R knee ROM     Flex: 119 Ext: -3 and to 0 deg passively.        R knee MMT     Flex 4/5 Ext 4+/5      Stairs:  Patient ascended/descended 18 steps with HR use and S with 3 foot/step contacts with ascending steps.  Also cont with decreased eccentric control of R knee with descending steps.  She utilized an intermittent step to and step over step strategy and with VC for foot and hand placement.         LEFS: 58/80= 72.5% Function        Assessment     Patient has participtaed with PT to address her ROM, strength activity andrei and functional mob.  She cont to make steady progress and has improvements with her ROM and strength but remains limited with her R knee eccentric control with steps.  Her function has improved to 72.5% as evidenced by the LEFS and she  has met all of her STG and LTG 5.  Her remaining LTG's are in progress.  She cont with exacerbations of pain at worst to a 5/10, decreased active knee extension, and limited functional mob.  Patient is expected to cont to progress to her established LTG and optimize her safe functional [place].          Sabrina is progressing well towards her goals.   Pt prognosis is Good.       Pt's spiritual, cultural and educational needs considered and pt agreeable to plan of care and goals.     Anticipated barriers to physical therapy: N/A    Goals:     Short Term Goals  Status Goal   Met 7/8/19 1. Patient will report consistently elevating her R LE and performing ankle pumps to decrease swelling within 4 visits.   Met 7/8/19 2. Patient will demonstrate ability to perform proper setup for STS from chair to improve transfer ability at home within 6 visits.   Met 7/8/19 3. Patient will be independent with initial HEP for R LE knee strength and ROM to improve therapy outcomes within 8 visits.   Long Term Goals  Status Goal   In Progress 9/23/2019      4. Patient will report R LE pain at worst as </=4/10 to improve QoL within 24 visits.   Met 7/24/19 5. Patient will improve LEFS score from 12/80 to >/=40/80 to demonstrate an improvement in functional status within 24 visits.   In Progress 9/23/2019     6. Patient will improve R LE knee extension AROM to 0 degrees to improve ability to perform proper heel strike when ambulating within 24 visits.   In Progress 9/23/2019     7. Patient will demonstrate ability to ascend/descend 18 steps in PT gym with step through gait pattern and UE handrail assist to improve community ambulatory ability within 24 visits.   In Progress 9/23/2019     8. Patient will improve R LE knee strength to >/=4+/5 to improve ability to safely perform sit to stand transfers within 24 visits.        New LTG 9/23/2019   9. Patient will improve LEFS score from 72.5% to >/= 81% demonstrate an improvement in  functional status by discharge.           Plan     Cont with current POC prog resistance as andrei.  with increased focus on eccentric right knee control.         Michael Raza PT

## 2019-09-23 NOTE — PROGRESS NOTES
"  Physical Therapy Monthly Assessemt/ Treatment Note     Name: Sabrina Black  Clinic Number: [id number]    Therapy Diagnosis:   Encounter Diagnoses   Name Primary?    Right knee pain, unspecified chronicity     Gait abnormality      Physician: Alfredo Blanc MD    Visit Date: 9/23/2019    Physician Orders: PT EVAL AND RX  Medical Diagnosis: OA S/P R TKA  Evaluation Date: 6/10/19  Current Plan of Care: 8/26/19- 10/11/19  Authorization Period Expiration: 12 visits from 9/3/19-10/03/2019  Plan of Care Certification Period: 10/11/19   Visit #/Visits authorized: 5/12   PTA visit: 0/5    Monthly Assessment performed on 9/23/19, next follow up due on or before 10/23/19    Time In: 0700  Time Out: 0805  Total Billable Time: 55 minutes    Precautions: Standard    Subjective     Pt reports Patient amb into cliinc and relates 3/10 pain/discomfort to R knee, she relates she has been able to kneel down to floor at home to get her keys out from under her couch.      Response to previous treatment: good   Functional change: Reports improvement in ADL's.     Pain: 3 /10  Location: right knee      Objective     Sabrina received therapeutic exercises to develop ROM, strength, activity total for 43 minutes including:    ·  Cardio -  Recumbent Bike 10 minutes R:4  ·   Ankle - Gastroc stretch - slant board: 30"x3     Hip - Hamstring Stretch (Seated): 3 x 30second hold DNP     Ankle - Heel Raises: 3 sets of 15    Step ups on step with 2 riser x 10 ea LE lead (for concentric contraction)    Standing Hip Abduction 3 x 10 ea LE PTB    Mini squats 3x10 reps    LAQ 15 x 3 with 5 second hold at top 4# AW  ·   Knee - Flexion - heel slide (rope): 2 sets of 15 with 10 second holds (DNP)  ·   Knee - SLR bolster under ankle: 2 x 15 reps with 3-second holds with 5lb AW over knee on RLE; 4# AW on LLE  ·   Knee - Terminal Knee Extension:  Large Bolster: 3 x15 reps 4# and 3 second holds  ·   Transfers - Sit to Stand: 15 times with staggered " stance/foot placement from [facility] for increased R LE use. (DNP)  ·   Knee - prone knee hangs: 5 minutes /c 6# AW   ·   Knee - Cybex Leg Press: 2 x 10 reps 4 plates /c 5 sec hold at the bottom for knee flexion stretch (DNP)  · Knee- Cybex HS curl 3 x 10 reps 4 plates (DNP)     Knee cybex extension stretch on HS curl. 4 plates 5 minute extended stretch. (DNP)    Prone knee extension stretch x 5 minutes /c STM to Hamstrings  ·  prone hamstring curl during prone knee extension 2 x 10 reps (each set performed after 1 minute stretch) (DNP)    R knee AROM: -6 degrees of extension 119 degrees of flexion  Passive R knee extension 0 degrees.    Manual Therapy:   Patellar Glides 4 ways: x 0 minutes (patellar all directions)       Patient received the following supervised modalities after being cleared for contradictions: TENS:  Sabrina received TENS electrical stimulation for pain to the R knee x 0 min. DNP   Sabrina tolerated treatment well without any adverse effects.         Patient participated in dynamic functional therapeutic activities (physical performance testing) to improve functional performance for 12 minutes, including: ROM, MMT, steps and functional testing.      R knee ROM     Flex: 119 Ext: -3 and to 0 deg passively.        R knee MMT     Flex 4/5 Ext 4+/5      Stairs:  Patient ascended/descended 18 steps with HR use and S with 3 foot/step contacts with ascending steps.  Also cont with decreased eccentric control of R knee with descending steps.  She utilized an intermittent step to and step over step strategy and with VC for foot and hand placement.         LEFS: 58/80= 72.5% Function        Assessment     Patient has participtaed with PT to address her ROM, strength activity andrei and functional mob.  She cont to make steady progress and has improvements with her ROM and strength but remains limited with her R knee eccentric control with steps.  Her function has improved to 72.5% as evidenced by the LEFS and she  has met all of her STG and LTG 5.  Her remaining LTG's are in progress.  She cont with exacerbations of pain at worst to a 5/10, decreased active knee extension, and limited functional mob.  Patient is expected to cont to progress to her established LTG and optimize her safe functional [place].          Sabrina is progressing well towards her goals.   Pt prognosis is Good.       Pt's spiritual, cultural and educational needs considered and pt agreeable to plan of care and goals.     Anticipated barriers to physical therapy: N/A    Goals:     Short Term Goals  Status Goal   Met 7/8/19 1. Patient will report consistently elevating her R LE and performing ankle pumps to decrease swelling within 4 visits.   Met 7/8/19 2. Patient will demonstrate ability to perform proper setup for STS from chair to improve transfer ability at home within 6 visits.   Met 7/8/19 3. Patient will be independent with initial HEP for R LE knee strength and ROM to improve therapy outcomes within 8 visits.   Long Term Goals  Status Goal   In Progress 9/23/2019      4. Patient will report R LE pain at worst as </=4/10 to improve QoL within 24 visits.   Met 7/24/19 5. Patient will improve LEFS score from 12/80 to >/=40/80 to demonstrate an improvement in functional status within 24 visits.   In Progress 9/23/2019     6. Patient will improve R LE knee extension AROM to 0 degrees to improve ability to perform proper heel strike when ambulating within 24 visits.   In Progress 9/23/2019     7. Patient will demonstrate ability to ascend/descend 18 steps in PT gym with step through gait pattern and UE handrail assist to improve community ambulatory ability within 24 visits.   In Progress 9/23/2019     8. Patient will improve R LE knee strength to >/=4+/5 to improve ability to safely perform sit to stand transfers within 24 visits.        New LTG 9/23/2019   9. Patient will improve LEFS score from 72.5% to >/= 81% demonstrate an improvement in  functional status by discharge.           Plan     Cont with current POC prog resistance as andrei.  with increased focus on eccentric right knee control.         Michael Raza PT

## 2019-09-25 ENCOUNTER — CLINICAL SUPPORT (OUTPATIENT)
Dept: REHABILITATION | Facility: HOSPITAL | Age: 49
End: 2019-09-25
Payer: MEDICAID

## 2019-09-25 DIAGNOSIS — M25.561 RIGHT KNEE PAIN, UNSPECIFIED CHRONICITY: Primary | ICD-10-CM

## 2019-09-25 DIAGNOSIS — R26.9 GAIT ABNORMALITY: ICD-10-CM

## 2019-09-25 DIAGNOSIS — M25.532 LEFT WRIST PAIN: ICD-10-CM

## 2019-09-25 DIAGNOSIS — M25.531 RIGHT WRIST PAIN: Primary | ICD-10-CM

## 2019-09-25 PROCEDURE — 97110 THERAPEUTIC EXERCISES: CPT

## 2019-09-25 PROCEDURE — 97530 THERAPEUTIC ACTIVITIES: CPT

## 2019-09-25 NOTE — PLAN OF CARE
"[facility] Monthly/Progress Treatment Note       Date: 9/25/2019  Name: Sabrina Black  Clinic Number: [id number]    Therapy Diagnosis: Bilateral wrist pain  Physician:Yasmine Arenas NP    Physician Orders: Eval and treat  Medical Diagnosis: bilateral carpal tunnel syndrome  Surgical Procedure and Date: N/A  Eval Date: 8/05/2019       [place] 9/25/2019   Insurance Authorization Period Expiration: 8/07/2019-9/27/2019  Plan of Care Certification Period: 8/05/2019-9/27/2019    Date of Return to MD: TBD    Visit # / Visits authorized:12/12  Time In:2:00  Time Out: 3:00  Total Billable Time:55 minutes    Precautions:  Standard, diabetes-stated not longer has    Subjective   Patient reports: "Therapy is helping.  Went to see the carpal tunnel doctor yesterday.  Going to send me to get injections in my wrist.  Dropped a lot of things this morning.  Right -tingling every day.  Not waking up with pain.  Hurts after activity and increases tingling as using it.     Objective     ADLs/IADLs:                           - Feeding:independent at times some difficulty with holding utensil-dropping utensils occasionally                          - Bathing: no difficulty                          - Dressing/Grooming: doing okay with fasteners, better with blow drying hair noting arms get weak                          - Household tasks:  Unable to maintain hold on handles i.e broom  and dropping dishes as washing them, better with lifting of objects with weight and doing better with opening containers                          - Leisure: Needlework/Sewing-quilting- can only do a little at a time     Quick DASH:  8/05/2019 -40 disability/symptom score     9/11/2019- 40 disability/symptom score    9/25/2019= 25 disability/symptom score     Observation:  No swelling or atrophy noted throughout both hands  Palpation:no longer with  mild tenderness at mid dorsal wrist     Fingers AROM -full fist and " fingers extension full bilaterally                   AROM            8/05/2019 9/11/2019 9/25/2019  Wrist:            Right      Left            Right     Left             Right    Left    Flexion          50          50               55         55                55       65    Extension     60          60               75          65               70       75    UD                30          30               30         30                30    RD                20          20               20         20                20     MMT: 8/05/2019 and 9/11/2019  Fingers and thumb  all flexors 5/5 and wrist flexors and extensors 5/5.                                               8/05/2019 9/11/2019 9/25/2019   (in lbs) Trial   1            2        3          Average              Trial  1            2             3         Average                    Trial  1            2             3         Average    Right                    40         50       40            43                               50          45           52           49                                     54          50            50         51    Left                      34         30       35            33                               42           38          35           38                                     42          44            40         42     Pinches (in psi)  Ave of 2 trials                                        8/05/2019 9/11/2019 9/25/2019                                  Right         Left                 Right        Left                  Right        Left     Lateral                     12             13                  12.5            13                      14.5       15.5    3 jaw beti              9               8.5         "         9.5             10.5                   12.5        12.5    Tip                            7.5            7                     9.5              8                         9.5       9.5                                                                                  9/25                   Edema:  3 cm below elbow crease    Right   29 cm          Left   27.3 cm                 5 cm above wrist crease     Right   17.2 cm        Left   17.5  cm      Patient received the following treatment:       Therapeutic activities to improve functional performance with use of dynamic activities for 55 mins  Including:    NP=NOT PERFORMED  NP-Joint blocking of PIPs and DIPs flexion each 10 reps to glide through tunnel each hand  -Ultrasound  for pain control and to increase tissue elasticity @ 50 duty cycle, 3 Mhz, applied to right carpal tunnel area, intensity =0.8 w/cm2 decreased to 0.8 for 8 minutes right hand  -Soft tissue massage to Carpal tunnel area and stretches to transverse carpal ligaments  Massage and stretch of forearm flexors right  to increase tissue elasticity and decrease pain  NP -Theraputty  GREEN gripping and pinching  R and L :cookie cutting" and screw top turning  -Median n glides both hands x 10 reps each with therapist assist to maintain wrist ext as supinated  -Flexbar-GREEN simulated towel wringing and screw top turning  20 reps each  NP-Graded clothespins-all on/off container rim YELLOW to BLACK total of 20 clothespins x 2  NP-Free weight: wrist 3# flexion, extension and deviations 30 each R    Patient required minimal cuing for correct performance of ex  Reassessed ROM, MMT,  and pinch strengths, ADLs and Quick DASH.  Assessment    Patient with decrease in frequency and intensity of right hand tingling/numbness; which continues to occur daily depending on use and continues dropping things frequently.  Left hand with occasional discomfort.  Her wrists AROM improved to grossly WNL.  Her MMT of " wrist mm continues to be 5/5.  Her  strengths have improved, however, right is 14# less than age mean and left is 5# less than age mean.  Her pinch strengths are 2 to 4.5# than age means. She reports improved ability to perform self care and some daily activities.  Her Quick DASH score improved now with minimal limitations noted.     Goals:   Short Term Goals   Goal   Met Improve bilateral  and pinch strengths and decrease numbness in order for patient to be able to perform all grooming with greater ease and with AD as needed in 3 weeks   Met Increase bilateral  and pinch strengths decrease numbness in order for patient to be able to open screw top in 4 weeks   Met Reduce numbness in bilateral hands in order for patient to be able to use knife to cut with greater ease with use of AD as needed in 4 weeks   Met  Met  Met Reduce numbness and improve bilateral hand strength in order for patient to be able to carry grocery bags with greater ease in 4 weeks  Improve pinch strengths and reduce numbness in order for patient to be able to perform fasteners with greater ease and with AD as needed in 3 weeks  Patient independent in home ex program of median n glides and jt blocking of PIP and DIP flexion in 3 visits      Long Term Goals   Goal   In progress Improve score of Quick DASH by 20 points to indicate improved functional status in 6 weeks   In progress Increase bilateral  and pinch strengths, and decrease numbness in order for patient to be able to perform complex meal preparation and household clean up in 6 weeks   In progress Increase right wrist ROM and bilateral  and pinch strengths and decrease pain in order for patient to be to able to reach into refrigerator and cabinets with up to 5 lbs. in 6 weeks   Partially Met  In progress Increase bilateral  strengths by 8# and pinch strengths up to 5 lbs in 6 weeks  Patient reports able to use keyboard and phone for ~ 15 with no increase in  symptoms in 6 weeks       Plan   Send progress note and await response.         HALIE Bueno

## 2019-09-25 NOTE — PROGRESS NOTES
"  Physical Therapy Daily Treatment Note     Name: Sabrina Black  Clinic Number: [id number]    Therapy Diagnosis:   Encounter Diagnoses   Name Primary?    Right knee pain, unspecified chronicity     Gait abnormality      Physician: Alfredo Blanc MD    Visit Date: 9/25/2019    Physician Orders: PT EVAL AND RX  Medical Diagnosis: OA S/P R TKA  Evaluation Date: 6/10/19  Current Plan of Care: 8/26/19- 10/11/19  Authorization Period Expiration: 12 visits from 9/3/19-10/03/2019  Plan of Care Certification Period: 10/11/19   Visit #/Visits authorized: 6/12   PTA visit: 0/5    Monthly Assessment performed on 9/23/19, next follow up due on or before 10/23/19    Time In: 1300  Time Out: 1400  Total Billable Time: 54 minutes    Precautions: Standard    Subjective     Pt reports Patient amb into cliinc and relates 6/10 pain to R knee and states she has been on her feet all day today.      Response to previous treatment: good   Functional change: Reports improvement in ADL's.     Pain:  6/10  Location: right knee      Objective     Sabrina received 1:1 therapeutic exercises to develop ROM, strength, activity total for 54 minutes including:    ·  Cardio -  Recumbent Bike 10 minutes R:4  ·   Ankle - Gastroc stretch - slant board: 30"x3     Hip - Hamstring Stretch (Seated): 3 x 30second hold      Ankle - Heel Raises: 3 sets of 15    Step ups on step with 2 riser x 10 ea LE lead (for concentric/eccentric contraction)    Standing Hip Abduction 3 x 10 ea LE PTB    Mini squats 3x10 reps    LAQ 15 x 3 with 5 second hold at top 4# AW  ·   Knee - Flexion - heel slide (rope): 2 sets of 15 with 10 second holds (DNP)  ·   Knee - SLR bolster under ankle: 2 x 15 reps with 3-second holds with 5lb AW over knee on RLE; 4# AW on LLE  ·   Knee - Terminal Knee Extension:  Large Bolster: 3 x15 reps 4# and 3 second holds  ·   Transfers - Sit to Stand: 15 times with staggered stance/foot placement from int for increased R LE use. (DNP)  ·   Knee " - prone knee hangs: 5 minutes /c 6# AW (DNP)  ·   Knee - Cybex Leg Press: 3 x 10 reps (5 plates) /c 5 sec hold at the bottom for knee flexion stretch  · Knee- Cybex HS curl 3 x 10 reps 4 plates Single Leg     Knee cybex extension stretch on HS curl. 4 plates 5 minute extended stretch.    Prone knee extension stretch x 5 minutes /c STM to Hamstrings (DNP)  ·  prone hamstring curl during prone knee extension 2 x 10 reps (each set performed after 1 minute stretch) (DNP)      Manual Therapy:   Patellar Glides 4 ways: x 4 minutes (patellar all directions)       Patient received the following supervised modalities after being cleared for contradictions: TENS:  Sabrina received TENS electrical stimulation for pain to the R knee x 0 min. DNP   Sabrina tolerated treatment well without any adverse effects.       Patient received Cold pack for 10 minutes to right knee post Rx.        Assessment     Patient participated with Rx this day and required VC/TC prn to improve exs quality and performance in order to improve ROM, activity andrei and functional mob.  She was able to tolerate progression with increased resistance with leg press She is expected to cont to progress and obtain her established goals in order to optimize her safe functional mob./[place].        Sabrina is progressing well towards her goals.   Pt prognosis is Good.       Pt's spiritual, cultural and educational needs considered and pt agreeable to plan of care and goals.     Anticipated barriers to physical therapy: N/A    Goals:     Short Term Goals  Status Goal   Met 7/8/19 1. Patient will report consistently elevating her R LE and performing ankle pumps to decrease swelling within 4 visits.   Met 7/8/19 2. Patient will demonstrate ability to perform proper setup for STS from chair to improve transfer ability at home within 6 visits.   Met 7/8/19 3. Patient will be independent with initial HEP for R LE knee strength and ROM to improve therapy outcomes within  8 visits.   Long Term Goals  Status Goal   In Progress 9/25/2019      4. Patient will report R LE pain at worst as </=4/10 to improve QoL within 24 visits.   Met 7/24/19 5. Patient will improve LEFS score from 12/80 to >/=40/80 to demonstrate an improvement in functional status within 24 visits.   In Progress 9/25/2019     6. Patient will improve R LE knee extension AROM to 0 degrees to improve ability to perform proper heel strike when ambulating within 24 visits.   In Progress 9/25/2019     7. Patient will demonstrate ability to ascend/descend 18 steps in PT gym with step through gait pattern and UE handrail assist to improve community ambulatory ability within 24 visits.   In Progress 9/25/2019     8. Patient will improve R LE knee strength to >/=4+/5 to improve ability to safely perform sit to stand transfers within 24 visits.        New LTG 9/25/2019   9. Patient will improve LEFS score from 72.5% to >/= 81% demonstrate an improvement in functional status by discharge.           Plan     Cont with current POC prog resistance as andrei.  with increased focus on eccentric right knee control.         Michael Raza PT

## 2019-09-30 ENCOUNTER — CLINICAL SUPPORT (OUTPATIENT)
Dept: REHABILITATION | Facility: HOSPITAL | Age: 49
End: 2019-09-30
Payer: MEDICAID

## 2019-09-30 DIAGNOSIS — R26.9 GAIT ABNORMALITY: ICD-10-CM

## 2019-09-30 DIAGNOSIS — M25.561 RIGHT KNEE PAIN, UNSPECIFIED CHRONICITY: Primary | ICD-10-CM

## 2019-09-30 PROCEDURE — 97110 THERAPEUTIC EXERCISES: CPT

## 2019-09-30 NOTE — PROGRESS NOTES
"  Physical Therapy Daily Treatment Note     Name: Sabrina Black  Clinic Number: [id number]    Therapy Diagnosis:   Encounter Diagnoses   Name Primary?    Right knee pain, unspecified chronicity     Gait abnormality      Physician: Alfredo Blanc MD    Visit Date: 9/30/2019    Physician Orders: PT EVAL AND RX  Medical Diagnosis: OA S/P R TKA  Evaluation Date: 6/10/19  Current Plan of Care: 8/26/19- 10/11/19  Authorization Period Expiration: 12 visits from 9/3/19-10/03/2019  Plan of Care Certification Period: 10/11/19   Visit #/Visits authorized: 7/12   PTA visit: 0/5    Monthly Assessment performed on 9/23/19, next follow up due on or before 10/23/19    Time In: 0758  Time Out: 0910  Total Billable Time: 56 minutes    Precautions: Standard    Subjective     Pt reports Patient amb into cliinc and relates 3/10 stiffness to R knee and states she has been able to perform her house work more easily.      Response to previous treatment: good   Functional change: Reports improvement in ADL's.     Pain:  3/10 stiffness  Location: right knee      Objective     Sabrina received 1:1 therapeutic exercises to develop ROM, strength, activity total for 56 minutes including:    ·  Cardio -  Recumbent Bike 10 minutes R:4  ·   Ankle - Gastroc stretch - slant board: 30"x3     Hip - Hamstring Stretch (Seated): 3 x 30second hold      Ankle - Heel Raises: 3 sets of 15    Step ups on step with 2 riser x 10 ea LE lead (for concentric/eccentric contraction)    Standing Hip Abduction 3 x 10 ea LE PTB    Mini squats 3x10 reps    LAQ 15 x 3 with 3 second hold at top 6# AW  ·   Knee - Flexion - heel slide (rope): 2 sets of 15 with 10 second holds (DNP)  ·   Knee - SLR bolster under ankle: 2 x 15 reps with 3-second holds with 5lb AW over knee on RLE; 4# AW on LLE   ·   Knee - Terminal Knee Extension:  Large Bolster: 3 x15 reps 4# and 3 second holds   ·   Transfers - Sit to Stand: 15 times with staggered stance/foot placement from [facility] for " increased R LE use. (DNP)  ·   Knee - prone knee hangs: 5 minutes /c 6# AW (DNP)  ·   Knee - Cybex Leg Press: 2 x 10 reps (4 plates) single leg Right   · Knee- Cybex HS curl 3 x 10 reps 4 plates Single Leg     Knee cybex extension stretch on HS curl. 4 plates 5 minute extended stretch with CP    Prone knee extension stretch x 5 minutes /c STM to Hamstrings (DNP)  ·  prone hamstring curl during prone knee extension 2 x 10 reps (each set performed after 1 minute stretch) (DNP)      Manual Therapy:   Patellar Glides 4 ways: x 0 minutes (patellar all directions)       Patient received the following supervised modalities after being cleared for contradictions: TENS:  Sabrina received TENS electrical stimulation for pain to the R knee x 0 min. DNP   Sabrina tolerated treatment well without any adverse effects.       Patient received Cold pack for 10 minutes to right knee post Rx.        Assessment       Patient participated with PT to address her R knee strength and function.  She required VC/TC for improved exs quality and performance in order to improve quad and hamstring activation as well as eccentric control for descending stairs.  She required several recovery periods as she fatigued but was able to recover.  She tolerated progressions with single leg TE and she is expected to cont to progress to her established goals with cont PT Rx in order to optimize her functional mob.      Sabrina is progressing well towards her goals.   Pt prognosis is Good.       Pt's spiritual, cultural and educational needs considered and pt agreeable to plan of care and goals.     Anticipated barriers to physical therapy: N/A    Goals:     Short Term Goals  Status Goal   Met 7/8/19 1. Patient will report consistently elevating her R LE and performing ankle pumps to decrease swelling within 4 visits.   Met 7/8/19 2. Patient will demonstrate ability to perform proper setup for STS from chair to improve transfer ability at home within 6 visits.    Met 7/8/19 3. Patient will be independent with initial HEP for R LE knee strength and ROM to improve therapy outcomes within 8 visits.   Long Term Goals  Status Goal   Met 9/30/2019    4. Patient will report R LE pain at worst as </=4/10 to improve QoL within 24 visits.   Met 7/24/19 5. Patient will improve LEFS score from 12/80 to >/=40/80 to demonstrate an improvement in functional status within 24 visits.   In Progress 9/30/2019     6. Patient will improve R LE knee extension AROM to 0 degrees to improve ability to perform proper heel strike when ambulating within 24 visits.   In Progress 9/30/2019     7. Patient will demonstrate ability to ascend/descend 18 steps in PT gym with step through gait pattern and UE handrail assist to improve community ambulatory ability within 24 visits.   In Progress 9/30/2019     8. Patient will improve R LE knee strength to >/=4+/5 to improve ability to safely perform sit to stand transfers within 24 visits.        New LTG 9/30/2019   9. Patient will improve LEFS score from 72.5% to >/= 81% demonstrate an improvement in functional status by discharge.           Plan     Cont with current POC prog resistance as andrei.  with increased focus on eccentric right knee control.         Michael Raza PT

## 2019-10-04 ENCOUNTER — CLINICAL SUPPORT (OUTPATIENT)
Dept: REHABILITATION | Facility: HOSPITAL | Age: 49
End: 2019-10-04
Payer: MEDICAID

## 2019-10-04 DIAGNOSIS — R26.9 GAIT ABNORMALITY: ICD-10-CM

## 2019-10-04 DIAGNOSIS — M25.561 RIGHT KNEE PAIN, UNSPECIFIED CHRONICITY: Primary | ICD-10-CM

## 2019-10-04 PROCEDURE — 97110 THERAPEUTIC EXERCISES: CPT

## 2019-10-04 NOTE — PROGRESS NOTES
"  Physical Therapy Daily Treatment Note     Name: Sabrina Black  Clinic Number: [id number]    Therapy Diagnosis:   Encounter Diagnoses   Name Primary?    Right knee pain, unspecified chronicity     Gait abnormality      Physician: Alfredo Blanc MD    Visit Date: 10/4/2019    Physician Orders: PT EVAL AND RX  Medical Diagnosis: OA S/P R TKA  Evaluation Date: 6/10/19  Current Plan of Care: 8/26/19- 10/11/19  Authorization Period Expiration: 12 visits from 9/3/19-10/03/2019  Plan of Care Certification Period: 10/11/19   Visit #/Visits authorized: 8/12   PTA visit: 0/5    Monthly Assessment performed on 9/23/19, next follow up due on or before 10/23/19    Time In: 0905  Time Out: 1015      Precautions: Standard    Subjective     Pt reports Patient amb into cliinc and relates 4/10 pain/stiffness to R knee and states she has been able to perform her house work more easily.      Response to previous treatment: good   Functional change: Reports improvement in ADL's, and was able to perform painting her home over the last few days.      Pain:  4/10 stiffness  Location: right knee      Objective     Sabrina received 1:1 therapeutic exercises to develop ROM, strength, activity total for 55 minutes including:    ·  Cardio -  Recumbent Bike 10 minutes R:4  ·   Ankle - Gastroc stretch - slant board: 30"x3     Hip - Hamstring Stretch (Seated): 3 x 30second hold (DNP)     Ankle - Heel Raises: 3 sets of 15    Step ups on step with 2 riser x 10 ea LE lead (for concentric/eccentric contraction) (DNP)    Standing Hip Abduction 3 x 10 ea LE PTB    Mini squats 10 x single leg    LAQ 15 x 3 with 3 second hold at top 6# AW (DNP)  ·   Knee - SLR bolster under ankle: 2 x 15 reps with 3-second holds with 5lb AW over knee on RLE; 4# AW on LLE    +TKE Standing Blue TB 15 x 2  ·   Knee - Terminal Knee Extension:  Large Bolster: 3 x15 reps 4# and 3 second holds (DNP)  ·   Transfers - Sit to Stand: 15 times with staggered stance/foot placement " from [facility] for increased R LE use.  ·   Knee - prone knee hangs: 5 minutes /c 6# AW (DNP)  ·   Knee - Cybex Leg Press: 2 x 15 reps (4 plates) single leg Right   · Knee- Cybex HS curl 2 x 15 reps 4 plates Single Leg     Knee cybex extension stretch on HS curl. 4 plates 5 minute extended stretch with CP (DNP)    Prone knee extension stretch x 5 minutes /c STM to Hamstrings (DNP)  ·  prone hamstring curl during prone knee extension 2 x 10 reps (each set performed after 1 minute stretch) (DNP)   +stairs- Ascended/descended 18 steps with HR use prn and with a step to strategy with concentric/ecentric control.     +step ups 10 x 3 for     Knee - Flexion - heel slide (rope): 2 sets of 15 with 10 second holds (ROSEMARIE)    Manual Therapy:   Patellar Glides 4 ways: x 0 minutes (patellar all directions)           Patient received the following supervised modalities after being cleared for contradictions: TENS:  Sabrina received TENS electrical stimulation for pain to the R knee x 0 min. DNP   Sabrina tolerated treatment well without any adverse effects.       Patient received Cold pack for 10 minutes to right knee post Rx.        Assessment     Patient participated with Rx to address her R knee/LE deficits.  She required education/VC/and TC for improved exs quality and performance and for improved concentric/eccentric control to R knee.   She was able to andrei progressed exs with single leg and functional stairs with no c/o but did fatigue requiring recovery periods.  She is expected to progress and obtain her goals with Rx in order to optimize her functional mob and [place].        Sabrina is progressing well towards her goals.   Pt prognosis is Good.       Pt's spiritual, cultural and educational needs considered and pt agreeable to plan of care and goals.     Anticipated barriers to physical therapy: N/A    Goals:     Short Term Goals  Status Goal   Met 7/8/19 1. Patient will report consistently elevating her R LE and  performing ankle pumps to decrease swelling within 4 visits.   Met 7/8/19 2. Patient will demonstrate ability to perform proper setup for STS from chair to improve transfer ability at home within 6 visits.   Met 7/8/19 3. Patient will be independent with initial HEP for R LE knee strength and ROM to improve therapy outcomes within 8 visits.   Long Term Goals  Status Goal   Met 10/4/2019    4. Patient will report R LE pain at worst as </=4/10 to improve QoL within 24 visits.   Met 7/24/19 5. Patient will improve LEFS score from 12/80 to >/=40/80 to demonstrate an improvement in functional status within 24 visits.   In Progress 10/4/2019     6. Patient will improve R LE knee extension AROM to 0 degrees to improve ability to perform proper heel strike when ambulating within 24 visits.   In Progress 10/4/2019     7. Patient will demonstrate ability to ascend/descend 18 steps in PT gym with step through gait pattern and UE handrail assist to improve community ambulatory ability within 24 visits.   In Progress 10/4/2019     8. Patient will improve R LE knee strength to >/=4+/5 to improve ability to safely perform sit to stand transfers within 24 visits.        New LTG 10/4/2019   9. Patient will improve LEFS score from 72.5% to >/= 81% demonstrate an improvement in functional status by discharge.           Plan     Cont with current POC prog resistance as andrei.  with increased focus on eccentric right knee control, address final HEP and educate.        Michael Raza PT

## 2019-10-07 ENCOUNTER — CLINICAL SUPPORT (OUTPATIENT)
Dept: REHABILITATION | Facility: HOSPITAL | Age: 49
End: 2019-10-07
Payer: MEDICAID

## 2019-10-07 DIAGNOSIS — R26.9 GAIT ABNORMALITY: ICD-10-CM

## 2019-10-07 DIAGNOSIS — M25.561 RIGHT KNEE PAIN, UNSPECIFIED CHRONICITY: Primary | ICD-10-CM

## 2019-10-07 PROCEDURE — 97110 THERAPEUTIC EXERCISES: CPT

## 2019-10-07 NOTE — PROGRESS NOTES
"  Physical Therapy Daily Treatment Note     Name: Sabrina Black  Clinic Number: [id number]    Therapy Diagnosis:   Encounter Diagnoses   Name Primary?    Right knee pain, unspecified chronicity     Gait abnormality      Physician: Alfredo Blanc MD    Visit Date: 10/7/2019    Physician Orders: PT EVAL AND RX  Medical Diagnosis: OA S/P R TKA  Evaluation Date: 6/10/19  Current Plan of Care: 8/26/19- 10/11/19  Authorization Period Expiration: 12 visits from 9/3/19-10/03/2019  Plan of Care Certification Period: 10/11/19   Visit #/Visits authorized: 9/12   PTA visit: 0/5    Monthly Assessment performed on 9/23/19, next follow up due on or before 10/23/19    Time In: 1000  Time Out: 1105      Precautions: Standard    Subjective     Pt reports Patient amb into cliinc and relates 4/10 pain/stiffness to R knee and states she has been able to perform her house work more easily.      Response to previous treatment: good   Functional change: Reports improvement in ADL's, and was able to perform painting her home over the last few days.      Pain:  4/10 pain/stiffness  Location: right knee      Objective     Sabrina received 1:1 therapeutic exercises to develop ROM, strength, activity total for 55 minutes including:    · Cardio -  Recumbent Bike 10 minutes R:4  · Ankle - Gastroc stretch - slant board: 30"x3  · Ankle - Heel Raises: 3 sets of 15  · Step ups on step with 2 riser x 10 ea LE lead (for concentric/eccentric contraction) (DNP)  · Standing Hip Abduction 3 x 10 ea LE BTB  · Mini squats 10 x single leg  · Mini squats 20 x   · Knee - SLR bolster under ankle: 2 x 15 reps with 3-second holds with 5lb AW over knee on RLE; 4# AW on LLE   · TKE Standing Blue TB 15 x 2  · Transfers - Sit to Stand: 15 times with staggered stance/foot placement from [facility] for increased R LE use.  · Knee - Cybex Leg Press: 2 x 10 reps (4 plates) single leg Right   · Knee- Cybex HS curl 2 x 15 reps 4 plates Single Leg  · Knee cybex extension " stretch on HS curl. 4 plates 10 minute extended stretch with CP  · Stairs- Ascended/descended 18 steps with HR use prn and with a step to strategy with concentric/ecentric control (with tactile stim for HS activation with ascending).        Hip - Hamstring Stretch (Seated): 3 x 30second hold (DNP)  Step ups 10 x 3 for  Concentric and eccentric control.  (DNP)  Knee - Flexion - heel slide (rope): 2 sets of 15 with 10 second holds (DNP)  LAQ 15 x 3 with 3 second hold at top 6# AW ([facility])  Knee - Terminal Knee Extension:  Large Bolster: 3 x15 reps 4# and 3 second holds ([facility])  Knee - prone knee hangs: 5 minutes /c 6# AW ([facility])  Prone knee extension stretch x 5 minutes /c STM to Hamstrings ([facility])  prone hamstring curl during prone knee extension 2 x 10 reps (each set performed after 1 minute stretch) ([facility])          Manual Therapy:   Patellar Glides 4 ways: x 0 minutes (patellar all directions)           Patient received the following supervised modalities after being cleared for contradictions: TENS:  Sabrina received TENS electrical stimulation for pain to the R knee x 0 min. DNP   Sabrina tolerated treatment well without any adverse effects.         Assessment     Patient cont to participate with Rx to address her strength, ROM, activity andrei and functional mob. Moreover, to facilitate increased Right knee concentric/eccentric control for safe stair negotiation.  She still fatigues with exertion but is able to recover with rest.  She is expected to progress to established goals with Rx in order to optimize her Independent functional mobility.        Sabrina is progressing well towards her goals.   Pt prognosis is Good.       Pt's spiritual, cultural and educational needs considered and pt agreeable to plan of care and goals.     Anticipated barriers to physical therapy: N/A    Goals:     Short Term Goals  Status Goal   Met 7/8/19 1. Patient will report consistently elevating her R LE and performing ankle pumps to decrease  swelling within 4 visits.   Met 7/8/19 2. Patient will demonstrate ability to perform proper setup for STS from chair to improve transfer ability at home within 6 visits.   Met 7/8/19 3. Patient will be independent with initial HEP for R LE knee strength and ROM to improve therapy outcomes within 8 visits.   Long Term Goals  Status Goal   Met 10/7/2019    4. Patient will report R LE pain at worst as </=4/10 to improve QoL within 24 visits.   Met 7/24/19 5. Patient will improve LEFS score from 12/80 to >/=40/80 to demonstrate an improvement in functional status within 24 visits.   In Progress 10/7/2019     6. Patient will improve R LE knee extension AROM to 0 degrees to improve ability to perform proper heel strike when ambulating within 24 visits.   In Progress 10/7/2019     7. Patient will demonstrate ability to ascend/descend 18 steps in PT gym with step through gait pattern and UE handrail assist to improve community ambulatory ability within 24 visits.   In Progress 10/7/2019     8. Patient will improve R LE knee strength to >/=4+/5 to improve ability to safely perform sit to stand transfers within 24 visits.        New LTG 10/7/2019   9. Patient will improve LEFS score from 72.5% to >/= 81% demonstrate an improvement in functional status by discharge.           Plan     Cont with current POC prog resistance as andrei. with increased focus on eccentric right knee control, address final HEP and educate.        Michael Raza PT

## 2019-10-08 ENCOUNTER — TELEPHONE (OUTPATIENT)
Dept: ORTHOPEDICS | Facility: CLINIC | Age: 49
End: 2019-10-08

## 2019-10-08 RX ORDER — TRAMADOL HYDROCHLORIDE 50 MG/1
50 TABLET ORAL EVERY 6 HOURS
COMMUNITY
Start: 2019-10-08 | End: 2019-10-22

## 2019-10-08 NOTE — TELEPHONE ENCOUNTER
----- Message from Raeann Del Real sent at 10/8/2019  1:28 PM CDT -----  Contact: patient   Pt called ans is asking for a refill on her pain medication- Hydrocodone 5.325 mg, last refill was about 2 weeks ago.    PTS # [phone]

## 2019-10-08 NOTE — TELEPHONE ENCOUNTER
Spoke with pt, wants a refill on pain meds. Will speak with Dr. Blanc as she is out of her post op period

## 2019-10-31 ENCOUNTER — OFFICE VISIT (OUTPATIENT)
Dept: ORTHOPEDICS | Facility: CLINIC | Age: 49
End: 2019-10-31
Payer: MEDICAID

## 2019-10-31 VITALS
DIASTOLIC BLOOD PRESSURE: 77 MMHG | WEIGHT: 230 LBS | SYSTOLIC BLOOD PRESSURE: 125 MMHG | HEIGHT: 68 IN | BODY MASS INDEX: 34.86 KG/M2 | HEART RATE: 86 BPM

## 2019-10-31 DIAGNOSIS — Z96.651 HISTORY OF TOTAL KNEE ARTHROPLASTY, RIGHT: Primary | ICD-10-CM

## 2019-10-31 DIAGNOSIS — M17.12 PRIMARY OSTEOARTHRITIS OF LEFT KNEE: ICD-10-CM

## 2019-10-31 PROCEDURE — 20610 DRAIN/INJ JOINT/BURSA W/O US: CPT | Mod: LT,S$GLB,, | Performed by: ORTHOPAEDIC SURGERY

## 2019-10-31 PROCEDURE — 99213 PR OFFICE/OUTPT VISIT, EST, LEVL III, 20-29 MIN: ICD-10-PCS | Mod: 25,S$GLB,, | Performed by: ORTHOPAEDIC SURGERY

## 2019-10-31 PROCEDURE — 20610 LARGE JOINT ASPIRATION/INJECTION: L KNEE: ICD-10-PCS | Mod: LT,S$GLB,, | Performed by: ORTHOPAEDIC SURGERY

## 2019-10-31 PROCEDURE — 99213 OFFICE O/P EST LOW 20 MIN: CPT | Mod: 25,S$GLB,, | Performed by: ORTHOPAEDIC SURGERY

## 2019-10-31 RX ORDER — TRAMADOL HYDROCHLORIDE 50 MG/1
TABLET ORAL
COMMUNITY
Start: 2019-10-20 | End: 2019-12-03 | Stop reason: ALTCHOICE

## 2019-10-31 RX ORDER — AMOXICILLIN 500 MG
1 CAPSULE ORAL DAILY
COMMUNITY

## 2019-10-31 RX ORDER — ESCITALOPRAM OXALATE 10 MG/1
20 TABLET ORAL DAILY
COMMUNITY
Start: 2019-10-22 | End: 2020-01-28

## 2019-10-31 RX ORDER — ACETAMINOPHEN AND PHENYLEPHRINE HCL 325; 5 MG/1; MG/1
10000 TABLET ORAL DAILY
COMMUNITY

## 2019-10-31 RX ORDER — MULTIVITAMIN
1 TABLET ORAL DAILY
COMMUNITY

## 2019-10-31 RX ORDER — HYDROXYZINE PAMOATE 25 MG/1
CAPSULE ORAL
COMMUNITY
Start: 2019-10-22 | End: 2019-12-03 | Stop reason: ALTCHOICE

## 2019-10-31 RX ORDER — METHYLPREDNISOLONE ACETATE 40 MG/ML
40 INJECTION, SUSPENSION INTRA-ARTICULAR; INTRALESIONAL; INTRAMUSCULAR; SOFT TISSUE
Status: DISCONTINUED | OUTPATIENT
Start: 2019-10-31 | End: 2019-10-31 | Stop reason: HOSPADM

## 2019-10-31 RX ADMIN — METHYLPREDNISOLONE ACETATE 40 MG: 40 INJECTION, SUSPENSION INTRA-ARTICULAR; INTRALESIONAL; INTRAMUSCULAR; SOFT TISSUE at 08:10

## 2019-10-31 NOTE — PROGRESS NOTES
[facility]    Subjective:     Chief Complaint:   Chief Complaint   Patient presents with    Right Knee - Pain     Right TKA 6.5.19. States that her knee is doing ok, still has pain that is worse when she is standing or walking on it.        Past Medical History:   Diagnosis Date    Bipolar 1 disorder     Diabetes mellitus, type 2     Knee injury     Lumbar pain     Parkinson's disease        Past Surgical History:   Procedure Laterality Date    ABLATION      CHOLECYSTECTOMY  08/27/2018    Dr. Petersen     knee and back surgery      KNEE SURGERY      TOTAL KNEE ARTHROPLASTY Right 06/05/2019       Current Outpatient Medications   Medication Sig    atorvastatin (LIPITOR) 20 MG tablet Take 20 mg by mouth.    biotin 10,000 mcg Cap Take 10,000 mEq by mouth.    carbidopa-levodopa  mg (SINEMET)  mg per tablet Take 1 tablet by mouth.    escitalopram oxalate (LEXAPRO) 10 MG tablet TK 1 T PO QD    fish oil-omega-3 fatty acids 300-1,000 mg capsule Take 1 capsule by mouth.    hydrOXYzine pamoate (VISTARIL) 25 MG Cap 1 capsule as needed    levothyroxine (SYNTHROID) 88 MCG tablet Take 88 mcg by mouth once daily.    multivitamin (THERAGRAN) per tablet Take 1 tablet by mouth.    pramipexole (MIRAPEX) 0.125 MG tablet Take 0.125 mg by mouth.    solifenacin (VESICARE) 5 MG tablet TAKE 1 TABLET BY MOUTH EVERY DAY    traMADol (ULTRAM) 50 mg tablet TK 1 T PO Q 6 H PRN P    traZODone (DESYREL) 150 MG tablet TK 1 T PO HS    hydroCHLOROthiazide (HYDRODIURIL) 25 MG tablet Take 25 mg by mouth daily as needed.    mirabegron (MYRBETRIQ) 50 mg Tb24 Take 1 tablet (50 mg total) by mouth once daily.    oxybutynin (DITROPAN-XL) 5 MG TR24 Take 1 tablet (5 mg total) by mouth once daily.     No current facility-administered medications for this visit.        Review of patient's allergies indicates:   Allergen Reactions    Morphine     Pcn [penicillins]        Family History   Problem Relation Age of  Onset    Cancer Father     Diabetes Father     Hyperlipidemia Father     Cancer Paternal Aunt     Cancer Maternal Grandfather     Hyperlipidemia Maternal Grandfather     Hyperlipidemia Paternal Grandmother     Hyperlipidemia Paternal Grandfather        Social History     Socioeconomic History    Marital status:      Spouse name: Not on file    Number of children: Not on file    Years of education: Not on file    Highest education level: Not on file   Occupational History    Not on file   Social Needs    Financial resource strain: Not on file    Food insecurity:     Worry: Not on file     Inability: Not on file    Transportation needs:     Medical: Not on file     Non-medical: Not on file   Tobacco Use    Smoking status: Former Smoker     Packs/day: 1.00     Types: Cigarettes    Smokeless tobacco: Never Used   Substance and Sexual Activity    Alcohol use: No     Frequency: Never    Drug use: No    Sexual activity: Not Currently   Lifestyle    Physical activity:     Days per week: Not on file     Minutes per session: Not on file    Stress: Not on file   Relationships    Social connections:     Talks on phone: Not on file     Gets together: Not on file     Attends Hinduism service: Not on file     Active member of club or organization: Not on file     Attends meetings of clubs or organizations: Not on file     Relationship status: Not on file   Other Topics Concern    Not on file   Social History Narrative    Not on file       History of present illness:  Patient comes in today for the right knee.  She is also here for her left knee.  Her right knee is improving.  It still aches and bothers her.  Her left knee is giving her lot of pain primarily on the medial aspect.      Review of Systems:    Constitution: Negative for chills, fever, and sweats.  Negative for unexplained weight loss.    HENT:  Negative for headaches and blurry vision.    Cardiovascular:Negative for chest pain or  irregular heart beat. Negative for hypertension.    Respiratory:  Negative for cough and shortness of breath.    Gastrointestinal: Negative for abdominal pain, heartburn, melena, nausea, and vomitting.    Genitourinary:  Negative bladder incontinence and dysuria.    Musculoskeletal:  See HPI for details.     Neurological: Negative for numbness.    Psychiatric/Behavioral: Negative for depression.  The patient is not nervous/anxious.      Endocrine: Negative for polyuria    Hematologic/Lymphatic: Negative for bleeding problem.  Does not bruise/bleed easily.    Skin: Negative for poor would healing and rash    Objective:      Physical Examination:    Vital Signs:    Vitals:    10/31/19 0818   BP: 125/77   Pulse: 86       Body mass index is 34.97 kg/m².    This a well-developed, well nourished patient in no acute distress.  They are alert and oriented and cooperative to examination.        Patient is a lot of left medial joint line tenderness.  She has varus deformity of the left.  Her right knee demonstrates a well-healed incision.  Her range of motion of the right knee is 0-120 degrees.  This is symmetric to the left knee.  No effusion on the right.  1+ effusion on the left.  Negative straight leg raises.  EHL is were intact. Deep tendon reflexes are intact.  She can heel walk toe walk and squat to neutral   Pertinent New Results:    XRAY Report / Interpretation:   AP lateral and sunrise views of the right knee demonstrate right total knee to be in ideal position.  No evidence of loosening subluxation or periprosthetic lines.    Assessment/Plan:      Right total knee.  Continue strengthening.  Left knee advanced arthritis.  I injected the left knee with Depo-Medrol and lidocaine.  Follow-up in 3 months      This note was created using Dragon voice recognition software that occasionally misinterpreted phrases or words.

## 2019-10-31 NOTE — PROCEDURES
Large Joint Aspiration/Injection: L knee  Date/Time: 10/31/2019 8:15 AM  Performed by: Alfredo Blanc MD  Authorized by: Alfredo Blanc MD     Consent Done?:  Yes (Verbal)  Indications:  Pain  Procedure site marked: Yes    Timeout: Prior to procedure the correct patient, procedure, and site was verified    Anesthesia  Local anesthesia used  Anesthesia: local infiltration  Anesthetic: lidocaine 1% without epinephrine    Location:  Knee  Site:  L knee  Prep: Patient was prepped and draped in usual sterile fashion    Needle size:  25 G  Medications:  40 mg methylPREDNISolone acetate 40 mg/mL  Patient tolerance:  Patient tolerated the procedure well with no immediate complications

## 2019-11-19 ENCOUNTER — OFFICE VISIT (OUTPATIENT)
Dept: ORTHOPEDICS | Facility: CLINIC | Age: 49
End: 2019-11-19
Payer: MEDICAID

## 2019-11-19 ENCOUNTER — TELEPHONE (OUTPATIENT)
Dept: ORTHOPEDICS | Facility: CLINIC | Age: 49
End: 2019-11-19

## 2019-11-19 VITALS — BODY MASS INDEX: 34.97 KG/M2 | WEIGHT: 230 LBS | SYSTOLIC BLOOD PRESSURE: 124 MMHG | DIASTOLIC BLOOD PRESSURE: 80 MMHG

## 2019-11-19 DIAGNOSIS — G56.01 RIGHT CARPAL TUNNEL SYNDROME: ICD-10-CM

## 2019-11-19 DIAGNOSIS — G56.02 LEFT CARPAL TUNNEL SYNDROME: Primary | ICD-10-CM

## 2019-11-19 PROCEDURE — 99214 OFFICE O/P EST MOD 30 MIN: CPT | Mod: 25,57,S$GLB, | Performed by: ORTHOPAEDIC SURGERY

## 2019-11-19 PROCEDURE — 99214 PR OFFICE/OUTPT VISIT, EST, LEVL IV, 30-39 MIN: ICD-10-PCS | Mod: 25,57,S$GLB, | Performed by: ORTHOPAEDIC SURGERY

## 2019-11-19 RX ORDER — TRAMADOL HYDROCHLORIDE 50 MG/1
50 TABLET ORAL EVERY 12 HOURS PRN
COMMUNITY
Start: 2019-11-12 | End: 2019-12-10

## 2019-11-19 RX ORDER — ALBUTEROL SULFATE 90 UG/1
AEROSOL, METERED RESPIRATORY (INHALATION)
Refills: 2 | COMMUNITY
Start: 2019-11-15 | End: 2019-12-03 | Stop reason: ALTCHOICE

## 2019-11-19 RX ORDER — OMEPRAZOLE 20 MG/1
CAPSULE, DELAYED RELEASE ORAL
Refills: 2 | COMMUNITY
Start: 2019-10-28 | End: 2019-12-03 | Stop reason: ALTCHOICE

## 2019-11-19 NOTE — H&P (VIEW-ONLY)
[facility]    Subjective:     Chief Complaint:   Chief Complaint   Patient presents with    Left Hand - Pain     Bilateral hand pain x years. She has numbness in hands    Right Hand - Pain       Past Medical History:   Diagnosis Date    Bipolar 1 disorder     Diabetes mellitus, type 2     Knee injury     Lumbar pain     Parkinson's disease        Past Surgical History:   Procedure Laterality Date    ABLATION      CHOLECYSTECTOMY  08/27/2018    Dr. Petersen     knee and back surgery      KNEE SURGERY      TOTAL KNEE ARTHROPLASTY Right 06/05/2019       Current Outpatient Medications   Medication Sig    albuterol (PROVENTIL/VENTOLIN HFA) 90 mcg/actuation inhaler INL 2 PFS PO Q 6 H    atorvastatin (LIPITOR) 20 MG tablet Take 20 mg by mouth.    biotin 10,000 mcg Cap Take 10,000 mEq by mouth.    carbidopa-levodopa  mg (SINEMET)  mg per tablet Take 1 tablet by mouth.    escitalopram oxalate (LEXAPRO) 10 MG tablet TK 1 T PO QD    fish oil-omega-3 fatty acids 300-1,000 mg capsule Take 1 capsule by mouth.    hydroCHLOROthiazide (HYDRODIURIL) 25 MG tablet Take 25 mg by mouth daily as needed.    hydrOXYzine pamoate (VISTARIL) 25 MG Cap 1 capsule as needed    levothyroxine (SYNTHROID) 88 MCG tablet Take 88 mcg by mouth once daily.    multivitamin (THERAGRAN) per tablet Take 1 tablet by mouth.    omeprazole (PRILOSEC) 20 MG capsule TK 1 T PO QD    pramipexole (MIRAPEX) 0.125 MG tablet Take 0.125 mg by mouth.    solifenacin (VESICARE) 5 MG tablet TAKE 1 TABLET BY MOUTH EVERY DAY    traMADol (ULTRAM) 50 mg tablet TK 1 T PO Q 6 H PRN P    traZODone (DESYREL) 150 MG tablet TK 1 T PO HS    mirabegron (MYRBETRIQ) 50 mg Tb24 Take 1 tablet (50 mg total) by mouth once daily.    oxybutynin (DITROPAN-XL) 5 MG TR24 Take 1 tablet (5 mg total) by mouth once daily.     No current facility-administered medications for this visit.        Review of patient's allergies indicates:   Allergen  Reactions    Morphine     Pcn [penicillins]        Family History   Problem Relation Age of Onset    Cancer Father     Diabetes Father     Hyperlipidemia Father     Cancer Paternal Aunt     Cancer Maternal Grandfather     Hyperlipidemia Maternal Grandfather     Hyperlipidemia Paternal Grandmother     Hyperlipidemia Paternal Grandfather        Social History     Socioeconomic History    Marital status:      Spouse name: Not on file    Number of children: Not on file    Years of education: Not on file    Highest education level: Not on file   Occupational History    Not on file   Social Needs    Financial resource strain: Not on file    Food insecurity:     Worry: Not on file     Inability: Not on file    Transportation needs:     Medical: Not on file     Non-medical: Not on file   Tobacco Use    Smoking status: Former Smoker     Packs/day: 1.00     Types: Cigarettes    Smokeless tobacco: Never Used   Substance and Sexual Activity    Alcohol use: No     Frequency: Never    Drug use: No    Sexual activity: Not Currently   Lifestyle    Physical activity:     Days per week: Not on file     Minutes per session: Not on file    Stress: Not on file   Relationships    Social connections:     Talks on phone: Not on file     Gets together: Not on file     Attends Church service: Not on file     Active member of club or organization: Not on file     Attends meetings of clubs or organizations: Not on file     Relationship status: Not on file   Other Topics Concern    Not on file   Social History Narrative    Not on file       History of present illness:  Patient comes in today for the bilateral hands.  She complains of numbness and tingling in both her hands much worse on the right.  She saw her neurologist order nerve conduction studies.  They demonstrated bilateral carpal tunnel syndrome.      Review of Systems:    Constitution: Negative for chills, fever, and sweats.  Negative for  unexplained weight loss.    HENT:  Negative for headaches and blurry vision.    Cardiovascular:Negative for chest pain or irregular heart beat. Negative for hypertension.    Respiratory:  Negative for cough and shortness of breath.    Gastrointestinal: Negative for abdominal pain, heartburn, melena, nausea, and vomitting.    Genitourinary:  Negative bladder incontinence and dysuria.    Musculoskeletal:  See HPI for details.     Neurological: Negative for numbness.    Psychiatric/Behavioral: Negative for depression.  The patient is not nervous/anxious.      Endocrine: Negative for polyuria    Hematologic/Lymphatic: Negative for bleeding problem.  Does not bruise/bleed easily.    Skin: Negative for poor would healing and rash    Objective:      Physical Examination:    Vital Signs:    Vitals:    11/19/19 1521   BP: 124/80       Body mass index is 34.97 kg/m².    This a well-developed, well nourished patient in no acute distress.  They are alert and oriented and cooperative to examination.        Patient has range of motion of the hand on the right full.  Range of motion of the hand on the left full.  Positive Tinel's bilaterally. Decreased sensation in the bilateral median distributions.  Negative Tinel's at the elbows.  Spurling sign is negative.  Pertinent New Results:  Nerve conduction studies demonstrate bilateral carpal tunnel syndrome.  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  Three views of the left hand demonstrate mild basilar arthritis.    Assessment/Plan:      Bilateral carpal tunnel syndrome.  I have offered her a right carpal tunnel release.  Risks and benefits are discussed with  her in great detail.  She understood and wished to proceed.  We spoke specifically about injury to the recurrent branch of the median nerve pillar pain infection other complications      This note was created using Dragon voice recognition software that occasionally  misinterpreted phrases or words.

## 2019-11-19 NOTE — TELEPHONE ENCOUNTER
Pt informed of preop date & time. Also requesting a refill on tramadol. As per Dr. Blanc ok to refill

## 2019-11-19 NOTE — PROGRESS NOTES
[facility]    Subjective:     Chief Complaint:   Chief Complaint   Patient presents with    Left Hand - Pain     Bilateral hand pain x years. She has numbness in hands    Right Hand - Pain       Past Medical History:   Diagnosis Date    Bipolar 1 disorder     Diabetes mellitus, type 2     Knee injury     Lumbar pain     Parkinson's disease        Past Surgical History:   Procedure Laterality Date    ABLATION      CHOLECYSTECTOMY  08/27/2018    Dr. Petersen     knee and back surgery      KNEE SURGERY      TOTAL KNEE ARTHROPLASTY Right 06/05/2019       Current Outpatient Medications   Medication Sig    albuterol (PROVENTIL/VENTOLIN HFA) 90 mcg/actuation inhaler INL 2 PFS PO Q 6 H    atorvastatin (LIPITOR) 20 MG tablet Take 20 mg by mouth.    biotin 10,000 mcg Cap Take 10,000 mEq by mouth.    carbidopa-levodopa  mg (SINEMET)  mg per tablet Take 1 tablet by mouth.    escitalopram oxalate (LEXAPRO) 10 MG tablet TK 1 T PO QD    fish oil-omega-3 fatty acids 300-1,000 mg capsule Take 1 capsule by mouth.    hydroCHLOROthiazide (HYDRODIURIL) 25 MG tablet Take 25 mg by mouth daily as needed.    hydrOXYzine pamoate (VISTARIL) 25 MG Cap 1 capsule as needed    levothyroxine (SYNTHROID) 88 MCG tablet Take 88 mcg by mouth once daily.    multivitamin (THERAGRAN) per tablet Take 1 tablet by mouth.    omeprazole (PRILOSEC) 20 MG capsule TK 1 T PO QD    pramipexole (MIRAPEX) 0.125 MG tablet Take 0.125 mg by mouth.    solifenacin (VESICARE) 5 MG tablet TAKE 1 TABLET BY MOUTH EVERY DAY    traMADol (ULTRAM) 50 mg tablet TK 1 T PO Q 6 H PRN P    traZODone (DESYREL) 150 MG tablet TK 1 T PO HS    mirabegron (MYRBETRIQ) 50 mg Tb24 Take 1 tablet (50 mg total) by mouth once daily.    oxybutynin (DITROPAN-XL) 5 MG TR24 Take 1 tablet (5 mg total) by mouth once daily.     No current facility-administered medications for this visit.        Review of patient's allergies indicates:   Allergen  Reactions    Morphine     Pcn [penicillins]        Family History   Problem Relation Age of Onset    Cancer Father     Diabetes Father     Hyperlipidemia Father     Cancer Paternal Aunt     Cancer Maternal Grandfather     Hyperlipidemia Maternal Grandfather     Hyperlipidemia Paternal Grandmother     Hyperlipidemia Paternal Grandfather        Social History     Socioeconomic History    Marital status:      Spouse name: Not on file    Number of children: Not on file    Years of education: Not on file    Highest education level: Not on file   Occupational History    Not on file   Social Needs    Financial resource strain: Not on file    Food insecurity:     Worry: Not on file     Inability: Not on file    Transportation needs:     Medical: Not on file     Non-medical: Not on file   Tobacco Use    Smoking status: Former Smoker     Packs/day: 1.00     Types: Cigarettes    Smokeless tobacco: Never Used   Substance and Sexual Activity    Alcohol use: No     Frequency: Never    Drug use: No    Sexual activity: Not Currently   Lifestyle    Physical activity:     Days per week: Not on file     Minutes per session: Not on file    Stress: Not on file   Relationships    Social connections:     Talks on phone: Not on file     Gets together: Not on file     Attends [employer] service: Not on file     Active member of club or organization: Not on file     Attends meetings of clubs or organizations: Not on file     Relationship status: Not on file   Other Topics Concern    Not on file   Social History Narrative    Not on file       History of present illness:  Patient comes in today for the bilateral hands.  She complains of numbness and tingling in both her hands much worse on the right.  She saw her neurologist order nerve conduction studies.  They demonstrated bilateral carpal tunnel syndrome.      Review of Systems:    Constitution: Negative for chills, fever, and sweats.  Negative for  unexplained weight loss.    HENT:  Negative for headaches and blurry vision.    Cardiovascular:Negative for chest pain or irregular heart beat. Negative for hypertension.    Respiratory:  Negative for cough and shortness of breath.    Gastrointestinal: Negative for abdominal pain, heartburn, melena, nausea, and vomitting.    Genitourinary:  Negative bladder incontinence and dysuria.    Musculoskeletal:  See HPI for details.     Neurological: Negative for numbness.    Psychiatric/Behavioral: Negative for depression.  The patient is not nervous/anxious.      Endocrine: Negative for polyuria    Hematologic/Lymphatic: Negative for bleeding problem.  Does not bruise/bleed easily.    Skin: Negative for poor would healing and rash    Objective:      Physical Examination:    Vital Signs:    Vitals:    11/19/19 1521   BP: 124/80       Body mass index is 34.97 kg/m².    This a well-developed, well nourished patient in no acute distress.  They are alert and oriented and cooperative to examination.        Patient has range of motion of the hand on the right full.  Range of motion of the hand on the left full.  Positive Tinel's bilaterally. Decreased sensation in the bilateral median distributions.  Negative Tinel's at the elbows.  Spurling sign is negative.  Pertinent New Results:  Nerve conduction studies demonstrate bilateral carpal tunnel syndrome.  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  Three views of the left hand demonstrate mild basilar arthritis.    Assessment/Plan:      Bilateral carpal tunnel syndrome.  I have offered her a right carpal tunnel release.  Risks and benefits are discussed with  her in great detail.  She understood and wished to proceed.  We spoke specifically about injury to the recurrent branch of the median nerve pillar pain infection other complications      This note was created using Dragon voice recognition software that occasionally  misinterpreted phrases or words.

## 2019-11-20 DIAGNOSIS — G56.01 CARPAL TUNNEL SYNDROME ON RIGHT: ICD-10-CM

## 2019-11-20 DIAGNOSIS — G56.01 RIGHT CARPAL TUNNEL SYNDROME: Primary | ICD-10-CM

## 2019-12-03 ENCOUNTER — HOSPITAL ENCOUNTER (OUTPATIENT)
Dept: RADIOLOGY | Facility: HOSPITAL | Age: 49
Discharge: HOME | End: 2019-12-03
Attending: ORTHOPAEDIC SURGERY
Payer: MEDICAID

## 2019-12-03 ENCOUNTER — HOSPITAL ENCOUNTER (OUTPATIENT)
Dept: PREADMISSION TESTING | Facility: HOSPITAL | Age: 49
Discharge: HOME | End: 2019-12-03
Attending: ORTHOPAEDIC SURGERY
Payer: MEDICAID

## 2019-12-03 VITALS
OXYGEN SATURATION: 98 % | HEIGHT: 68 IN | SYSTOLIC BLOOD PRESSURE: 116 MMHG | BODY MASS INDEX: 35.59 KG/M2 | TEMPERATURE: 99 F | HEART RATE: 63 BPM | DIASTOLIC BLOOD PRESSURE: 75 MMHG | RESPIRATION RATE: 18 BRPM | WEIGHT: 234.81 LBS

## 2019-12-03 DIAGNOSIS — Z01.818 PRE-OP TESTING: Primary | ICD-10-CM

## 2019-12-03 DIAGNOSIS — G56.01 RIGHT CARPAL TUNNEL SYNDROME: ICD-10-CM

## 2019-12-03 PROCEDURE — 93005 ELECTROCARDIOGRAM TRACING: CPT

## 2019-12-03 PROCEDURE — 71046 X-RAY EXAM CHEST 2 VIEWS: CPT | Mod: TC

## 2019-12-03 RX ORDER — HYDROXYZINE HYDROCHLORIDE 25 MG/1
25 TABLET, FILM COATED ORAL 2 TIMES DAILY PRN
COMMUNITY

## 2019-12-03 NOTE — DISCHARGE INSTRUCTIONS
To confirm, Your doctor has instructed you that surgery is scheduled for:   December 11, 2019 Wednesday    Pre-Op will call the afternoon prior to surgery between 4:00 and 6:00 PM with the final arrival time.    December 10, 2019 Tuesday    Please report to [facility] on [address] the morning of surgery.     Do not eat or drink anything after midnight the night before your surgery - THIS INCLUDES  WATER, GUM, MINTS AND CANDY.  YOU MAY BRUSH YOUR TEETH BUT DO NOT SWALLOW     TAKE ONLY THESE MEDICATIONS WITH A SMALL SIP OF WATER THE MORNING OF YOUR PROCEDURE:  Levothyroxine      PLEASE NOTE:  The surgery schedule has many variables which may affect the time of your surgery case.  Family members should be available if your surgery time changes.  Plan to be here the day of your procedure between 4-6 hours.      DO NOT TAKE THESE MEDICATIONS 5-7 DAYS PRIOR to your procedure or per your surgeon's request: ASPIRIN, ALEVE, ADVIL, IBUPROFEN,  AMELIA SELTZER, BC , FISH OIL , VITAMIN E, HERBALS  (May take Tylenol)                                                        IMPORTANT INSTRUCTIONS      · Shower the night before AND the morning of your procedure with a Chlorhexidine wash such as Hibiclens or Dial antibacterial soap from the neck down.   ·  Do not get it on your face or in your eyes.  You may use your own shampoo and face wash. This helps your skin to be as bacteria free as possible.   ·  DO NOT remove hair from the surgery site.  Do not shave the incision site unless you are given specific instructions to do so.    · Sleep in a bed with clean sheets.  Do not sleep with a pet in the bed.   · If you wear contact lenses, dentures, hearing aids or glasses, bring a container to put them in during surgery and give to a family member for safe keeping.    · Please leave all jewelry, piercing's and valuables at home.   · ONLY if you have been diagnosed with sleep apnea please bring your C-PAP machine.    · Make  arrangements in advance for transportation home by a responsible adult.      · You must make arrangements for transportation, TAXI'S, UBER'S OR LYFTS ARE NOT ALLOWED.        If you have any questions about these instructions, call Pre-Op Admit  Nursing at [phone] or the Pre-Op Day Surgery Unit at [phone].

## 2019-12-11 ENCOUNTER — ANESTHESIA (OUTPATIENT)
Dept: SURGERY | Facility: HOSPITAL | Age: 49
End: 2019-12-11
Payer: MEDICAID

## 2019-12-11 ENCOUNTER — HOSPITAL ENCOUNTER (OUTPATIENT)
Facility: HOSPITAL | Age: 49
Discharge: HOME | End: 2019-12-11
Attending: ORTHOPAEDIC SURGERY | Admitting: ORTHOPAEDIC SURGERY
Payer: MEDICAID

## 2019-12-11 ENCOUNTER — ANESTHESIA EVENT (OUTPATIENT)
Dept: SURGERY | Facility: HOSPITAL | Age: 49
End: 2019-12-11
Payer: MEDICAID

## 2019-12-11 VITALS
HEART RATE: 66 BPM | OXYGEN SATURATION: 97 % | WEIGHT: 234.81 LBS | DIASTOLIC BLOOD PRESSURE: 81 MMHG | RESPIRATION RATE: 18 BRPM | TEMPERATURE: 98 F | BODY MASS INDEX: 35.7 KG/M2 | SYSTOLIC BLOOD PRESSURE: 132 MMHG

## 2019-12-11 DIAGNOSIS — Z01.818 PRE-OP TESTING: ICD-10-CM

## 2019-12-11 DIAGNOSIS — G56.01 RIGHT CARPAL TUNNEL SYNDROME: ICD-10-CM

## 2019-12-11 DIAGNOSIS — G56.01 CARPAL TUNNEL SYNDROME ON RIGHT: Primary | ICD-10-CM

## 2019-12-11 PROCEDURE — 27000673 HC TUBING BLOOD Y: Performed by: ANESTHESIOLOGY

## 2019-12-11 PROCEDURE — 25000003 PHARM REV CODE 250: Performed by: ANESTHESIOLOGY

## 2019-12-11 PROCEDURE — 63600175 PHARM REV CODE 636 W HCPCS: Performed by: ORTHOPAEDIC SURGERY

## 2019-12-11 PROCEDURE — 37000008 HC ANESTHESIA 1ST 15 MINUTES: Performed by: ORTHOPAEDIC SURGERY

## 2019-12-11 PROCEDURE — 27000671 HC TUBING MICROBORE EXT: Performed by: ANESTHESIOLOGY

## 2019-12-11 PROCEDURE — 36000706: Performed by: ORTHOPAEDIC SURGERY

## 2019-12-11 PROCEDURE — 25000003 PHARM REV CODE 250: Performed by: ORTHOPAEDIC SURGERY

## 2019-12-11 PROCEDURE — 36000707: Performed by: ORTHOPAEDIC SURGERY

## 2019-12-11 PROCEDURE — 25000003 PHARM REV CODE 250: Performed by: NURSE ANESTHETIST, CERTIFIED REGISTERED

## 2019-12-11 PROCEDURE — 27000284 HC CANNULA NASAL: Performed by: ANESTHESIOLOGY

## 2019-12-11 PROCEDURE — 37000009 HC ANESTHESIA EA ADD 15 MINS: Performed by: ORTHOPAEDIC SURGERY

## 2019-12-11 PROCEDURE — S0077 INJECTION, CLINDAMYCIN PHOSP: HCPCS | Performed by: NURSE ANESTHETIST, CERTIFIED REGISTERED

## 2019-12-11 PROCEDURE — 01810 ANES PX NRV MUSC F/ARM WRST: CPT | Performed by: ORTHOPAEDIC SURGERY

## 2019-12-11 PROCEDURE — 63600175 PHARM REV CODE 636 W HCPCS: Performed by: NURSE ANESTHETIST, CERTIFIED REGISTERED

## 2019-12-11 PROCEDURE — 27201423 OPTIME MED/SURG SUP & DEVICES STERILE SUPPLY: Performed by: ORTHOPAEDIC SURGERY

## 2019-12-11 PROCEDURE — 71000015 HC POSTOP RECOV 1ST HR: Performed by: ORTHOPAEDIC SURGERY

## 2019-12-11 RX ORDER — CLINDAMYCIN PHOSPHATE 900 MG/50ML
INJECTION, SOLUTION INTRAVENOUS
Status: DISCONTINUED | OUTPATIENT
Start: 2019-12-11 | End: 2019-12-11

## 2019-12-11 RX ORDER — ONDANSETRON 2 MG/ML
INJECTION INTRAMUSCULAR; INTRAVENOUS
Status: DISCONTINUED | OUTPATIENT
Start: 2019-12-11 | End: 2019-12-11

## 2019-12-11 RX ORDER — FENTANYL CITRATE 50 UG/ML
25 INJECTION, SOLUTION INTRAMUSCULAR; INTRAVENOUS
Status: DISCONTINUED | OUTPATIENT
Start: 2019-12-11 | End: 2019-12-11 | Stop reason: HOSPADM

## 2019-12-11 RX ORDER — ONDANSETRON 4 MG/1
4 TABLET, ORALLY DISINTEGRATING ORAL ONCE AS NEEDED
Status: DISCONTINUED | OUTPATIENT
Start: 2019-12-11 | End: 2019-12-11 | Stop reason: HOSPADM

## 2019-12-11 RX ORDER — PROPOFOL 10 MG/ML
VIAL (ML) INTRAVENOUS
Status: DISCONTINUED | OUTPATIENT
Start: 2019-12-11 | End: 2019-12-11

## 2019-12-11 RX ORDER — OXYCODONE AND ACETAMINOPHEN 7.5; 325 MG/1; MG/1
1 TABLET ORAL EVERY 4 HOURS PRN
Qty: 20 TABLET | Refills: 0 | Status: SHIPPED | OUTPATIENT
Start: 2019-12-11 | End: 2019-12-19 | Stop reason: SDUPTHER

## 2019-12-11 RX ORDER — OXYCODONE HYDROCHLORIDE 5 MG/1
5 TABLET ORAL
Status: DISCONTINUED | OUTPATIENT
Start: 2019-12-11 | End: 2019-12-11 | Stop reason: HOSPADM

## 2019-12-11 RX ORDER — ACETAMINOPHEN 500 MG
1000 TABLET ORAL
Status: DISCONTINUED | OUTPATIENT
Start: 2019-12-11 | End: 2019-12-11 | Stop reason: HOSPADM

## 2019-12-11 RX ORDER — CELECOXIB 100 MG/1
100 CAPSULE ORAL ONCE
Status: DISCONTINUED | OUTPATIENT
Start: 2019-12-11 | End: 2019-12-11 | Stop reason: HOSPADM

## 2019-12-11 RX ORDER — OXYCODONE HYDROCHLORIDE 5 MG/1
10 TABLET ORAL ONCE AS NEEDED
Status: COMPLETED | OUTPATIENT
Start: 2019-12-11 | End: 2019-12-11

## 2019-12-11 RX ORDER — SODIUM CHLORIDE 0.9 % (FLUSH) 0.9 %
10 SYRINGE (ML) INJECTION
Status: DISCONTINUED | OUTPATIENT
Start: 2019-12-11 | End: 2019-12-11 | Stop reason: HOSPADM

## 2019-12-11 RX ORDER — FENTANYL CITRATE 50 UG/ML
INJECTION, SOLUTION INTRAMUSCULAR; INTRAVENOUS
Status: DISCONTINUED | OUTPATIENT
Start: 2019-12-11 | End: 2019-12-11

## 2019-12-11 RX ORDER — MIDAZOLAM HYDROCHLORIDE 1 MG/ML
INJECTION INTRAMUSCULAR; INTRAVENOUS
Status: DISCONTINUED | OUTPATIENT
Start: 2019-12-11 | End: 2019-12-11

## 2019-12-11 RX ORDER — SODIUM CHLORIDE 0.9 G/100ML
IRRIGANT IRRIGATION
Status: DISCONTINUED | OUTPATIENT
Start: 2019-12-11 | End: 2019-12-11 | Stop reason: HOSPADM

## 2019-12-11 RX ORDER — DIPHENHYDRAMINE HYDROCHLORIDE 50 MG/ML
6.25 INJECTION INTRAMUSCULAR; INTRAVENOUS
Status: DISCONTINUED | OUTPATIENT
Start: 2019-12-11 | End: 2019-12-11 | Stop reason: HOSPADM

## 2019-12-11 RX ORDER — SODIUM CHLORIDE, SODIUM LACTATE, POTASSIUM CHLORIDE, CALCIUM CHLORIDE 600; 310; 30; 20 MG/100ML; MG/100ML; MG/100ML; MG/100ML
INJECTION, SOLUTION INTRAVENOUS CONTINUOUS PRN
Status: DISCONTINUED | OUTPATIENT
Start: 2019-12-11 | End: 2019-12-11

## 2019-12-11 RX ORDER — ONDANSETRON 2 MG/ML
4 INJECTION INTRAMUSCULAR; INTRAVENOUS DAILY PRN
Status: DISCONTINUED | OUTPATIENT
Start: 2019-12-11 | End: 2019-12-11 | Stop reason: HOSPADM

## 2019-12-11 RX ORDER — OXYCODONE HYDROCHLORIDE 5 MG/1
5 TABLET ORAL EVERY 4 HOURS PRN
Status: DISCONTINUED | OUTPATIENT
Start: 2019-12-11 | End: 2019-12-11 | Stop reason: HOSPADM

## 2019-12-11 RX ORDER — LIDOCAINE HYDROCHLORIDE AND EPINEPHRINE 5; 5 MG/ML; UG/ML
INJECTION, SOLUTION INFILTRATION; PERINEURAL
Status: COMPLETED | OUTPATIENT
Start: 2019-12-11 | End: 2019-12-11

## 2019-12-11 RX ADMIN — OXYCODONE HYDROCHLORIDE 10 MG: 5 TABLET ORAL at 08:12

## 2019-12-11 RX ADMIN — FENTANYL CITRATE 50 MCG: 50 INJECTION INTRAMUSCULAR; INTRAVENOUS at 07:12

## 2019-12-11 RX ADMIN — PROPOFOL 30 MG: 10 INJECTION, EMULSION INTRAVENOUS at 07:12

## 2019-12-11 RX ADMIN — ONDANSETRON 4 MG: 2 INJECTION INTRAMUSCULAR; INTRAVENOUS at 07:12

## 2019-12-11 RX ADMIN — MIDAZOLAM HYDROCHLORIDE 2 MG: 1 INJECTION, SOLUTION INTRAMUSCULAR; INTRAVENOUS at 07:12

## 2019-12-11 RX ADMIN — CLINDAMYCIN IN 5 PERCENT DEXTROSE 900 MG: 18 INJECTION, SOLUTION INTRAVENOUS at 07:12

## 2019-12-11 RX ADMIN — SODIUM CHLORIDE, SODIUM LACTATE, POTASSIUM CHLORIDE, AND CALCIUM CHLORIDE: .6; .31; .03; .02 INJECTION, SOLUTION INTRAVENOUS at 07:12

## 2019-12-11 RX ADMIN — LIDOCAINE HYDROCHLORIDE AND EPINEPHRINE 50 ML: 5; 5 INJECTION, SOLUTION INFILTRATION; PERINEURAL at 07:12

## 2019-12-11 RX ADMIN — VANCOMYCIN HYDROCHLORIDE 2000 MG: 1 INJECTION, POWDER, LYOPHILIZED, FOR SOLUTION INTRAVENOUS at 06:12

## 2019-12-11 NOTE — OP NOTE
[facility]  Surgery Department  Operative Note    SUMMARY     Date of Procedure: 12/11/2019     Procedure:  Right carpal tunnel release    Surgeon(s) and Role:     * Alfredo Blanc MD - Primary    Assisting Surgeon: none    Pre-Operative Diagnosis: Right carpal tunnel syndrome [G56.01]    Post-Operative Diagnosis: Post-Op Diagnosis Codes:     * Right carpal tunnel syndrome [G56.01]    Anesthesia: Choice    Intraoperative Findings:     Description of the Findings of the Procedure:  The patient was placed on the operating table in a supine position.  The right hand was prepped and draped in the usual sterile manner for surgery.  An incision was made in line with the 3rd ray.  Was carried down sharply through the skin.  The longitudinal fibers the palmaris fascia visualized and divided.  The transverse carpal ligament was easily visualized.  A small rent was made in the transverse carpal ligament and a knife was used to cut down on to a hemostat inserted underneath the transverse carpal ligament.  We now divided the most proximal and distal aspects of the transverse carpal ligament using Metzenbaum scissors.  We cut directly what we could only visualized.  All cutting was done on the median border of the ulnar nerve.  We copiously irrigated.  We closed the skin with 4 0 nylons.  Patient was noted to be in stable condition.    Complications: No    Estimated Blood Loss (EBL): * No values recorded between 12/11/2019  7:46 AM and 12/11/2019  7:53 AM *           Implants: * No implants in log *    Specimens:   Specimen (12h ago, onward)    None                  Condition: Good    Disposition: PACU - hemodynamically stable.              Discharge Note    SUMMARY     Admit Date: 12/11/2019    Discharge Date and Time:  12/11/2019 7:53 AM    Hospital Course (synopsis of major diagnoses, care, treatment, and services provided during the course of the hospital stay): Uneventful      Final Diagnosis: Post-Op  Diagnosis Codes:     * Right carpal tunnel syndrome [G56.01]    Disposition: Home or Self Care    Follow Up/Patient Instructions:     Medications:  Reconciled Home Medications:   Current Discharge Medication List      CONTINUE these medications which have NOT CHANGED    Details   atorvastatin (LIPITOR) 20 MG tablet Take 20 mg by mouth once daily.       biotin 10,000 mcg Cap Take 10,000 mEq by mouth once daily.       carbidopa-levodopa  mg (SINEMET)  mg per tablet Take 1 tablet by mouth 3 (three) times daily.       escitalopram oxalate (LEXAPRO) 10 MG tablet Take 20 mg by mouth once daily.       fish oil-omega-3 fatty acids 300-1,000 mg capsule Take 1 capsule by mouth once daily.       hydrOXYzine HCl (ATARAX) 25 MG tablet Take 25 mg by mouth 2 (two) times daily as needed for Itching.      levothyroxine (SYNTHROID) 88 MCG tablet Take 88 mcg by mouth once daily.      multivitamin (THERAGRAN) per tablet Take 1 tablet by mouth once daily.       pramipexole (MIRAPEX) 0.125 MG tablet Take 0.125 mg by mouth 3 (three) times daily.       solifenacin (VESICARE) 5 MG tablet TAKE 1 TABLET BY MOUTH EVERY DAY  Qty: 30 tablet, Refills: 6      traZODone (DESYREL) 150 MG tablet Take 150 mg by mouth nightly.   Refills: 1         STOP taking these medications       traMADol (ULTRAM) 50 mg tablet Comments:   Reason for Stopping:             No discharge procedures on file.

## 2019-12-11 NOTE — ANESTHESIA PREPROCEDURE EVALUATION
12/11/2019  Sabrina Black is a 49 y.o., female.  Patient Active Problem List   Diagnosis    Total knee replacement status, right    Knee pain, right    Gait abnormality    Left wrist pain    Right wrist pain    Carpal tunnel syndrome on right       Past Surgical History:   Procedure Laterality Date    ABLATION  1999    CHOLECYSTECTOMY  08/27/2018    Dr. Petersen     knee and back surgery  1994    Back surgery 1994    KNEE SURGERY Right 06/2019    LAPAROSCOPIC GASTRIC BANDING  2014    Gastric sleeve    NECK SURGERY  2004    neck     REMOVAL OF BONE SPUR OF FOOT Right 2010    TOTAL KNEE ARTHROPLASTY Right 06/05/2019        Tobacco Use:  The patient  reports that she quit smoking about 15 years ago. Her smoking use included cigarettes. She has a 20.00 pack-year smoking history. She has never used smokeless tobacco.     Results for orders placed or performed during the hospital encounter of 12/03/19   EKG 12-lead    Collection Time: 12/03/19 11:06 AM    Narrative    Test Reason : G56.01,    Vent. Rate : 056 BPM     Atrial Rate : 056 BPM     P-R Int : 164 ms          QRS Dur : 072 ms      QT Int : 416 ms       P-R-T Axes : 051 071 049 degrees     QTc Int : 401 ms    Sinus bradycardia  Otherwise normal ECG  No previous ECGs available  Confirmed by Bryn Riley MD ([id number]) on 12/4/2019 8:59:33 AM    Referred By:  FINGER           Confirmed By:Bryn Riley MD             Lab Results   Component Value Date    WBC 8.03 12/03/2019    HGB 13.3 12/03/2019    HCT 41.8 12/03/2019    MCV 89 12/03/2019     12/03/2019     BMP  Lab Results   Component Value Date     12/03/2019    K 4.1 12/03/2019     12/03/2019    CO2 30 (H) 12/03/2019    BUN 9 12/03/2019    CREATININE 0.8 12/03/2019    CALCIUM 9.1 12/03/2019    ANIONGAP 7 (L) 12/03/2019    ESTGFRAFRICA >60.0 12/03/2019    EGFRNONAA  >60.0 12/03/2019         Anesthesia Evaluation    I have reviewed the Patient Summary Reports.    I have reviewed the Nursing Notes.   I have reviewed the Medications.     Review of Systems  Anesthesia Hx:  No problems with previous Anesthesia Hx of Anesthetic complications  History of prior surgery of interest to airway management or planning: gastric bypass. Previous anesthesia: General Denies Family Hx of Anesthesia complications.   Denies Personal Hx of Anesthesia complications.   Social:  Former Smoker, No Alcohol Use    Hematology/Oncology:  Hematology Normal   Oncology Normal     EENT/Dental:EENT/Dental Normal   Cardiovascular:  Cardiovascular Normal  ECG has been reviewed.    Pulmonary:  Pulmonary Normal    Renal/:  Renal/ Normal     Hepatic/GI:  Hepatic/GI Normal    Musculoskeletal:   Arthritis  Patient denies radicular pain or neuropathy at this time Spine Disorders: lumbar    Neurological:   Neuromuscular Disease,  Neuromuscular Disease Parkinson's   Endocrine:   Diabetes, well controlled, type 2 Pt. Without medicines at this time   Psych:   Psychiatric History Bipolar         Physical Exam  General:  Obesity    Airway/Jaw/Neck:  Airway Findings: Mouth Opening: Normal Tongue: Normal  General Airway Assessment: Adult  Mallampati: III  TM Distance: < 4 cm  Jaw/Neck Findings:  Neck ROM: Normal ROM      Dental:  Dental Findings: Upper Dentures, Lower Dentures   Chest/Lungs:  Chest/Lungs Findings: Clear to auscultation, Normal Respiratory Rate     Heart/Vascular:  Heart Findings: Rate: Normal  Rhythm: Regular Rhythm  Sounds: Normal  Heart murmur: negative Vascular Findings: Normal    Abdomen:  Abdomen Findings: Normal    Musculoskeletal:  Musculoskeletal Findings: Normal   Skin:  Skin Findings: Normal    Mental Status:  Mental Status Findings:  Cooperative, Alert and Oriented         Anesthesia Plan  Type of Anesthesia, risks & benefits discussed:  Anesthesia Type:  regional  Patient's Preference: Carlitos  Block  Intra-op Monitoring Plan: standard ASA monitors  Intra-op Monitoring Plan Comments:   Post Op Pain Control Plan: multimodal analgesia, IV/PO Opioids PRN and per primary service following discharge from PACU  Post Op Pain Control Plan Comments:   Induction:    Beta Blocker:  Patient is not currently on a Beta-Blocker (No further documentation required).       Informed Consent: Patient understands risks and agrees with Anesthesia plan.  Questions answered. Anesthesia consent signed with patient.  ASA Score: 3     Day of Surgery Review of History & Physical:        Anesthesia Plan Notes: Carlitos Block  Zofran 4mg iv  [id number] mg po  Celebrex 100mg po        Ready For Surgery From Anesthesia Perspective.

## 2019-12-11 NOTE — ANESTHESIA PROCEDURE NOTES
Peripheral Block    Patient location during procedure: OR    Reason for block: primary anesthetic   Diagnosis: carpal tunnel   Start time: 12/11/2019 7:36 AM  Timeout: 12/11/2019 7:35 AM   End time: 12/11/2019 7:38 AM    Staffing  Authorizing Provider: Gopal Armenta Jr., MD  Performing Provider: Gopal Armenta Jr., MD    Preanesthetic Checklist  Completed: patient identified, site marked, surgical consent, pre-op evaluation, timeout performed, IV checked, risks and benefits discussed and monitors and equipment checked  Peripheral Block  Patient position: supine  Patient monitoring: heart rate, cardiac monitor, continuous capnometry, continuous pulse ox and frequent blood pressure checks  Block type: [place] block  Laterality: right  Injection technique: single shot  Needle  Needle localization: anatomical landmarks     Assessment  Slow fractionated injection: yes  Additional Notes  Carlitos block performed without difficulty with Lidocaine 0.5% PF for a total of 50 ccs. Pt tolerated procedure well.

## 2019-12-11 NOTE — INTERVAL H&P NOTE
The patient has been examined and the H&P has been reviewed:    I concur with the findings and no changes have occurred since H&P was written.    Anesthesia/Surgery risks, benefits and alternative options discussed and understood by patient/family.          Active Hospital Problems    Diagnosis  POA    Carpal tunnel syndrome on right [G56.01]  Yes      Resolved Hospital Problems   No resolved problems to display.

## 2019-12-11 NOTE — ANESTHESIA POSTPROCEDURE EVALUATION
Anesthesia Post Evaluation    Patient: Sabrina Black    Procedure(s) Performed: Procedure(s) (LRB):  RELEASE, CARPAL TUNNEL (Right)    Final Anesthesia Type: regional and MAC    Patient location during evaluation: OPS  Patient participation: Yes- Able to Participate  Level of consciousness: awake and alert, oriented and awake  Post-procedure vital signs: reviewed and stable  Pain management: adequate  Airway patency: patent    PONV status at discharge: No PONV  Anesthetic complications: no      Cardiovascular status: blood pressure returned to baseline, hemodynamically stable and stable  Respiratory status: unassisted, spontaneous ventilation and room air  Hydration status: euvolemic  Follow-up not needed.          Vitals Value Taken Time   /65 12/11/2019  5:52 AM   Temp 36.9 °C (98.5 °F) 12/11/2019  5:49 AM   Pulse 64 12/11/2019  5:49 AM   Resp 18 12/11/2019  5:49 AM   SpO2 96 % 12/11/2019  5:49 AM         No case tracking events are documented in the log.      Pain/Anthony Score: No data recorded

## 2019-12-11 NOTE — DISCHARGE INSTRUCTIONS
DISCHARGE INSTRUCTIONS    No driving, operating heavy machinery or signing legal documents x 24 hours  No drinking alcohol x 24 hours or while taking pain medication  You should not be driving while taking pain medication  You should not be running a temp greater than 101   Do not get dressing wet    Discharge Instructions: After Your Surgery  Youve just had surgery. During surgery, you were given medicine called anesthesia to keep you relaxed and free of pain. After surgery, you may have some pain or nausea. This is common. Here are some tips for feeling better and getting well after surgery.     Stay on schedule with your medicine.   Going home  Your healthcare provider will show you how to take care of yourself when you go home. He or she will also answer your questions. Have an adult family member or friend drive you home. For the first 24 hours after your surgery:  · Do not drive or use heavy equipment.  · Do not make important decisions or sign legal papers.  · Do not drink alcohol.  · Have someone stay with you, if needed. He or she can watch for problems and help keep you safe.  Be sure to go to all follow-up visits with your healthcare provider. And rest after your surgery for as long as your healthcare provider tells you to.  Coping with pain  If you have pain after surgery, pain medicine will help you feel better. Take it as told, before pain becomes severe. Also, ask your healthcare provider or pharmacist about other ways to control pain. This might be with heat, ice, or relaxation. And follow any other instructions your surgeon or nurse gives you.  Tips for taking pain medicine  To get the best relief possible, remember these points:  · Pain medicines can upset your stomach. Taking them with a little food may help.  · Most pain relievers taken by mouth need at least 20 to 30 minutes to start to work.  · Taking medicine on a schedule can help you remember to take it. Try to time your medicine so that  you can take it before starting an activity. This might be before you get dressed, go for a walk, or sit down for dinner.  · Constipation is a common side effect of pain medicines. Call your healthcare provider before taking any medicines such as laxatives or stool softeners to help ease constipation. Also ask if you should skip any foods. Drinking lots of fluids and eating foods such as fruits and vegetables that are high in fiber can also help. Remember, do not take laxatives unless your surgeon has prescribed them.  · Drinking alcohol and taking pain medicine can cause dizziness and slow your breathing. It can even be deadly. Do not drink alcohol while taking pain medicine.  · Pain medicine can make you react more slowly to things. Do not drive or run machinery while taking pain medicine.  Your healthcare provider may tell you to take acetaminophen to help ease your pain. Ask him or her how much you are supposed to take each day. Acetaminophen or other pain relievers may interact with your prescription medicines or other over-the-counter (OTC) medicines. Some prescription medicines have acetaminophen and other ingredients. Using both prescription and OTC acetaminophen for pain can cause you to overdose. Read the labels on your OTC medicines with care. This will help you to clearly know the list of ingredients, how much to take, and any warnings. It may also help you not take too much acetaminophen. If you have questions or do not understand the information, ask your pharmacist or healthcare provider to explain it to you before you take the OTC medicine.  Managing nausea  Some people have an upset stomach after surgery. This is often because of anesthesia, pain, or pain medicine, or the stress of surgery. These tips will help you handle nausea and eat healthy foods as you get better. If you were on a special food plan before surgery, ask your healthcare provider if you should follow it while you get better. These  tips may help:  · Do not push yourself to eat. Your body will tell you when to eat and how much.  · Start off with clear liquids and soup. They are easier to digest.  · Next try semi-solid foods, such as mashed potatoes, applesauce, and gelatin, as you feel ready.  · Slowly move to solid foods. Dont eat fatty, rich, or spicy foods at first.  · Do not force yourself to have 3 large meals a day. Instead eat smaller amounts more often.  · Take pain medicines with a small amount of solid food, such as crackers or toast, to avoid nausea.     Call your surgeon if  · You still have pain an hour after taking medicine. The medicine may not be strong enough.  · You feel too sleepy, dizzy, or groggy. The medicine may be too strong.  · You have side effects like nausea, vomiting, or skin changes, such as rash, itching, or hives.       If you have obstructive sleep apnea  You were given anesthesia medicine during surgery to keep you comfortable and free of pain. After surgery, you may have more apnea spells because of this medicine and other medicines you were given. The spells may last longer than usual.   At home:  · Keep using the continuous positive airway pressure (CPAP) device when you sleep. Unless your healthcare provider tells you not to, use it when you sleep, day or night. CPAP is a common device used to treat obstructive sleep apnea.  · Talk with your provider before taking any pain medicine, muscle relaxants, or sedatives. Your provider will tell you about the possible dangers of taking these medicines.  Date Last Reviewed: 12/1/2016 © 2000-2017 [employer]. [address] [place] [address], [place] [address]. All rights reserved. This information is not intended as a substitute for professional medical care. Always follow your healthcare professional's instructions.

## 2019-12-11 NOTE — TRANSFER OF CARE
Anesthesia Transfer of Care Note    Patient: Sabrina Black    Procedure(s) Performed: Procedure(s) (LRB):  RELEASE, CARPAL TUNNEL (Right)    Patient location: OPS    Anesthesia Type: regional    Transport from OR: Transported from OR on room air with adequate spontaneous ventilation    Post pain: adequate analgesia    Post assessment: no apparent anesthetic complications    Post vital signs: stable    Level of consciousness: awake and alert    Nausea/Vomiting: no nausea/vomiting    Complications: none    Transfer of care protocol was followed      Last vitals:   Visit Vitals  /74 (BP Location: Left arm, Patient Position: Lying)   Pulse (!) 58   Temp 36.1 °C (97 °F) (Oral)   Resp 14   Wt 106.5 kg (234 lb 12.6 oz)   LMP 12/01/2014   SpO2 98%   Breastfeeding? No   BMI 35.70 kg/m²

## 2019-12-19 ENCOUNTER — OFFICE VISIT (OUTPATIENT)
Dept: ORTHOPEDICS | Facility: CLINIC | Age: 49
End: 2019-12-19
Payer: MEDICAID

## 2019-12-19 VITALS — BODY MASS INDEX: 34.97 KG/M2 | WEIGHT: 230 LBS | DIASTOLIC BLOOD PRESSURE: 70 MMHG | SYSTOLIC BLOOD PRESSURE: 118 MMHG

## 2019-12-19 DIAGNOSIS — Z98.890 S/P CARPAL TUNNEL RELEASE: Primary | ICD-10-CM

## 2019-12-19 PROCEDURE — 99024 PR POST-OP FOLLOW-UP VISIT: ICD-10-PCS | Mod: S$GLB,,, | Performed by: ORTHOPAEDIC SURGERY

## 2019-12-19 PROCEDURE — 99024 POSTOP FOLLOW-UP VISIT: CPT | Mod: S$GLB,,, | Performed by: ORTHOPAEDIC SURGERY

## 2019-12-19 RX ORDER — OXYCODONE AND ACETAMINOPHEN 7.5; 325 MG/1; MG/1
1 TABLET ORAL EVERY 8 HOURS PRN
Qty: 21 TABLET | Refills: 0 | Status: SHIPPED | OUTPATIENT
Start: 2019-12-19 | End: 2019-12-26

## 2019-12-19 RX ORDER — ESCITALOPRAM OXALATE 20 MG/1
TABLET ORAL
COMMUNITY
Start: 2019-12-18

## 2019-12-19 NOTE — PROGRESS NOTES
"[facility] POST-OP NOTE    Subjective:           Chief Complaint:   Chief Complaint   Patient presents with    Right Hand - Post-op Evaluation     sutures out Right  CTR on 12/11/19. She has moderate pain.       Past Medical History:   Diagnosis Date    Bipolar 1 disorder 2011    Diabetes mellitus, type 2     pt states "no longer diabetic since I lost 100lbs"    Hyperlipidemia 2017    Knee injury 2011    Lumbar pain 1994    herniated disc     Parkinson's disease 2016    Thyroid disease 2014       Past Surgical History:   Procedure Laterality Date    ABLATION  1999    CARPAL TUNNEL RELEASE Right 12/11/2019    Procedure: RELEASE, CARPAL TUNNEL;  Surgeon: Alfredo Blanc MD;  Location: [facility];  Service: Orthopedics;  Laterality: Right;    CHOLECYSTECTOMY  08/27/2018    Dr. Petersen     knee and back surgery  1994    Back surgery 1994    KNEE SURGERY Right 06/2019    LAPAROSCOPIC GASTRIC BANDING  2014    Gastric sleeve    NECK SURGERY  2004    neck     REMOVAL OF BONE SPUR OF FOOT Right 2010    TOTAL KNEE ARTHROPLASTY Right 06/05/2019       Current Outpatient Medications   Medication Sig    atorvastatin (LIPITOR) 20 MG tablet Take 20 mg by mouth once daily.     biotin 10,000 mcg Cap Take 10,000 mEq by mouth once daily.     carbidopa-levodopa  mg (SINEMET)  mg per tablet Take 1 tablet by mouth 3 (three) times daily.     escitalopram oxalate (LEXAPRO) 10 MG tablet Take 20 mg by mouth once daily.     escitalopram oxalate (LEXAPRO) 20 MG tablet     fish oil-omega-3 fatty acids 300-1,000 mg capsule Take 1 capsule by mouth once daily.     hydrOXYzine HCl (ATARAX) 25 MG tablet Take 25 mg by mouth 2 (two) times daily as needed for Itching.    levothyroxine (SYNTHROID) 88 MCG tablet Take 88 mcg by mouth once daily.    multivitamin (THERAGRAN) per tablet Take 1 tablet by mouth once daily.     oxyCODONE-acetaminophen (PERCOCET) 7.5-325 mg per tablet Take 1 tablet by mouth every 4 " (four) hours as needed for Pain.    pramipexole (MIRAPEX) 0.125 MG tablet Take 0.125 mg by mouth 3 (three) times daily.     solifenacin (VESICARE) 5 MG tablet TAKE 1 TABLET BY MOUTH EVERY DAY (Patient taking differently: Take 5 mg by mouth once daily. )    traZODone (DESYREL) 150 MG tablet Take 150 mg by mouth nightly.      No current facility-administered medications for this visit.        Review of patient's allergies indicates:   Allergen Reactions    Pcn [penicillins] Hives    Morphine Hallucinations       Family History   Problem Relation Age of Onset    Cancer Father     Diabetes Father     Hyperlipidemia Father     Cancer Paternal Aunt     Cancer Maternal Grandfather     Hyperlipidemia Maternal Grandfather     Hyperlipidemia Paternal Grandmother     Hyperlipidemia Paternal Grandfather        Social History     Socioeconomic History    Marital status:      Spouse name: Not on file    Number of children: Not on file    Years of education: Not on file    Highest education level: Not on file   Occupational History    Not on file   Social Needs    Financial resource strain: Not on file    Food insecurity:     Worry: Not on file     Inability: Not on file    Transportation needs:     Medical: Not on file     Non-medical: Not on file   Tobacco Use    Smoking status: Former Smoker     Packs/day: 1.00     Years: 20.00     Pack years: 20.00     Types: Cigarettes     Last attempt to quit: 2004     Years since quitting: 15.9    Smokeless tobacco: Never Used   Substance and Sexual Activity    Alcohol use: No     Frequency: Never    Drug use: No    Sexual activity: Not Currently   Lifestyle    Physical activity:     Days per week: Not on file     Minutes per session: Not on file    Stress: Not on file   Relationships    Social connections:     Talks on phone: Not on file     Gets together: Not on file     Attends Tenriism service: Not on file     Active member of club or organization:  Not on file     Attends meetings of clubs or organizations: Not on file     Relationship status: Not on file   Other Topics Concern    Not on file   Social History Narrative    Not on file       History of present illness:  Patient comes in today for the right hand.  She is doing very well status post carpal tunnel release.  Complains of some pain in the palm      Review of Systems:    Musculoskeletal:  See HPI      Objective:        Physical Examination:    Vital Signs:    Vitals:    12/19/19 0739   BP: 118/70       Body mass index is 34.97 kg/m².    This a well-developed, well nourished patient in no acute distress.  They are alert and oriented and cooperative to examination.        Patient has full range of motion of the fingers and thumb.  Good of opposition of the thumb  Pertinent New Results:    XRAY Report / Interpretation:       Assessment/Plan:      Status post carpal tunnel release.  Follow up in 1 month for range of motion check    This note was created using Dragon voice recognition software that occasionally misinterpreted phrases or words.

## 2020-01-28 ENCOUNTER — OFFICE VISIT (OUTPATIENT)
Dept: ORTHOPEDICS | Facility: CLINIC | Age: 50
End: 2020-01-28
Payer: MEDICAID

## 2020-01-28 VITALS
DIASTOLIC BLOOD PRESSURE: 80 MMHG | SYSTOLIC BLOOD PRESSURE: 127 MMHG | HEIGHT: 68 IN | HEART RATE: 75 BPM | BODY MASS INDEX: 34.86 KG/M2 | WEIGHT: 230 LBS

## 2020-01-28 DIAGNOSIS — M17.12 PRIMARY OSTEOARTHRITIS OF LEFT KNEE: ICD-10-CM

## 2020-01-28 DIAGNOSIS — Z98.890 S/P CARPAL TUNNEL RELEASE: Primary | ICD-10-CM

## 2020-01-28 DIAGNOSIS — Z96.651 HISTORY OF TOTAL KNEE ARTHROPLASTY, RIGHT: ICD-10-CM

## 2020-01-28 PROCEDURE — 99214 OFFICE O/P EST MOD 30 MIN: CPT | Mod: 25,24,S$GLB, | Performed by: ORTHOPAEDIC SURGERY

## 2020-01-28 PROCEDURE — 20610 LARGE JOINT ASPIRATION/INJECTION: L KNEE: ICD-10-PCS | Mod: 79,LT,S$GLB, | Performed by: ORTHOPAEDIC SURGERY

## 2020-01-28 PROCEDURE — 99024 POSTOP FOLLOW-UP VISIT: CPT | Mod: S$GLB,,, | Performed by: ORTHOPAEDIC SURGERY

## 2020-01-28 PROCEDURE — 20610 DRAIN/INJ JOINT/BURSA W/O US: CPT | Mod: 79,LT,S$GLB, | Performed by: ORTHOPAEDIC SURGERY

## 2020-01-28 PROCEDURE — 99024 PR POST-OP FOLLOW-UP VISIT: ICD-10-PCS | Mod: S$GLB,,, | Performed by: ORTHOPAEDIC SURGERY

## 2020-01-28 PROCEDURE — 99214 PR OFFICE/OUTPT VISIT, EST, LEVL IV, 30-39 MIN: ICD-10-PCS | Mod: 25,24,S$GLB, | Performed by: ORTHOPAEDIC SURGERY

## 2020-01-28 RX ORDER — METHYLPREDNISOLONE ACETATE 40 MG/ML
40 INJECTION, SUSPENSION INTRA-ARTICULAR; INTRALESIONAL; INTRAMUSCULAR; SOFT TISSUE
Status: DISCONTINUED | OUTPATIENT
Start: 2020-01-28 | End: 2020-01-28 | Stop reason: HOSPADM

## 2020-01-28 RX ORDER — TRAMADOL HYDROCHLORIDE 50 MG/1
TABLET ORAL
COMMUNITY
Start: 2020-01-14

## 2020-01-28 RX ADMIN — METHYLPREDNISOLONE ACETATE 40 MG: 40 INJECTION, SUSPENSION INTRA-ARTICULAR; INTRALESIONAL; INTRAMUSCULAR; SOFT TISSUE at 07:01

## 2020-01-28 NOTE — PROCEDURES
Large Joint Aspiration/Injection: L knee  Date/Time: 1/28/2020 7:45 AM  Performed by: Alfredo Blanc MD  Authorized by: Alfredo Blanc MD     Consent Done?:  Yes (Verbal)  Indications:  Pain  Procedure site marked: Yes    Timeout: Prior to procedure the correct patient, procedure, and site was verified    Anesthesia  Local anesthesia used  Anesthesia: local infiltration  Anesthetic: lidocaine 1% without epinephrine    Location:  Knee  Site:  L knee  Prep: Patient was prepped and draped in usual sterile fashion    Needle size:  25 G  Medications:  40 mg methylPREDNISolone acetate 40 mg/mL; 40 mg methylPREDNISolone acetate 40 mg/mL  Patient tolerance:  Patient tolerated the procedure well with no immediate complications

## 2020-01-28 NOTE — PROGRESS NOTES
"[facility] POST-OP NOTE    Subjective:           Chief Complaint:   Chief Complaint   Patient presents with    Right Hand - Post-op Evaluation     Right hand CTR 12.11.19. States that her hand is doing good, still sore.     Right Knee - Pain     Right TKA 6.5.19. States that her knee is doing ok, last night her knee felt tight.        Past Medical History:   Diagnosis Date    Bipolar 1 disorder 2011    Diabetes mellitus, type 2     pt states "no longer diabetic since I lost 100lbs"    Hyperlipidemia 2017    Knee injury 2011    Lumbar pain 1994    herniated disc     Parkinson's disease 2016    Thyroid disease 2014       Past Surgical History:   Procedure Laterality Date    ABLATION  1999    CARPAL TUNNEL RELEASE Right 12/11/2019    Procedure: RELEASE, CARPAL TUNNEL;  Surgeon: Alfredo Blanc MD;  Location: [facility];  Service: Orthopedics;  Laterality: Right;    CHOLECYSTECTOMY  08/27/2018    Dr. Petersen     knee and back surgery  1994    Back surgery 1994    KNEE SURGERY Right 06/2019    LAPAROSCOPIC GASTRIC BANDING  2014    Gastric sleeve    NECK SURGERY  2004    neck     REMOVAL OF BONE SPUR OF FOOT Right 2010    TOTAL KNEE ARTHROPLASTY Right 06/05/2019       Current Outpatient Medications   Medication Sig    atorvastatin (LIPITOR) 20 MG tablet Take 20 mg by mouth once daily.     biotin 10,000 mcg Cap Take 10,000 mEq by mouth once daily.     carbidopa-levodopa  mg (SINEMET)  mg per tablet Take 1 tablet by mouth 3 (three) times daily.     escitalopram oxalate (LEXAPRO) 20 MG tablet     fish oil-omega-3 fatty acids 300-1,000 mg capsule Take 1 capsule by mouth once daily.     hydrOXYzine HCl (ATARAX) 25 MG tablet Take 25 mg by mouth 2 (two) times daily as needed for Itching.    levothyroxine (SYNTHROID) 88 MCG tablet Take 88 mcg by mouth once daily.    multivitamin (THERAGRAN) per tablet Take 1 tablet by mouth once daily.     pramipexole (MIRAPEX) 0.125 MG tablet Take " 0.125 mg by mouth 3 (three) times daily.     solifenacin (VESICARE) 5 MG tablet TAKE 1 TABLET BY MOUTH EVERY DAY (Patient taking differently: Take 5 mg by mouth once daily. )    traMADol (ULTRAM) 50 mg tablet     traZODone (DESYREL) 150 MG tablet Take 150 mg by mouth nightly.      No current facility-administered medications for this visit.        Review of patient's allergies indicates:   Allergen Reactions    Pcn [penicillins] Hives    Morphine Hallucinations       Family History   Problem Relation Age of Onset    Cancer Father     Diabetes Father     Hyperlipidemia Father     Cancer Paternal Aunt     Cancer Maternal Grandfather     Hyperlipidemia Maternal Grandfather     Hyperlipidemia Paternal Grandmother     Hyperlipidemia Paternal Grandfather        Social History     Socioeconomic History    Marital status:      Spouse name: Not on file    Number of children: Not on file    Years of education: Not on file    Highest education level: Not on file   Occupational History    Not on file   Social Needs    Financial resource strain: Not on file    Food insecurity:     Worry: Not on file     Inability: Not on file    Transportation needs:     Medical: Not on file     Non-medical: Not on file   Tobacco Use    Smoking status: Former Smoker     Packs/day: 1.00     Years: 20.00     Pack years: 20.00     Types: Cigarettes     Last attempt to quit:      Years since quittin.0    Smokeless tobacco: Never Used   Substance and Sexual Activity    Alcohol use: No     Frequency: Never    Drug use: No    Sexual activity: Not Currently   Lifestyle    Physical activity:     Days per week: Not on file     Minutes per session: Not on file    Stress: Not on file   Relationships    Social connections:     Talks on phone: Not on file     Gets together: Not on file     Attends Quaker service: Not on file     Active member of club or organization: Not on file     Attends meetings of clubs or  organizations: Not on file     Relationship status: Not on file   Other Topics Concern    Not on file   Social History Narrative    Not on file       History of present illness:  Patient comes in today for the right hand in the right knee.  She is also here for the left knee.  In terms of her hand she is generally doing well.  She has mild pain in the palm.  Terms of her right knee she is doing well she has no pain whatsoever.  Her left knee aches and bothers her.  She has known arthritis of the left knee      Review of Systems:    Musculoskeletal:  See HPI      Objective:        Physical Examination:    Vital Signs:    Vitals:    01/28/20 0749   BP: 127/80   Pulse: 75       Body mass index is 34.97 kg/m².    This a well-developed, well nourished patient in no acute distress.  They are alert and oriented and cooperative to examination.        Patient has remained of motion of the right knee 0-115 degrees.  The knee is stable.  No effusion.  Well-healed incision.  The left knee has symmetric range of motion crepitus.  Varus deformity.  Trace effusion.    Full range of motion of the right hand.  Well-healed incisions.  Good opposition.  Sensation is intact.  Pertinent New Results:    XRAY Report / Interpretation:   AP lateral and sunrise views of the right knee demonstrates a right total knee to be in ideal position no evidence of loosening subluxations subsidence or change from prior films    Assessment/Plan:      Stable following right total knee.  Stable following carpal tunnel release.  Advanced arthritis left knee.  I injected the left knee with Depo-Medrol and lidocaine.  Follow up p.r.n.    This note was created using Dragon voice recognition software that occasionally misinterpreted phrases or words.

## 2020-04-14 RX ORDER — SOLIFENACIN SUCCINATE 5 MG/1
5 TABLET, FILM COATED ORAL DAILY
Qty: 30 TABLET | Refills: 6 | Status: SHIPPED | OUTPATIENT
Start: 2020-04-14

## 2020-04-14 NOTE — TELEPHONE ENCOUNTER
We could potentially try to do a virtual visit with her if she is interested.  I will authorize the vesicare, but if she is not able/wanting to do the virtual visit, she needs to be seen for yearly follow up in 3-4 months.

## 2020-07-20 DIAGNOSIS — M48.061 SPINAL STENOSIS, LUMBAR REGION, WITHOUT NEUROGENIC CLAUDICATION: ICD-10-CM

## 2020-07-20 DIAGNOSIS — M50.30 DEGENERATIVE DISC DISEASE, CERVICAL: Primary | ICD-10-CM

## 2020-07-20 DIAGNOSIS — M48.02 CERVICAL STENOSIS OF SPINE: ICD-10-CM

## 2020-07-28 ENCOUNTER — CLINICAL SUPPORT (OUTPATIENT)
Dept: REHABILITATION | Facility: HOSPITAL | Age: 50
End: 2020-07-28
Payer: MEDICARE

## 2020-07-28 DIAGNOSIS — M53.86 DECREASED RANGE OF MOTION OF LUMBAR SPINE: ICD-10-CM

## 2020-07-28 DIAGNOSIS — M54.50 CHRONIC BILATERAL LOW BACK PAIN WITHOUT SCIATICA: ICD-10-CM

## 2020-07-28 DIAGNOSIS — R29.898 WEAKNESS OF BOTH LOWER EXTREMITIES: ICD-10-CM

## 2020-07-28 DIAGNOSIS — G89.29 CHRONIC BILATERAL LOW BACK PAIN WITHOUT SCIATICA: ICD-10-CM

## 2020-07-28 DIAGNOSIS — R29.3 POOR POSTURE: ICD-10-CM

## 2020-07-28 PROCEDURE — 97161 PT EVAL LOW COMPLEX 20 MIN: CPT

## 2020-07-28 PROCEDURE — 97530 THERAPEUTIC ACTIVITIES: CPT

## 2020-07-28 NOTE — PLAN OF CARE
"[facility] OUTPATIENT THERAPY  Physical Therapy Initial Evaluation    Name: Sabrina Black  Clinic Number: [id number]    Therapy Diagnosis: No diagnosis found.  Physician: Jessica Burton MD    Physician Orders: PT Eval and Treat   Medical Diagnosis from Referral:   M48.061 (ICD-10-CM) - Spinal stenosis, lumbar region, without neurogenic claudication   M48.02 (ICD-10-CM) - Cervical stenosis of spine   M50.30 (ICD-10-CM) - Degenerative disc disease, cervical     Evaluation Date: 7/28/2020  Authorization Period Expiration: 12/31/2020  Plan of Care Expiration: 09/18/2020  Visit # / Visits authorized: 1/ 104 (0/10)  Total visit count: 1    Time In: 1645  Time Out: 1730  Total Billable Time: 45 minutes    Precautions: Standard and Diabetes    Subjective     Date of onset: 10 years     History of current condition - Sabrina reports: Pt reports she has had back pain on and off 10 years. She states she used to live in [place], [place] around 2 years ago and had an epidural which helped, but now she is having pain again. Pt reports in 1994 she had back surgery to repair a disc. Fall 1 month ago at her mothers house tripped on a rug. Pt has cervical fusion C3-4. Pt reports that her neck is doing good now her main complaint is her lower back.      Medical History:   Past Medical History:   Diagnosis Date    Bipolar 1 disorder 2011    Diabetes mellitus, type 2     pt states "no longer diabetic since I lost 100lbs"    Hyperlipidemia 2017    Knee injury 2011    Lumbar pain 1994    herniated disc     Parkinson's disease 2016    Thyroid disease 2014       Surgical History:   Sabrina Black  has a past surgical history that includes Cholecystectomy (08/27/2018); knee and back surgery (1994); Ablation (1999); Knee surgery (Right, 06/2019); Total knee arthroplasty (Right, 06/05/2019); Neck surgery (2004); Removal of bone spur of foot (Right, 2010); Laparoscopic gastric banding (2014); and Carpal tunnel " release (Right, 12/11/2019).    Medications:   Sabrina has a current medication list which includes the following prescription(s): atorvastatin, biotin, carbidopa-levodopa  mg, escitalopram oxalate, fish oil-omega-3 fatty acids, hydroxyzine hcl, levothyroxine, multivitamin, pramipexole, solifenacin, tramadol, and trazodone.    Allergies:   Review of patient's allergies indicates:   Allergen Reactions    Pcn [penicillins] Hives    Morphine Hallucinations        Imaging, MRI 1 year ago, but don't have results in chart. Pt states the MRI results were L3-4 disc herniation.     Prior Therapy: Yes, not for this condition   Social History:  lives alone  in a single story 1 step to enter   Occupation: N/A   Prior Level of Function: Pt reports she has had back pain for 10 years affecting her mobility.   Current Level of Function: Lifting/carrying objects, foot drop causes her to trip she has had that for a couple of years.     Pain:  Current 7/10, worst 9/10, best 4/10   Location: bilateral down into L buttocks, and sometimes legs hurt   Description: Dull and Sharp  Aggravating Factors: Sitting, Standing, Bending and Lifting. Sit for 15 minutes before have to move, standing for 5 minutes before have to sit down.   Easing Factors: lying down, heating pad   24 hrs: Worse in morning, gets better throughout the day, then worse in the morning.     Pts goals: Better movement, Sit for 1 hour before have to move around, Stand for 30 minutes.     Objective     Structural/Postural Inspection: Protracted shoulders, fwd head posture    Palpation for Condition: Tenderness to palpation of L1-L5 spinous processes and B erector spinae    Functional Squat:  Pain with functional 1/4 squat      Lumbar/Thoracic AROM (Degrees) Pain/Dysfunctional Movement  Comments   Flexion 25 Pain     Extension 10 Pain     Right Side Bend 10 Pain     Left Side Bend 10 Pain     Right Rotation 35 No Pain     Left Rotation 35 No Pain       Repeated  Movements:    Repeated Movements/Direction Specific Repetitions Centralization/Peripheralization    Supine Flexion  10  Increase pain    Prone Extension  10 Increase pain      Lumbar Special Test:     Lumbar Special Test  Results Comments   Lumbar Compression  Positive.    Lumbar Distraction  Positive       SI Tests/Screen:    Sacroiliac Results Comments   Forten's Sign  Negative.    TTP of Posterior Iliac ligaments  Negative.    SI Distraction * Negative.    SI Compression * Negative.    FABERs Negative.      Supine Bridge:  Pt with difficulty performing Supine Bridge     LE MMT Testing:     RLE Strength Grade LLE Strength Grade   Hip Flexion 4/5 Hip Flexion 4/5   Hip Extension 4-/5 Hip Extension 4-/5   Hip Abduction 4/5 Hip Abduction 4/5   Hip Adduction 4-/5 Hip Adduction 4-/5   Hip Internal Rotation 4+/5 Hip Internal Rotation 4+/5   Hip External Rotation 4+/5 Hip External Rotation 4+/5   Knee Flexion 4+/5 Knee Flexion 4+/5   Knee Extension 5/5 Knee Extension 5/5   Ankle Dorsiflexion 5/5 Ankle Dorsiflexion 5/5       Muscle Length Tension Testing:     Lumbar/Hip/Knee Right  Left  Comments   Tod Test negative negative    Hamstring Length (90/90) WNL WNL    Piriformis Length Test Limited Limited      Hip Screen:      Hip/Knee/Ankle (Degrees) AROM (Right) PROM (Right) AROM (Left) PROM (Left) Comments   Hip Flexion WFL  WFL     Hip Extension WFL  WFL     Hip Abduction WFL  WFL     Hip Adduction        Hip Internal Rotation WFL  WFL     Hip External Rotation Limited  Limited     Knee Flexion WFL  WNL     Knee Extension WFL  WFL      Ankle Dorsiflexion WFL  WFL     Ankle Plantarflexion WFL  WFL       Sensation: Light touch to BLE intact      Neuro Testing Right  Left Comments   Seated Slump Negative. Negative.    SLR (Sciatic) Negative. Negative.        Joint Accessory:  Hypomobility of thoracic and lumbar spinal segments     Outcome Measures:     Modified Oswestry Disability Index: 21/50 or 42% disability        TREATMENT     Sabrina participated in dynamic functional therapeutic activities to improve functional performance for 8   minutes, including:    PT educated pt on condition, prognosis, and plan of care     PT educated pt on and provided HEP consisting of:     Supine Bridges 2x10  Supine TA activation 2x10 with 10s hold   Supine Piriformis stretch 3x30s hold   Sidelying Hip ABD 2x10     HEP to be performed 2x daily      Home Exercises and Patient Education Provided    Education provided:   - Patient was issued HEP and educated on mode frequency reps, and sets as well as when to stop TE.  Patient verbalzied understanding, performed return demonstration and with no adverse affects noted nor verbalized.       - Patient was educated on condition, prognosis, and plan of care    Written Home Exercises Provided: yes.  Exercises were reviewed and Sabrina was able to demonstrate them prior to the end of the session.  Sabrina demonstrated good  understanding of the education provided.     See EMR under Patient Instructions for exercises provided 7/28/2020.      Assessment   Sabrina is a 50 y.o. female referred to outpatient Physical Therapy with a medical diagnosis of lumbar spinal stenosis without neurogenic claudication, cervical stenosis of spine, DDD of cervical spine. Pt presents with chronic bilateral lower back pain that radiates into hips at times. Pt's pain increases with prolonged sitting or standing and decreases with walking. Pt demonstrates decreased lumbar ROM, hypomobility of lumbar and thoracic spine, BLE weakness, and impaired posture. Pt's symptoms and deficits are limiting her ability perform daily activities and household chores without pain.     Pt prognosis is Fair.   Pt will benefit from skilled outpatient Physical Therapy to address the deficits stated above and in the chart below, provide pt/family education, and to maximize pt's level of independence.     Plan of care discussed with patient:  Yes  Pt's spiritual, cultural and educational needs considered and pt agreeable to plan of care and goals as stated below:     Anticipated Barriers for therapy: Chronicity of condition     Medical Necessity is demonstrated by the following  History  Co-morbidities and personal factors that may impact the plan of care Co-morbidities:   Bipoloar disorder, Diabetes, Hyperlipidemia, Parkinson's Disease       Personal Factors:   no deficits     moderate   Examination  Body Structures and Functions, activity limitations and participation restrictions that may impact the plan of care Body Regions:   back  lower extremities    Body Systems:    ROM  strength    Participation Restrictions:   Prolonged sitting or standing     Activity limitations:   Mobility  lifting and carrying objects    Self care  no deficits    Domestic Life  doing house work (cleaning house, washing dishes, laundry)    Community and Social Life  recreation and leisure         moderate   Clinical Presentation stable and uncomplicated low   Decision Making/ Complexity Score: low     Goals   GOALS: Short Term Goals:  3 weeks  1.Report decreased lower back pain  < / =  5 /10  to increase tolerance for ADLs  2. Increase lumbar ROM  By 5 degrees in order to improve functional mobility.    3. Increase strength by 1/3 MMT grade in B LE in order to improve tolerance for ADLs.  4. Pt to tolerate HEP to improve ROM and independence with ADL's  6. Pt to improve Modified Oswestry score to 15/50 to demonstrate decreased disability and improved function.       Long Term Goals: 6 weeks  1.Report decreased   Lower back pain  <   / =  3  /10  to increase tolerance for yard work  2. Increase lumbar ROM by 10 degrees in order to improve functional mobility.   3.Increase strength to >/= 4+/5 MMT grade for  BLE  to increase tolerance for ADL and work activities.  4. Pt to demonstrate negative Bridge Test in order to show improved core strength for lumbar stabilization.   5.  Patient's goal: Be able to stand for longer than 30 minutes before having to sit due to low back pain.   6. Pt to improve Modified Oswestry score to </= 11/50 to demonstrate decreased disability an dimproved function.     Plan   Certification Period: 7/28/2020 to 09/18/2020.    Outpatient Physical Therapy 2 times weekly for 4 weeks, then 1 time weekly for 4 weeks to include the following interventions: patient education, Cervical/Lumbar Traction, Manual Therapy, Moist Heat/ Ice, Neuromuscular Re-ed, Patient Education, Therapeutic Activites and Therapeutic Exercise.     Abdiel Younger, PT         I CERTIFY THE NEED FOR THESE SERVICES FURNISHED UNDER THIS PLAN OF TREATMENT AND WHILE UNDER MY CARE     Physician's comments:           Physician's Signature: ___________________________________________________

## 2020-07-29 ENCOUNTER — TELEPHONE (OUTPATIENT)
Dept: UROGYNECOLOGY | Facility: CLINIC | Age: 50
End: 2020-07-29

## 2020-07-29 PROBLEM — R29.898 LOWER EXTREMITY WEAKNESS: Status: ACTIVE | Noted: 2020-07-29

## 2020-07-29 PROBLEM — M54.50 CHRONIC LOWER BACK PAIN: Status: ACTIVE | Noted: 2020-07-29

## 2020-07-29 PROBLEM — G89.29 CHRONIC LOWER BACK PAIN: Status: ACTIVE | Noted: 2020-07-29

## 2020-07-29 PROBLEM — R29.3 POOR POSTURE: Status: ACTIVE | Noted: 2020-07-29

## 2020-07-29 PROBLEM — M53.86 DECREASED RANGE OF MOTION OF LUMBAR SPINE: Status: ACTIVE | Noted: 2020-07-29

## 2020-07-29 NOTE — TELEPHONE ENCOUNTER
Please let the pt know that we received a fax from her insurance company stating that the vesicare is no longer covered without trying either toviaz or tolterodine.  She hasn't been seen since 03/26/19, so if she is needing to change medications, I would suggest coming in for an appointment.

## 2020-08-04 ENCOUNTER — CLINICAL SUPPORT (OUTPATIENT)
Dept: REHABILITATION | Facility: HOSPITAL | Age: 50
End: 2020-08-04
Payer: MEDICARE

## 2020-08-04 DIAGNOSIS — R29.3 POOR POSTURE: ICD-10-CM

## 2020-08-04 DIAGNOSIS — M53.86 DECREASED RANGE OF MOTION OF LUMBAR SPINE: ICD-10-CM

## 2020-08-04 DIAGNOSIS — M54.50 CHRONIC BILATERAL LOW BACK PAIN WITHOUT SCIATICA: ICD-10-CM

## 2020-08-04 DIAGNOSIS — G89.29 CHRONIC BILATERAL LOW BACK PAIN WITHOUT SCIATICA: ICD-10-CM

## 2020-08-04 DIAGNOSIS — R29.898 WEAKNESS OF BOTH LOWER EXTREMITIES: ICD-10-CM

## 2020-08-04 PROCEDURE — 97110 THERAPEUTIC EXERCISES: CPT | Mod: CQ

## 2020-08-04 NOTE — PROGRESS NOTES
Physical Therapy Treatment Note     Name: Sabrina Black  Clinic Number: [id number]    Therapy Diagnosis:   Encounter Diagnoses   Name Primary?    Chronic bilateral low back pain without sciatica     Decreased range of motion of lumbar spine     Weakness of both lower extremities     Poor posture      Physician: Jessica Burton MD    Visit Date: 8/4/2020    Physician Orders: PT Eval and Treat   Medical Diagnosis from Referral:   M48.061 (ICD-10-CM) - Spinal stenosis, lumbar region, without neurogenic claudication   M48.02 (ICD-10-CM) - Cervical stenosis of spine   M50.30 (ICD-10-CM) - Degenerative disc disease, cervical      Evaluation Date: 7/28/2020  Authorization Period Expiration: 12/31/2020  Plan of Care Expiration: 09/18/2020  Visit # / Visits authorized: 1/ 104 (1/10)  Total visit count: 1    Time In: 745  Time Out: 830  Total Billable Time: 45 minutes    Precautions: Standard and Diabetes    Subjective     Pt reports: No significant c/o pain or soreness after the initial eval.     She was compliant with home exercise program.  Response to previous treatment: n/a  Functional change: n/a    Pain: 4/10  Location: bilateral back      Objective     Sabrina received therapeutic exercises to develop strength, endurance, ROM, flexibility, posture and core stabilization for 30 minutes including:    Nustep 5 minutes  Hamstring stretch 3 x 30 sec  Piriformis 3 x 30   DKTC with swissball 5 sec hold 2 x 10   LTR with swissball 5 sec hold 2 x 10   TrA act 5 sec hold 2 x 10   PPT 5 sec hold 2 x 10   Bridges 3 x 10   Sidelying hip abd 2 x 10 each       Sabrina received the following supervised modalities after being cleared for contradictions: TENS:  Sabrina received TENS electrical stimulation for pain to the lumbar region with [facility] for a total of 10 minutes. Sabrina tolerated treatment well without any adverse effects.     Sabrina received hot pack for 10 minutes to lumbar region with E-stim .      Home Exercises  Provided and Patient Education Provided     Education provided:   - HEP     Written Home Exercises Provided: Patient instructed to cont prior HEP.  Exercises were reviewed and Sabrina was able to demonstrate them prior to the end of the session.  Sabrina demonstrated good  understanding of the education provided.     See EMR under Patient Instructions for exercises provided prior visit.    Assessment     Pt was instructed in and performed therapeutic exercises for increased strength and flexibility of core and lower extremity musculature. She was able to perform all recommended TE without significant increase of painful symptoms. She will continue to benefit from skilled therapy to address deficits identified during initial eval.     Sabrina is progressing well towards her goals.   Pt prognosis is Good.     Pt will continue to benefit from skilled outpatient physical therapy to address the deficits listed in the problem list box on initial evaluation, provide pt/family education and to maximize pt's level of independence in the home and community environment.     Pt's spiritual, cultural and educational needs considered and pt agreeable to plan of care and goals.     Anticipated barriers to physical therapy: Chronicity of condition     Goals: Short Term Goals:  3 weeks  1.Report decreased lower back pain  < / =  5 /10  to increase tolerance for ADLs  2. Increase lumbar ROM  By 5 degrees in order to improve functional mobility.    3. Increase strength by 1/3 MMT grade in B LE in order to improve tolerance for ADLs.  4. Pt to tolerate HEP to improve ROM and independence with ADL's  6. Pt to improve Modified Oswestry score to 15/50 to demonstrate decreased disability and improved function.         Long Term Goals: 6 weeks  1.Report decreased   Lower back pain  <   / =  3  /10  to increase tolerance for yard work  2. Increase lumbar ROM by 10 degrees in order to improve functional mobility.   3.Increase strength to >/=  4+/5 MMT grade for  BLE  to increase tolerance for ADL and work activities.  4. Pt to demonstrate negative Bridge Test in order to show improved core strength for lumbar stabilization.   5. Patient's goal: Be able to stand for longer than 30 minutes before having to sit due to low back pain.   6. Pt to improve Modified Oswestry score to </= 11/50 to demonstrate decreased disability an dimproved function.     Plan     Continue current POC advancing as tolerated.     Dawn Fofana, PTA

## 2020-08-05 ENCOUNTER — OFFICE VISIT (OUTPATIENT)
Dept: UROGYNECOLOGY | Facility: CLINIC | Age: 50
End: 2020-08-05
Payer: MEDICARE

## 2020-08-05 VITALS
DIASTOLIC BLOOD PRESSURE: 79 MMHG | BODY MASS INDEX: 34.61 KG/M2 | HEART RATE: 79 BPM | WEIGHT: 228.38 LBS | SYSTOLIC BLOOD PRESSURE: 138 MMHG | HEIGHT: 68 IN

## 2020-08-05 DIAGNOSIS — N39.41 URGE INCONTINENCE OF URINE: ICD-10-CM

## 2020-08-05 DIAGNOSIS — R35.0 URINARY FREQUENCY: Primary | ICD-10-CM

## 2020-08-05 LAB
BILIRUB SERPL-MCNC: ABNORMAL MG/DL
BLOOD URINE, POC: 250
CLARITY, POC UA: CLEAR
COLOR, POC UA: YELLOW
GLUCOSE UR QL STRIP: ABNORMAL
KETONES UR QL STRIP: ABNORMAL
LEUKOCYTE ESTERASE URINE, POC: ABNORMAL
NITRITE, POC UA: ABNORMAL
PH, POC UA: 6
PROTEIN, POC: ABNORMAL
SPECIFIC GRAVITY, POC UA: 1.01
UROBILINOGEN, POC UA: 1

## 2020-08-05 PROCEDURE — 99213 PR OFFICE/OUTPT VISIT, EST, LEVL III, 20-29 MIN: ICD-10-PCS | Mod: S$PBB,,, | Performed by: NURSE PRACTITIONER

## 2020-08-05 PROCEDURE — 87086 URINE CULTURE/COLONY COUNT: CPT

## 2020-08-05 PROCEDURE — 99999 PR PBB SHADOW E&M-EST. PATIENT-LVL IV: CPT | Mod: PBBFAC,,, | Performed by: NURSE PRACTITIONER

## 2020-08-05 PROCEDURE — 99214 OFFICE O/P EST MOD 30 MIN: CPT | Mod: PBBFAC,PO | Performed by: NURSE PRACTITIONER

## 2020-08-05 PROCEDURE — 99213 OFFICE O/P EST LOW 20 MIN: CPT | Mod: S$PBB,,, | Performed by: NURSE PRACTITIONER

## 2020-08-05 PROCEDURE — 81002 URINALYSIS NONAUTO W/O SCOPE: CPT | Mod: PBBFAC,PO | Performed by: NURSE PRACTITIONER

## 2020-08-05 PROCEDURE — 99999 PR PBB SHADOW E&M-EST. PATIENT-LVL IV: ICD-10-PCS | Mod: PBBFAC,,, | Performed by: NURSE PRACTITIONER

## 2020-08-05 RX ORDER — ALBUTEROL SULFATE 90 UG/1
AEROSOL, METERED RESPIRATORY (INHALATION)
COMMUNITY
Start: 2020-06-22

## 2020-08-05 RX ORDER — VARENICLINE TARTRATE 1 MG/1
TABLET, FILM COATED ORAL
COMMUNITY
Start: 2020-01-10

## 2020-08-05 RX ORDER — PROPRANOLOL HYDROCHLORIDE 10 MG/1
TABLET ORAL
COMMUNITY
Start: 2020-07-25

## 2020-08-05 RX ORDER — BUSPIRONE HYDROCHLORIDE 10 MG/1
TABLET ORAL
COMMUNITY
Start: 2020-05-05

## 2020-08-05 RX ORDER — GABAPENTIN 300 MG/1
CAPSULE ORAL
COMMUNITY
Start: 2020-07-24 | End: 2021-09-08

## 2020-08-05 RX ORDER — METHOCARBAMOL 500 MG/1
TABLET, FILM COATED ORAL
COMMUNITY
Start: 2020-07-24

## 2020-08-05 NOTE — PROGRESS NOTES
Subjective:       Patient ID: Sabrina Black is a 50 y.o. female.    Chief Complaint: Med Change      Sabrina Black is a 50 y.o. female who presents today for yearly visit and discussion of medication.  She was last seen in the office on 03/26/19.  At that time she had been switched from myrbetriq to ditropan.  She was switched off the myrbetriq because it was not covered at that time.  She was taking the ditropan and while it helped with her urinary symptoms she was having problems with dry mouth.  She was then switched to vesicare.  This also did well for her urinary symptoms but again she is having problems with dry mouth.  Her insurance is no longer covering the vesicare, so she is wondering what other medication she could take.  While she is on the vesicare she has frequency x q 30 minutes- 1 hour as she admits she is drinking more tea because her mouth is dry.  She doesn't have any nocturia and she denies any incontinence.  She denies any feeling of PVF.  She denies any other acute complaints/concerns.      Review of Systems   Constitutional: Negative for activity change, fever and unexpected weight change.   HENT: Negative for hearing loss.    Eyes: Negative for visual disturbance.   Respiratory: Negative for shortness of breath and wheezing.    Cardiovascular: Negative for chest pain, palpitations and leg swelling.   Gastrointestinal: Negative for abdominal pain, constipation and diarrhea.   Genitourinary: Positive for frequency. Negative for dyspareunia, dysuria, urgency, vaginal bleeding and vaginal discharge.   Musculoskeletal: Negative for gait problem and neck pain.   Skin: Negative for rash and wound.   Allergic/Immunologic: Negative for immunocompromised state.   Neurological: Negative for tremors, speech difficulty and weakness.   Hematological: Does not bruise/bleed easily.   Psychiatric/Behavioral: Negative for agitation and confusion.       Objective:      Physical Exam  Constitutional:        General: She is not in acute distress.     Appearance: She is well-developed.   HENT:      Head: Normocephalic and atraumatic.   Neck:      Musculoskeletal: Neck supple.      Thyroid: No thyromegaly.   Pulmonary:      Effort: Pulmonary effort is normal. No respiratory distress.   Abdominal:      Palpations: Abdomen is soft.      Tenderness: There is no abdominal tenderness.      Hernia: No hernia is present.   Musculoskeletal: Normal range of motion.   Skin:     General: Skin is warm and dry.      Findings: No rash.   Neurological:      Mental Status: She is alert and oriented to person, place, and time.   Psychiatric:         Behavior: Behavior normal.       Pelvic Exam:  Deferred.        Assessment:       1. Urinary frequency    2. Urge incontinence of urine        Plan:       Urinary frequency- we will see about getting myrbetriq covered as she is having side effects with the anticholinergics.    -     POCT urine dipstick without microscope  -     mirabegron (MYRBETRIQ) 50 mg Tb24; Take 1 tablet (50 mg total) by mouth once daily.  Dispense: 30 tablet; Refill: 6    Urge incontinence of urine - as noted above  -     mirabegron (MYRBETRIQ) 50 mg Tb24; Take 1 tablet (50 mg total) by mouth once daily.  Dispense: 30 tablet; Refill: 6    RTC 3 months

## 2020-08-06 NOTE — PROGRESS NOTES
"  Physical Therapy Treatment Note     Name: Sabrina Black  Clinic Number: [id number]    Therapy Diagnosis:   Encounter Diagnoses   Name Primary?    Chronic bilateral low back pain without sciatica Yes    Decreased range of motion of lumbar spine     Weakness of both lower extremities     Poor posture      Physician: Jessica Burton MD    Visit Date: 8/7/2020    Physician Orders: PT Eval and Treat   Medical Diagnosis from Referral:   M48.061 (ICD-10-CM) - Spinal stenosis, lumbar region, without neurogenic claudication   M48.02 (ICD-10-CM) - Cervical stenosis of spine   M50.30 (ICD-10-CM) - Degenerative disc disease, cervical      Evaluation Date: 7/28/2020  Authorization Period Expiration: 12/31/2020  Plan of Care Expiration: 09/18/2020  Visit # / Visits authorized: 2/10  (2/10)  Total visit count: 3    Time In: 4:15 pm  Time Out: 5:02 pm  Total Billable Time: 45 minutes    Precautions: Standard and Diabetes    Subjective     Pt reports: "I'd say that I am a 5/10. I was in the pool earlier today."     She was compliant with home exercise program.  Response to previous treatment: No adverse reactions reported.  Functional change: n/a    Pain: 5/10  Location: bilateral back      Objective     Sabrina received therapeutic exercises to develop strength, endurance, ROM, flexibility, posture and core stabilization for 45 minutes including:    Nustep 5 minutes on Level 5  -+Cybex HSC with three plates 10 x 3  -+Cybex scap pull with 3 plates 10 x 3  -+BUE Y's with OTB x 20  -+seated horizontal abd with OTB x 20  Hamstring stretch 3 x 30 sec to BLEs  Piriformis 3 x 30 seated on mat to LLE  DKTC with swissball 5 sec hold 2 x 10   LTR with swissball 5 sec hold 2 x 10   Bridges 3 x 10   PPT 5 sec hold 2 x 10       Exercises below not performed this day due to being out of time:  TrA act 5 sec hold 2 x 10   Sidelying hip abd 2 x 10 each       Sabrina received the following supervised modalities after being cleared for " contradictions: TENS:  Sabrina received TENS electrical stimulation for pain to the lumbar region with MHP for a total of 0 minutes. Sabrina tolerated treatment well without any adverse effects.     Sabrina received hot pack for 0 minutes to lumbar region with E-stim .      Home Exercises Provided and Patient Education Provided     Education provided:   - HEP     Written Home Exercises Provided: Patient instructed to cont prior HEP.  Exercises were reviewed and Sabrina was able to demonstrate them prior to the end of the session.  Sabrina demonstrated good  understanding of the education provided.     See EMR under Patient Instructions for exercises provided prior visit.    Assessment     Today, the patient reports a 5/10 pain. She enters with a normal upright posture. She has freedom of movement and does not appear in distress. Exercises were adjusted this day to include upper lumbar and lower thoracic strengthening to assist with spinal support. The patient did not report an increase in pain after her exercises this day. No modality needed this day. PT will assess need each PT session based on complaints. The patient was compliant with all of PTs instructions. She will continue to benefit from skilled therapy to address deficits identified during initial eval.     Sabrina is progressing well towards her goals.   Pt prognosis is Good.     Pt will continue to benefit from skilled outpatient physical therapy to address the deficits listed in the problem list box on initial evaluation, provide pt/family education and to maximize pt's level of independence in the home and community environment.     Pt's spiritual, cultural and educational needs considered and pt agreeable to plan of care and goals.     Anticipated barriers to physical therapy: Chronicity of condition     Goals: Short Term Goals:  3 weeks  1.Report decreased lower back pain  < / =  5 /10  to increase tolerance for ADLs  2. Increase lumbar ROM  By 5 degrees in  order to improve functional mobility.    3. Increase strength by 1/3 MMT grade in B LE in order to improve tolerance for ADLs.  4. Pt to tolerate HEP to improve ROM and independence with ADL's  6. Pt to improve Modified Oswestry score to 15/50 to demonstrate decreased disability and improved function.         Long Term Goals: 6 weeks  1.Report decreased   Lower back pain  <   / =  3  /10  to increase tolerance for yard work  2. Increase lumbar ROM by 10 degrees in order to improve functional mobility.   3.Increase strength to >/= 4+/5 MMT grade for  BLE  to increase tolerance for ADL and work activities.  4. Pt to demonstrate negative Bridge Test in order to show improved core strength for lumbar stabilization.   5. Patient's goal: Be able to stand for longer than 30 minutes before having to sit due to low back pain.   6. Pt to improve Modified Oswestry score to </= 11/50 to demonstrate decreased disability an dimproved function.     Plan     Plan to improve spinal stability so that pain will decrease and patient can avoid surgery.     Dillan Silvestre, PT

## 2020-08-07 ENCOUNTER — CLINICAL SUPPORT (OUTPATIENT)
Dept: REHABILITATION | Facility: HOSPITAL | Age: 50
End: 2020-08-07
Payer: MEDICARE

## 2020-08-07 DIAGNOSIS — M54.50 CHRONIC BILATERAL LOW BACK PAIN WITHOUT SCIATICA: Primary | ICD-10-CM

## 2020-08-07 DIAGNOSIS — G89.29 CHRONIC BILATERAL LOW BACK PAIN WITHOUT SCIATICA: Primary | ICD-10-CM

## 2020-08-07 DIAGNOSIS — M53.86 DECREASED RANGE OF MOTION OF LUMBAR SPINE: ICD-10-CM

## 2020-08-07 DIAGNOSIS — R29.3 POOR POSTURE: ICD-10-CM

## 2020-08-07 DIAGNOSIS — R29.898 WEAKNESS OF BOTH LOWER EXTREMITIES: ICD-10-CM

## 2020-08-07 LAB — BACTERIA UR CULT: NO GROWTH

## 2020-08-07 PROCEDURE — 97110 THERAPEUTIC EXERCISES: CPT

## 2020-08-11 ENCOUNTER — DOCUMENTATION ONLY (OUTPATIENT)
Dept: REHABILITATION | Facility: HOSPITAL | Age: 50
End: 2020-08-11

## 2020-08-11 ENCOUNTER — CLINICAL SUPPORT (OUTPATIENT)
Dept: REHABILITATION | Facility: HOSPITAL | Age: 50
End: 2020-08-11
Payer: MEDICARE

## 2020-08-11 DIAGNOSIS — M53.86 DECREASED RANGE OF MOTION OF LUMBAR SPINE: ICD-10-CM

## 2020-08-11 DIAGNOSIS — M54.50 CHRONIC BILATERAL LOW BACK PAIN WITHOUT SCIATICA: ICD-10-CM

## 2020-08-11 DIAGNOSIS — R29.898 WEAKNESS OF BOTH LOWER EXTREMITIES: ICD-10-CM

## 2020-08-11 DIAGNOSIS — G89.29 CHRONIC BILATERAL LOW BACK PAIN WITHOUT SCIATICA: ICD-10-CM

## 2020-08-11 DIAGNOSIS — R29.3 POOR POSTURE: ICD-10-CM

## 2020-08-11 PROCEDURE — 97014 ELECTRIC STIMULATION THERAPY: CPT | Mod: CQ

## 2020-08-11 PROCEDURE — 97110 THERAPEUTIC EXERCISES: CPT | Mod: CQ

## 2020-08-11 NOTE — PROGRESS NOTES
"  Physical Therapy Treatment Note     Name: Sabrina Black  Clinic Number: [id number]    Therapy Diagnosis:   Encounter Diagnoses   Name Primary?    Chronic bilateral low back pain without sciatica     Decreased range of motion of lumbar spine     Weakness of both lower extremities     Poor posture      Physician: Jessica Burton MD    Visit Date: 8/11/2020    Physician Orders: PT Eval and Treat   Medical Diagnosis from Referral:   M48.061 (ICD-10-CM) - Spinal stenosis, lumbar region, without neurogenic claudication   M48.02 (ICD-10-CM) - Cervical stenosis of spine   M50.30 (ICD-10-CM) - Degenerative disc disease, cervical      Evaluation Date: 7/28/2020  Authorization Period Expiration: 12/31/2020  Plan of Care Expiration: 09/18/2020  Visit # / Visits authorized: 2/10  (2/10)  Total visit count: 3    Time In: 4:15 pm  Time Out: 5:15 pm  Total Billable Time: 45 minutes    Precautions: Standard and Diabetes    Subjective     Pt reports: "I'm sore today."     She was compliant with home exercise program.  Response to previous treatment: No adverse reactions reported.  Functional change: n/a    Pain: 6/10  Location: bilateral back      Objective     Sabrina received therapeutic exercises to develop strength, endurance, ROM, flexibility, posture and core stabilization for 45 minutes including:    Nustep 5 minutes on Level 5  -+Cybex HSC with three plates 10 x 3  -+Cybex scap pull with 3 plates 10 x 3  -+BUE Y's with OTB x 20  -+seated horizontal abd with OTB x 20  Hamstring stretch 3 x 30 sec to BLEs  Piriformis 3 x 30 seated on mat to LLE  DKTC with swissball 5 sec hold 2 x 10   LTR with swissball 5 sec hold 2 x 10   Bridges 3 x 10   PPT 5 sec hold 2 x 10       Exercises below not performed this day due to being out of time:  TrA act 5 sec hold 2 x 10   Sidelying hip abd 2 x 10 each       Sabrina received the following supervised modalities after being cleared for contradictions: TENS:  Sabrina received TENS " electrical stimulation for pain to the lumbar region with MHP for a total of 10 minutes. Sabrina tolerated treatment well without any adverse effects.     Sabrina received hot pack for 10 minutes to lumbar region with E-stim .      Home Exercises Provided and Patient Education Provided     Education provided:   - HEP     Written Home Exercises Provided: Patient instructed to cont prior HEP.  Exercises were reviewed and Sabrina was able to demonstrate them prior to the end of the session.  Sabrina demonstrated good  understanding of the education provided.     See EMR under Patient Instructions for exercises provided prior visit.    Assessment     Today, the patient reports a 6/10 pain. She enters with a normal upright posture but ambulated with decreased pelvic motion. Pt was able to tolerate all recommended TE but did c/o consistent back pain so E-stim with MHP applied following TE. The patient was compliant with all of PTs instructions. She will continue to benefit from skilled therapy to address deficits identified during initial eval.     Sabrina is progressing well towards her goals.   Pt prognosis is Good.     Pt will continue to benefit from skilled outpatient physical therapy to address the deficits listed in the problem list box on initial evaluation, provide pt/family education and to maximize pt's level of independence in the home and community environment.     Pt's spiritual, cultural and educational needs considered and pt agreeable to plan of care and goals.     Anticipated barriers to physical therapy: Chronicity of condition     Goals: Short Term Goals:  3 weeks  1.Report decreased lower back pain  < / =  5 /10  to increase tolerance for ADLs  2. Increase lumbar ROM  By 5 degrees in order to improve functional mobility.    3. Increase strength by 1/3 MMT grade in B LE in order to improve tolerance for ADLs.  4. Pt to tolerate HEP to improve ROM and independence with ADL's  6. Pt to improve Modified  Oswestry score to 15/50 to demonstrate decreased disability and improved function.         Long Term Goals: 6 weeks  1.Report decreased   Lower back pain  <   / =  3  /10  to increase tolerance for yard work  2. Increase lumbar ROM by 10 degrees in order to improve functional mobility.   3.Increase strength to >/= 4+/5 MMT grade for  BLE  to increase tolerance for ADL and work activities.  4. Pt to demonstrate negative Bridge Test in order to show improved core strength for lumbar stabilization.   5. Patient's goal: Be able to stand for longer than 30 minutes before having to sit due to low back pain.   6. Pt to improve Modified Oswestry score to </= 11/50 to demonstrate decreased disability an dimproved function.     Plan     Plan to improve spinal stability so that pain will decrease and patient can avoid surgery.     Dawn Fofana, PTA

## 2020-08-14 ENCOUNTER — TELEPHONE (OUTPATIENT)
Dept: UROGYNECOLOGY | Facility: CLINIC | Age: 50
End: 2020-08-14

## 2020-08-14 ENCOUNTER — CLINICAL SUPPORT (OUTPATIENT)
Dept: REHABILITATION | Facility: HOSPITAL | Age: 50
End: 2020-08-14
Payer: MEDICARE

## 2020-08-14 DIAGNOSIS — R29.3 POOR POSTURE: ICD-10-CM

## 2020-08-14 DIAGNOSIS — G89.29 CHRONIC BILATERAL LOW BACK PAIN WITHOUT SCIATICA: ICD-10-CM

## 2020-08-14 DIAGNOSIS — M54.50 CHRONIC BILATERAL LOW BACK PAIN WITHOUT SCIATICA: ICD-10-CM

## 2020-08-14 DIAGNOSIS — M53.86 DECREASED RANGE OF MOTION OF LUMBAR SPINE: ICD-10-CM

## 2020-08-14 DIAGNOSIS — R29.898 WEAKNESS OF BOTH LOWER EXTREMITIES: ICD-10-CM

## 2020-08-14 PROCEDURE — 97110 THERAPEUTIC EXERCISES: CPT | Mod: CQ

## 2020-08-14 PROCEDURE — 97014 ELECTRIC STIMULATION THERAPY: CPT | Mod: CQ

## 2020-08-14 PROCEDURE — 97530 THERAPEUTIC ACTIVITIES: CPT | Mod: CQ

## 2020-08-14 NOTE — TELEPHONE ENCOUNTER
Drug change request: Vesicare 5 mg tablet is not covered by the patients plan. The preferred alternative is oxybutynin, Trospium, . Did read in notes that both the oxybutynin taken 12/4/18-5/7/19 and the vesicare started 5/15/19 till present both caused dry mouth.   Checking to see if wanting to change to something else?

## 2020-08-14 NOTE — PROGRESS NOTES
Physical Therapy Treatment Note     Name: Sabrina Black  Clinic Number: [id number]    Therapy Diagnosis:   Encounter Diagnoses   Name Primary?    Chronic bilateral low back pain without sciatica     Decreased range of motion of lumbar spine     Weakness of both lower extremities     Poor posture      Physician: Jessica Burton MD    Visit Date: 8/14/2020    Physician Orders: PT Eval and Treat   Medical Diagnosis from Referral:   M48.061 (ICD-10-CM) - Spinal stenosis, lumbar region, without neurogenic claudication   M48.02 (ICD-10-CM) - Cervical stenosis of spine   M50.30 (ICD-10-CM) - Degenerative disc disease, cervical      Evaluation Date: 7/28/2020  Authorization Period Expiration: 12/31/2020  Plan of Care Expiration: 09/18/2020  Visit # / Visits authorized: 4/10    Total visit count: 4    Time In: 4:15 pm  Time Out: 5:15 pm  Total Billable Time: 45 minutes    Precautions: Standard and Diabetes    Subjective     Pt reports: Having a spasm in her back (R lumbar region) today that has been bothering her.     She was compliant with home exercise program.  Response to previous treatment: No adverse reactions reported.  Functional change: n/a    Pain: 6/10  Location: bilateral back      Objective     Sabrina received therapeutic exercises to develop strength, endurance, ROM, flexibility, posture and core stabilization for 35 minutes including:    Nustep 5 minutes on Level 5  -+Cybex HSC with three plates 10 x 3  -+BUE Y's with OTB x 20  -+seated horizontal abd with OTB x 20  Hamstring stretch 3 x 30 sec to BLEs  Piriformis 3 x 30 seated on mat to LLE  DKTC with swissball 5 sec hold 2 x 10   LTR with swissball 5 sec hold 2 x 10   Bridges 3 x 10       Sabrina participated in dynamic functional therapeutic activities to improve functional performance for 10  minutes, including: postural correction exercises for improved body mechanics    PPT 5 sec hold 2 x 10   -+Cybex scap pull with 3 plates 10 x 3  -+seated  horizontal abd with OTB x 20    Exercises below not performed this day due to being out of time:  TrA act 5 sec hold 2 x 10   Sidelying hip abd 2 x 10 each       Sabrina received the following supervised modalities after being cleared for contradictions: TENS:  Sabrina received TENS electrical stimulation for pain to the lumbar region with MHP for a total of 10 minutes. Sabrina tolerated treatment well without any adverse effects.     Sabrina received hot pack for 10 minutes to lumbar region with E-stim .      Home Exercises Provided and Patient Education Provided     Education provided:   - HEP     Written Home Exercises Provided: Patient instructed to cont prior HEP.  Exercises were reviewed and Sabrina was able to demonstrate them prior to the end of the session.  Sabrina demonstrated good  understanding of the education provided.     See EMR under Patient Instructions for exercises provided prior visit.    Assessment     Today, the patient was able to complete all recommended TE without significant increase of painful symptoms. She required min cues in order to properly perform PPT. E-stim and MHP applied to lumbar region following TE with good results as she reported decreased pain following modalities. The patient was compliant with all of PTs instructions. She will continue to benefit from skilled therapy to address deficits identified during initial eval.     Sabrina is progressing well towards her goals.   Pt prognosis is Good.     Pt will continue to benefit from skilled outpatient physical therapy to address the deficits listed in the problem list box on initial evaluation, provide pt/family education and to maximize pt's level of independence in the home and community environment.     Pt's spiritual, cultural and educational needs considered and pt agreeable to plan of care and goals.     Anticipated barriers to physical therapy: Chronicity of condition     Goals: Short Term Goals:  3 weeks  1.Report decreased  lower back pain  < / =  5 /10  to increase tolerance for ADLs  2. Increase lumbar ROM  By 5 degrees in order to improve functional mobility.    3. Increase strength by 1/3 MMT grade in B LE in order to improve tolerance for ADLs.  4. Pt to tolerate HEP to improve ROM and independence with ADL's  6. Pt to improve Modified Oswestry score to 15/50 to demonstrate decreased disability and improved function.         Long Term Goals: 6 weeks  1.Report decreased   Lower back pain  <   / =  3  /10  to increase tolerance for yard work  2. Increase lumbar ROM by 10 degrees in order to improve functional mobility.   3.Increase strength to >/= 4+/5 MMT grade for  BLE  to increase tolerance for ADL and work activities.  4. Pt to demonstrate negative Bridge Test in order to show improved core strength for lumbar stabilization.   5. Patient's goal: Be able to stand for longer than 30 minutes before having to sit due to low back pain.   6. Pt to improve Modified Oswestry score to </= 11/50 to demonstrate decreased disability an dimproved function.     Plan     Plan to improve spinal stability so that pain will decrease and patient can avoid surgery.     Dawn Fofana, PTA

## 2020-08-17 NOTE — TELEPHONE ENCOUNTER
----- Message from Aleshia Anand sent at 7/23/2019  3:34 PM CDT -----  Contact: Patient  Patient needs refill on oxycodone last filled 7/12. Please call [phone]   same

## 2020-08-18 ENCOUNTER — CLINICAL SUPPORT (OUTPATIENT)
Dept: REHABILITATION | Facility: HOSPITAL | Age: 50
End: 2020-08-18
Payer: MEDICARE

## 2020-08-18 DIAGNOSIS — G89.29 CHRONIC BILATERAL LOW BACK PAIN WITHOUT SCIATICA: ICD-10-CM

## 2020-08-18 DIAGNOSIS — R29.3 POOR POSTURE: ICD-10-CM

## 2020-08-18 DIAGNOSIS — R29.898 WEAKNESS OF BOTH LOWER EXTREMITIES: ICD-10-CM

## 2020-08-18 DIAGNOSIS — M54.50 CHRONIC BILATERAL LOW BACK PAIN WITHOUT SCIATICA: ICD-10-CM

## 2020-08-18 DIAGNOSIS — M53.86 DECREASED RANGE OF MOTION OF LUMBAR SPINE: ICD-10-CM

## 2020-08-18 PROCEDURE — 97110 THERAPEUTIC EXERCISES: CPT | Mod: CQ

## 2020-08-18 PROCEDURE — 97530 THERAPEUTIC ACTIVITIES: CPT | Mod: CQ

## 2020-08-18 PROCEDURE — 97014 ELECTRIC STIMULATION THERAPY: CPT | Mod: CQ

## 2020-08-18 NOTE — TELEPHONE ENCOUNTER
I think we were trying to get a tier exception on myrbetriq because while it was approved, I believe it was around $200.

## 2020-08-18 NOTE — PROGRESS NOTES
Physical Therapy Treatment Note     Name: Sabrina Black  Clinic Number: [id number]    Therapy Diagnosis:   Encounter Diagnoses   Name Primary?    Chronic bilateral low back pain without sciatica     Decreased range of motion of lumbar spine     Weakness of both lower extremities     Poor posture      Physician: Jessica Burton MD    Visit Date: 8/18/2020    Physician Orders: PT Eval and Treat   Medical Diagnosis from Referral:   M48.061 (ICD-10-CM) - Spinal stenosis, lumbar region, without neurogenic claudication   M48.02 (ICD-10-CM) - Cervical stenosis of spine   M50.30 (ICD-10-CM) - Degenerative disc disease, cervical      Evaluation Date: 7/28/2020  Authorization Period Expiration: 12/31/2020  Plan of Care Expiration: 09/18/2020  Visit # / Visits authorized: 4/10    Total visit count: 4    Time In: 7:45  Time Out: 8:45  Total Billable Time: 60 minutes    Precautions: Standard and Diabetes    Subjective     Pt reports: Having a spasm in her back (R lumbar region) today that has been bothering her.     She was compliant with home exercise program.  Response to previous treatment: No adverse reactions reported.  Functional change: n/a    Pain: 6/10  Location: bilateral back      Objective     Sabrina received therapeutic exercises to develop strength, endurance, ROM, flexibility, posture and core stabilization for 35 minutes including:    Nustep 5 minutes on Level 5  -+Cybex HSC with three plates 10 x 3  -+Cybex Leg press 5 plates 10 x 3 with cues to engage TrA   -+BUE Y's with OTB x 20  -+seated horizontal abd with OTB x 20  Hamstring stretch 3 x 30 sec to BLEs  Piriformis 3 x 30 seated on mat to LLE  DKTC with swissball 5 sec hold 2 x 10   LTR with swissball 5 sec hold 2 x 10   Bridges 3 x 10       Sabrina participated in dynamic functional therapeutic activities to improve functional performance for 10  minutes, including: postural correction exercises for improved body mechanics    PPT 5 sec hold 2 x  10   -+Cybex rows 3 plates 3 x 10   -+Cybex scap pull with 3 plates 10 x 3  -+seated horizontal abd with OTB x 20  TrA act 5 sec hold 2 x 10   Sidelying hip abd 2 x 10 each       Sabrina received the following supervised modalities after being cleared for contradictions: TENS:  Sabrina received TENS electrical stimulation for pain to the lumbar region with MHP for a total of 10 minutes. Sabrina tolerated treatment well without any adverse effects.     Sabrina received hot pack for 10 minutes to lumbar region with E-stim .      Home Exercises Provided and Patient Education Provided     Education provided:   - HEP     Written Home Exercises Provided: Patient instructed to cont prior HEP.  Exercises were reviewed and Sabrina was able to demonstrate them prior to the end of the session.  Sabrina demonstrated good  understanding of the education provided.     See EMR under Patient Instructions for exercises provided prior visit.    Assessment     Today, the patient was able to complete all recommended TE without significant increase of painful symptoms. She tolerated the addition of leg press and cybex rows without incident.  The patient was compliant with all of PTs instructions. She will continue to benefit from skilled therapy to address deficits identified during initial eval.     Sabrina is progressing well towards her goals.   Pt prognosis is Good.     Pt will continue to benefit from skilled outpatient physical therapy to address the deficits listed in the problem list box on initial evaluation, provide pt/family education and to maximize pt's level of independence in the home and community environment.     Pt's spiritual, cultural and educational needs considered and pt agreeable to plan of care and goals.     Anticipated barriers to physical therapy: Chronicity of condition     Goals: Short Term Goals:  3 weeks  1.Report decreased lower back pain  < / =  5 /10  to increase tolerance for ADLs  2. Increase lumbar ROM  By  5 degrees in order to improve functional mobility.    3. Increase strength by 1/3 MMT grade in B LE in order to improve tolerance for ADLs.  4. Pt to tolerate HEP to improve ROM and independence with ADL's  6. Pt to improve Modified Oswestry score to 15/50 to demonstrate decreased disability and improved function.         Long Term Goals: 6 weeks  1.Report decreased   Lower back pain  <   / =  3  /10  to increase tolerance for yard work  2. Increase lumbar ROM by 10 degrees in order to improve functional mobility.   3.Increase strength to >/= 4+/5 MMT grade for  BLE  to increase tolerance for ADL and work activities.  4. Pt to demonstrate negative Bridge Test in order to show improved core strength for lumbar stabilization.   5. Patient's goal: Be able to stand for longer than 30 minutes before having to sit due to low back pain.   6. Pt to improve Modified Oswestry score to </= 11/50 to demonstrate decreased disability an dimproved function.     Plan     Plan to improve spinal stability so that pain will decrease and patient can avoid surgery.     Dawn Fofana, PTA

## 2020-08-21 ENCOUNTER — CLINICAL SUPPORT (OUTPATIENT)
Dept: REHABILITATION | Facility: HOSPITAL | Age: 50
End: 2020-08-21
Payer: MEDICARE

## 2020-08-21 DIAGNOSIS — M53.86 DECREASED RANGE OF MOTION OF LUMBAR SPINE: ICD-10-CM

## 2020-08-21 DIAGNOSIS — R29.3 POOR POSTURE: ICD-10-CM

## 2020-08-21 DIAGNOSIS — M54.50 CHRONIC BILATERAL LOW BACK PAIN WITHOUT SCIATICA: ICD-10-CM

## 2020-08-21 DIAGNOSIS — R29.898 WEAKNESS OF BOTH LOWER EXTREMITIES: ICD-10-CM

## 2020-08-21 DIAGNOSIS — G89.29 CHRONIC BILATERAL LOW BACK PAIN WITHOUT SCIATICA: ICD-10-CM

## 2020-08-21 PROCEDURE — 97750 PHYSICAL PERFORMANCE TEST: CPT

## 2020-08-21 PROCEDURE — 97110 THERAPEUTIC EXERCISES: CPT

## 2020-08-21 PROCEDURE — 97530 THERAPEUTIC ACTIVITIES: CPT

## 2020-08-21 NOTE — PROGRESS NOTES
Physical Therapy Treatment Note/Re-assessment     Name: Sabrina Black  Clinic Number: [id number]    Therapy Diagnosis:   No diagnosis found.  Physician: Jessica Burton MD    Visit Date: 8/21/2020    Physician Orders: PT Eval and Treat   Medical Diagnosis from Referral:   M48.061 (ICD-10-CM) - Spinal stenosis, lumbar region, without neurogenic claudication   M48.02 (ICD-10-CM) - Cervical stenosis of spine   M50.30 (ICD-10-CM) - Degenerative disc disease, cervical      Evaluation Date: 7/28/2020  Authorization Period Expiration: 12/31/2020  Plan of Care Expiration: 09/18/2020  Visit # / Visits authorized: 6/10    Total visit count: 7    Time In: 1045  Time Out: 1130  Total Billable Time:45 minutes    Precautions: Standard and Diabetes     Reassessment due on or before 09/21/2020    Subjective     Pt reports: having muscle soreness, but not pain like before last visit. She states she is feeling a little better.   She was compliant with home exercise program. Pt states she feels she has improved 30% since initial evaluation.   Response to previous treatment: No adverse reactions reported.  Functional change: n/a    Pain: 4/10  Location: bilateral back      Objective     RLE Strength Grade LLE Strength Grade   Hip Flexion 4+/5 Hip Flexion 4+/5   Hip Extension 4-/5 Hip Extension 4-/5   Hip Abduction 4+/5 Hip Abduction 4/5   Hip Adduction 4/5 Hip Adduction 4/5   Hip Internal Rotation 4+/5 Hip Internal Rotation 4+/5   Hip External Rotation 4+/5 Hip External Rotation 4+/5   Knee Flexion 5/5 Knee Flexion 5/5   Knee Extension 5/5 Knee Extension 5/5   Ankle Dorsiflexion 5/5 Ankle Dorsiflexion 5/5       AROM (Degrees) Pain/Dysfunctional Movement  Comments   Flexion 50 Pain      Extension 15 Pain      Right Side Bend 10 Pain      Left Side Bend 10 Pain      Right Rotation 35 No Pain      Left Rotation 35 No Pain        IZZY: 22/50 or 44% disability     Sabrina received therapeutic exercises to develop strength,  endurance, ROM, flexibility, posture and core stabilization for 15 minutes including:    Nustep 5 minutes on Level 5  Hamstring stretch 3 x 30 sec to BLEs  Piriformis 3 x 30 seated on mat to LLE  Cybex HSC with three plates 10 x 3  Cybex Leg press 5 plates 10 x 3 with cues to engage TrA   BUE Y's with OTB x 20  seated shoulder horizontal abd with OTB 3x10    Following not performed:     DKTC with swissball 5 sec hold 2 x 10   LTR with swissball 5 sec hold 2 x 10   Bridges 3 x 10       Sabrina participated in dynamic functional therapeutic activities to improve functional performance for 10  minutes, including: postural correction exercises for improved body mechanics    PPT 5 sec hold 2 x 10   Cybex rows 3 plates 3 x 10   Cybex scap pull with 3 plates 10 x 3 (NP)  Seated horizontal abd with OTB x 20  TrA act 5 sec hold 2 x 10   Sidelying hip abd 2 x 10 each (NP)      Sabrina received the following supervised modalities after being cleared for contradictions: TENS:  Sabrina received TENS electrical stimulation for pain to the lumbar region with MHP for a total of 0 minutes. Sabrina tolerated treatment well without any adverse effects.     Sabrina received hot pack for 0  minutes to lumbar region with E-stim .      Home Exercises Provided and Patient Education Provided     Education provided:   - Continue HEP and focus on what improvements in function she is experiencing.     Written Home Exercises Provided: Patient instructed to cont prior HEP.  Exercises were reviewed and Sabrina was able to demonstrate them prior to the end of the session.  Sabrina demonstrated good  understanding of the education provided.     See EMR under Patient Instructions for exercises provided prior visit.    Re-Assessment     Sabrina tolerated re-assessment well meeting 2/6 STGs with progress towards remaining goals. Patient demonstrates subjective report of 30% improvement of condition since initial evaluation despite 1 point increase in IZZY. Pt  also with improvements in lumbar flexion/extension ROM and LE strength. Pt is progressing as expected and has a Fair prognosis to progress towards and meet remaining goals.       Sabrina is progressing well towards her goals.   Pt prognosis is Good.     Pt will continue to benefit from skilled outpatient physical therapy to address the deficits listed in the problem list box on initial evaluation, provide pt/family education and to maximize pt's level of independence in the home and community environment.     Pt's spiritual, cultural and educational needs considered and pt agreeable to plan of care and goals.     Anticipated barriers to physical therapy: Chronicity of condition     Goals: Short Term Goals:  3 weeks  1.Report decreased lower back pain  < / =  5 /10  to increase tolerance for ADLs MET 08/21/2020  2. Increase lumbar ROM  By 5 degrees in order to improve functional mobility.    3. Increase strength by 1/3 MMT grade in B LE in order to improve tolerance for ADLs.  4. Pt to tolerate HEP to improve ROM and independence with ADL's MET 08/21/2020  6. Pt to improve Modified Oswestry score to 15/50 to demonstrate decreased disability and improved function. (22/50)        Long Term Goals: 6 weeks  1.Report decreased   Lower back pain  <   / =  3  /10  to increase tolerance for yard work  2. Increase lumbar ROM by 10 degrees in order to improve functional mobility.   3.Increase strength to >/= 4+/5 MMT grade for  BLE  to increase tolerance for ADL and work activities.  4. Pt to demonstrate negative Bridge Test in order to show improved core strength for lumbar stabilization.   5. Patient's goal: Be able to stand for longer than 30 minutes before having to sit due to low back pain.   6. Pt to improve Modified Oswestry score to </= 11/50 to demonstrate decreased disability an dimproved function.     Plan     Plan to continue with lumbar and core stability exercises, LE strengthening, improving overall functional  mobility, and decreasing pain.     Abdiel Younger, PT

## 2020-08-25 ENCOUNTER — TELEPHONE (OUTPATIENT)
Dept: UROGYNECOLOGY | Facility: CLINIC | Age: 50
End: 2020-08-25

## 2020-08-25 NOTE — TELEPHONE ENCOUNTER
----- Message from Naheed Valles LPN sent at 8/18/2020 11:37 AM CDT -----  I think we were trying to get a tier exception on myrbetriq because while it was approved, I believe it was around $200.      Documentation     You routed conversation to SULMA Pitts 4 days ago    You 4 days ago         Drug change request: Vesicare 5 mg tablet is not covered by the patients plan. The preferred alternative is oxybutynin, Trospium, . Did read in notes that both the oxybutynin taken 12/4/18-5/7/19 and the vesicare started 5/15/19 till present both caused dry mouth.   Checking to see if wanting to change to something else?

## 2020-09-01 ENCOUNTER — CLINICAL SUPPORT (OUTPATIENT)
Dept: REHABILITATION | Facility: HOSPITAL | Age: 50
End: 2020-09-01
Payer: MEDICARE

## 2020-09-01 DIAGNOSIS — M54.50 CHRONIC BILATERAL LOW BACK PAIN WITHOUT SCIATICA: Primary | ICD-10-CM

## 2020-09-01 DIAGNOSIS — R29.898 WEAKNESS OF BOTH LOWER EXTREMITIES: ICD-10-CM

## 2020-09-01 DIAGNOSIS — G89.29 CHRONIC BILATERAL LOW BACK PAIN WITHOUT SCIATICA: Primary | ICD-10-CM

## 2020-09-01 DIAGNOSIS — R29.3 POOR POSTURE: ICD-10-CM

## 2020-09-01 DIAGNOSIS — M53.86 DECREASED RANGE OF MOTION OF LUMBAR SPINE: ICD-10-CM

## 2020-09-01 PROCEDURE — 97110 THERAPEUTIC EXERCISES: CPT | Mod: KX

## 2020-09-01 PROCEDURE — 97530 THERAPEUTIC ACTIVITIES: CPT | Mod: KX

## 2020-09-01 NOTE — PROGRESS NOTES
"  Physical Therapy Treatment Note     Name: Sabrina Black  Clinic Number: [id number]    Therapy Diagnosis:   Encounter Diagnoses   Name Primary?    Chronic bilateral low back pain without sciatica Yes    Decreased range of motion of lumbar spine     Weakness of both lower extremities     Poor posture      Physician: Jessica Burton MD    Visit Date: 9/1/2020    Physician Orders: PT Eval and Treat   Medical Diagnosis from Referral:   M48.061 (ICD-10-CM) - Spinal stenosis, lumbar region, without neurogenic claudication   M48.02 (ICD-10-CM) - Cervical stenosis of spine   M50.30 (ICD-10-CM) - Degenerative disc disease, cervical      Evaluation Date: 7/28/2020  Authorization Period Expiration: 12/31/2020  Plan of Care Expiration: 09/18/2020  Visit # / Visits authorized: 7/10    Total visit count: 8    Time In: 7:58  Time Out: 8:45 am  Total Billable Time:  45 minutes    Precautions: Standard and Diabetes     Reassessment due on or before 09/21/2020    Subjective     Pt reports: "I am doing OK. I'd say that I am a 5/10. I go next Wednesday to get the pain stimulator put in my back. It's temporary the first two weeks and then they go back an put the permanent one in." PT acknowledges.     She was compliant with home exercise program. Pt states she feels she has improved 30% since initial evaluation.   Response to previous treatment: No adverse reactions reported.  Functional change: n/a    Pain: 5/10 Upon entering the clinic this day.  Location: bilateral back      Objective     Sabrina received therapeutic exercises to develop strength, endurance, ROM, flexibility, posture and core stabilization for 20 minutes including:  Nustep 6 minutes on Level 5 for endurance  Hamstring stretch 3 x 30 sec to BLEs  Piriformis 3 x 30 seated on mat to LLE  Cybex HSC with three plates 10 x 3  Cybex Leg press 5 plates 10 x 3 with cues to engage TrA   BUE Y's with OTB x 20    Following not performed:   DKTC with swissball 5 sec " hold 2 x 10   LTR with swissball 5 sec hold 2 x 10       Sabrina participated in dynamic functional therapeutic activities to improve functional performance for 25 minutes, including: postural correction exercises for improved body mechanics  -+sit to stand on blue foam without UE use  -+Cybex lat pull down on blue foam with 4 plates x 20  PPT 5 sec hold 2 x 10   Cybex scap pull with 3 plates 10 x 3   Seated horizontal abd with OTB x 20  TrA act 5 sec hold 2 x 10   Bridges 3 x 10   Sidelying hip abd 2 x 10 each (NP)      Sabrina received the following supervised modalities after being cleared for contradictions: TENS:  Sabrina received TENS electrical stimulation for pain to the lumbar region with MHP for a total of 0 minutes. Sabrina tolerated treatment well without any adverse effects.     Sabrina received hot pack for 0  minutes to lumbar region with E-stim .      Home Exercises Provided and Patient Education Provided     Education provided:   - Continue HEP and focus on what improvements in function she is experiencing.     Written Home Exercises Provided: Patient instructed to cont prior HEP.  Exercises were reviewed and Sabrina was able to demonstrate them prior to the end of the session.  Sabrina demonstrated good  understanding of the education provided.     See EMR under Patient Instructions for exercises provided prior visit.    Re-Assessment     Today, the patient enters with no new complaints. She still reports moderate pain. She has a normal gait pattern. Trunk extension during exercises at times was more difficult, but the patient was able to complete all with rest and VCs on how to safely progress. Patient was able to complete the PT session this day without an increase in her pain level. She remains compliant with all of her PT instructions.Pt is progressing as expected and has a Fair prognosis to progress towards and meet remaining goals.       Sabrina is progressing well towards her goals.   Pt prognosis  is Good.     Pt will continue to benefit from skilled outpatient physical therapy to address the deficits listed in the problem list box on initial evaluation, provide pt/family education and to maximize pt's level of independence in the home and community environment.     Pt's spiritual, cultural and educational needs considered and pt agreeable to plan of care and goals.     Anticipated barriers to physical therapy: Chronicity of condition     Goals: Short Term Goals:  3 weeks  1.Report decreased lower back pain  < / =  5 /10  to increase tolerance for ADLs MET 08/21/2020  2. Increase lumbar ROM  By 5 degrees in order to improve functional mobility.    3. Increase strength by 1/3 MMT grade in B LE in order to improve tolerance for ADLs.  4. Pt to tolerate HEP to improve ROM and independence with ADL's MET 08/21/2020  6. Pt to improve Modified Oswestry score to 15/50 to demonstrate decreased disability and improved function. (22/50)        Long Term Goals: 6 weeks  1.Report decreased   Lower back pain  <   / =  3  /10  to increase tolerance for yard work  2. Increase lumbar ROM by 10 degrees in order to improve functional mobility.   3.Increase strength to >/= 4+/5 MMT grade for  BLE  to increase tolerance for ADL and work activities.  4. Pt to demonstrate negative Bridge Test in order to show improved core strength for lumbar stabilization.   5. Patient's goal: Be able to stand for longer than 30 minutes before having to sit due to low back pain.   6. Pt to improve Modified Oswestry score to </= 11/50 to demonstrate decreased disability an dimproved function.     Plan     Plan to continue with lumbar and core stability exercises, LE strengthening, improving overall functional mobility, and decreasing pain.     Dillan Silvestre, PT

## 2020-09-04 ENCOUNTER — CLINICAL SUPPORT (OUTPATIENT)
Dept: REHABILITATION | Facility: HOSPITAL | Age: 50
End: 2020-09-04
Payer: MEDICARE

## 2020-09-04 DIAGNOSIS — M53.86 DECREASED RANGE OF MOTION OF LUMBAR SPINE: ICD-10-CM

## 2020-09-04 DIAGNOSIS — M54.50 CHRONIC BILATERAL LOW BACK PAIN WITHOUT SCIATICA: ICD-10-CM

## 2020-09-04 DIAGNOSIS — R29.3 POOR POSTURE: ICD-10-CM

## 2020-09-04 DIAGNOSIS — G89.29 CHRONIC BILATERAL LOW BACK PAIN WITHOUT SCIATICA: ICD-10-CM

## 2020-09-04 DIAGNOSIS — R29.898 WEAKNESS OF BOTH LOWER EXTREMITIES: ICD-10-CM

## 2020-09-04 PROCEDURE — 97112 NEUROMUSCULAR REEDUCATION: CPT | Mod: CQ

## 2020-09-04 PROCEDURE — 97110 THERAPEUTIC EXERCISES: CPT | Mod: CQ

## 2020-09-04 NOTE — PROGRESS NOTES
Physical Therapy Treatment Note     Name: Sabrina Black  Clinic Number: [id number]    Therapy Diagnosis:   Encounter Diagnoses   Name Primary?    Chronic bilateral low back pain without sciatica     Decreased range of motion of lumbar spine     Weakness of both lower extremities     Poor posture      Physician: Jessica Burton MD    Visit Date: 9/4/2020    Physician Orders: PT Eval and Treat   Medical Diagnosis from Referral:   M48.061 (ICD-10-CM) - Spinal stenosis, lumbar region, without neurogenic claudication   M48.02 (ICD-10-CM) - Cervical stenosis of spine   M50.30 (ICD-10-CM) - Degenerative disc disease, cervical      Evaluation Date: 7/28/2020  Authorization Period Expiration: 12/31/2020  Plan of Care Expiration: 09/18/2020  Visit # / Visits authorized: 8/10    Total visit count: 9    Time In: 9:00  Time Out: 9:45 am  Total Billable Time:  45 minutes    Precautions: Standard and Diabetes     Reassessment due on or before 09/21/2020    Subjective     Pt reports: Pain level 2-3/10.      She was compliant with home exercise program.   Response to previous treatment: Decreased pain  Functional change: n/a    Pain: 2/10 Upon entering the clinic this day.  Location: bilateral back      Objective     Sabrina received therapeutic exercises to develop strength, endurance, ROM, flexibility, posture and core stabilization for 20 minutes including:    Bike 6 minutes on Level 3 for endurance  Hamstring stretch 3 x 30 sec to BLEs  Piriformis 3 x 30 seated on mat to LLE  Cybex HSC with 4 plates 10 x 3  Cybex Leg press 5.5  plates 10 x 3 with cues to engage TrA   BUE Y's with OTB x 20    Following not performed:   DKTC with swissball 5 sec hold 2 x 10   LTR with swissball 5 sec hold 2 x 10       Sabrina participated in dynamic functional therapeutic activities to improve functional performance for 25 minutes, including: postural correction exercises for improved body mechanics  -+sit to stand on blue foam without  UE use 2 x 10  -+Cybex lat pull down on blue foam with 5 plates x 20  PPT 5 sec hold 2 x 10 with heel slides 10 x each side  Cybex scap pull with 3.5  plates 10 x 3   Seated horizontal abd with OTB x 20  Bridges with add squeeze 3 x 10   Sidelying hip abd 2 x 10 each (NP)      Sabrina received the following supervised modalities after being cleared for contradictions: TENS:  Sabrian received TENS electrical stimulation for pain to the lumbar region with MHP for a total of 0 minutes. Sabrina tolerated treatment well without any adverse effects.     Sabrina received hot pack for 0  minutes to lumbar region with E-stim .      Home Exercises Provided and Patient Education Provided     Education provided:   - Continue HEP and focus on what improvements in function she is experiencing.     Written Home Exercises Provided: Patient instructed to cont prior HEP.  Exercises were reviewed and Sabrina was able to demonstrate them prior to the end of the session.  Sabrina demonstrated good  understanding of the education provided.     See EMR under Patient Instructions for exercises provided prior visit.    Assessment     Today, the patient enters with decreased complaints of pain. Patient was able to complete the PT session this day without an increase in her pain level and tolerated several advancements as denoted above. She remains compliant with all of her PT instructions.Pt is progressing as expected and has a Fair prognosis to progress towards and meet remaining goals.     Sabrina is progressing well towards her goals.   Pt prognosis is Good.     Pt will continue to benefit from skilled outpatient physical therapy to address the deficits listed in the problem list box on initial evaluation, provide pt/family education and to maximize pt's level of independence in the home and community environment.     Pt's spiritual, cultural and educational needs considered and pt agreeable to plan of care and goals.     Anticipated barriers to  physical therapy: Chronicity of condition     Goals: Short Term Goals:  3 weeks  1.Report decreased lower back pain  < / =  5 /10  to increase tolerance for ADLs MET 08/21/2020  2. Increase lumbar ROM  By 5 degrees in order to improve functional mobility.    3. Increase strength by 1/3 MMT grade in B LE in order to improve tolerance for ADLs.  4. Pt to tolerate HEP to improve ROM and independence with ADL's MET 08/21/2020  6. Pt to improve Modified Oswestry score to 15/50 to demonstrate decreased disability and improved function. (22/50)        Long Term Goals: 6 weeks  1.Report decreased   Lower back pain  <   / =  3  /10  to increase tolerance for yard work  2. Increase lumbar ROM by 10 degrees in order to improve functional mobility.   3.Increase strength to >/= 4+/5 MMT grade for  BLE  to increase tolerance for ADL and work activities.  4. Pt to demonstrate negative Bridge Test in order to show improved core strength for lumbar stabilization.   5. Patient's goal: Be able to stand for longer than 30 minutes before having to sit due to low back pain.   6. Pt to improve Modified Oswestry score to </= 11/50 to demonstrate decreased disability an dimproved function.     Plan     Plan to continue with lumbar and core stability exercises, LE strengthening, improving overall functional mobility, and decreasing pain.     Dawn Fofana, PTA

## 2020-09-08 ENCOUNTER — DOCUMENTATION ONLY (OUTPATIENT)
Dept: REHABILITATION | Facility: HOSPITAL | Age: 50
End: 2020-09-08

## 2020-09-11 ENCOUNTER — CLINICAL SUPPORT (OUTPATIENT)
Dept: REHABILITATION | Facility: HOSPITAL | Age: 50
End: 2020-09-11
Payer: MEDICARE

## 2020-09-11 DIAGNOSIS — G89.29 CHRONIC BILATERAL LOW BACK PAIN WITHOUT SCIATICA: Primary | ICD-10-CM

## 2020-09-11 DIAGNOSIS — M54.50 CHRONIC BILATERAL LOW BACK PAIN WITHOUT SCIATICA: Primary | ICD-10-CM

## 2020-09-11 DIAGNOSIS — R29.3 POOR POSTURE: ICD-10-CM

## 2020-09-11 DIAGNOSIS — M53.86 DECREASED RANGE OF MOTION OF LUMBAR SPINE: ICD-10-CM

## 2020-09-11 DIAGNOSIS — R29.898 WEAKNESS OF BOTH LOWER EXTREMITIES: ICD-10-CM

## 2020-09-11 PROCEDURE — 97112 NEUROMUSCULAR REEDUCATION: CPT | Mod: KX

## 2020-09-11 PROCEDURE — 97110 THERAPEUTIC EXERCISES: CPT | Mod: KX

## 2020-09-11 NOTE — PROGRESS NOTES
"          Outpatient Therapy Discharge Summary     Name: Sabrina Black  Clinic Number: [id number]    Therapy Diagnosis:   Encounter Diagnoses   Name Primary?    Chronic bilateral low back pain without sciatica Yes    Decreased range of motion of lumbar spine     Weakness of both lower extremities     Poor posture      Physician: Jessica Burton MD    Physician Orders: PT Eval and Treat   Medical Diagnosis from Referral:   M48.061 (ICD-10-CM) - Spinal stenosis, lumbar region, without neurogenic claudication   M48.02 (ICD-10-CM) - Cervical stenosis of spine   M50.30 (ICD-10-CM) - Degenerative disc disease, cervical      Evaluation Date: 7/28/2020  Authorization Period Expiration: 12/31/2020  Plan of Care Expiration: 09/18/2020  Visit # / Visits authorized: 9/10    Total visit count: 10    Time In: 9:30 am  Time Out: 10:20 am  Total Billable Time:  45 minutes    Date of Last visit: 09/11/2020  Total Visits Received: Eval + 9 visits  Cancelled Visits: 3  No Show Visits: 0    Patient reports: "I got an epidural this past Wednesday. I'd say my pain is like a 2/10. I am still looking into getting the pain stimulator." PT acknowledges.    Pain is 2/10      Objective     Sabrina received therapeutic exercises to develop strength, endurance, ROM, flexibility, posture and core stabilization for 20 minutes including:  Bike 6 minutes on Level 3 for endurance  Hamstring stretch 3 x 30 sec to BLEs  Piriformis 3 x 30 seated on mat to LLE  Cybex HSC with 4 plates 10 x 3  Cybex Leg press 5.5  plates 10 x 3 with cues to engage TrA   BUE Y's with OTB x 20    Sabrina participated in dynamic functional therapeutic activities to improve functional performance for 25 minutes, including: postural correction exercises for improved body mechanics  -sit to stand on blue foam without UE use 2 x 10  -Cybex lat pull down on blue foam with 5 plates x 20  PPT 5 sec hold 2 x 10 with heel slides 10 x each side  Cybex scap pull with 3.5  " plates 10 x 3   Seated horizontal abd with OTB x 20  Bridges with add squeeze 3 x 10   Sidelying hip abd 2 x 10 each        RLE Strength Grade LLE Strength Grade   Hip Flexion 4+/5 Hip Flexion 4+/5   Hip Extension 4/5 >4-/5 Hip Extension 4/5 >4-/5   Hip Abduction 4+/5 Hip Abduction 4+/5 >4/5   Hip Adduction 4+/5 >4/5 Hip Adduction 4+/5 >4/5   Hip Internal Rotation 4+/5 Hip Internal Rotation 4+/5   Hip External Rotation 4+/5 Hip External Rotation 4+/5   Knee Flexion 5/5 Knee Flexion 5/5   Knee Extension 5/5 Knee Extension 5/5   Ankle Dorsiflexion 5/5 Ankle Dorsiflexion 5/5        AROM (Degrees) Pain/Dysfunctional Movement  Comments   Flexion 55 >50 Pain      Extension 15 Pain      Right Side Bend 20 >10 No Pain      Left Side Bend 20 >10 No Pain      Right Rotation 35 No Pain      Left Rotation 35 No Pain         IZZY:      14/50 = 28% Disability >  22/50 or 44% disability     Patient able to do an eccentric bridge without complaints.    Assessment      Goals: Short Term Goals:  3 weeks  1.Report decreased lower back pain  < / =  5 /10  to increase tolerance for ADLs MET 08/21/2020  2. Increase lumbar ROM  By 5 degrees in order to improve functional mobility.    3. Increase strength by 1/3 MMT grade in B LE in order to improve tolerance for ADLs.  4. Pt to tolerate HEP to improve ROM and independence with ADL's MET 08/21/2020  6. Pt to improve Modified Oswestry score to 15/50 to demonstrate decreased disability and improved function. (22/50)      Long Term Goals: 6 weeks  1.Report decreased   Lower back pain  <   / =  3  /10  to increase tolerance for yard work Goal Met 9/11/2020  2. Increase lumbar ROM by 10 degrees in order to improve functional mobility. In Progress 9/11/2020  3. Increase strength to >/= 4+/5 MMT grade for  BLE  to increase tolerance for ADL and work activities. In Progress 9/11/2020  4. Pt to demonstrate negative Bridge Test in order to show improved core strength for lumbar stabilization. Goal  Met 9/11/2020  5. Patient's goal: Be able to stand for longer than 30 minutes before having to sit due to low back pain. Goal Met 9/11/2020 based on doing yardwork  6. Pt to improve Modified Oswestry score to </= 11/50 to demonstrate decreased disability an dimproved function. In Progress 9/11/2020 14/50 at OR.    Discharge reason: Patient has reached the maximum rehab potential for the present time and the patient requested discharge to see if epidural helps and if pain stimulator will be needed. PT agrees.    Plan   This patient is discharged from Physical Therapy at this time.    Dillan Silvestre, PT

## 2020-11-03 ENCOUNTER — OFFICE VISIT (OUTPATIENT)
Dept: UROGYNECOLOGY | Facility: CLINIC | Age: 50
End: 2020-11-03
Payer: MEDICARE

## 2020-11-03 VITALS
HEART RATE: 75 BPM | RESPIRATION RATE: 18 BRPM | DIASTOLIC BLOOD PRESSURE: 72 MMHG | TEMPERATURE: 98 F | BODY MASS INDEX: 37.43 KG/M2 | HEIGHT: 68 IN | WEIGHT: 246.94 LBS | SYSTOLIC BLOOD PRESSURE: 127 MMHG

## 2020-11-03 DIAGNOSIS — N39.41 URGE INCONTINENCE OF URINE: ICD-10-CM

## 2020-11-03 DIAGNOSIS — R35.0 URINARY FREQUENCY: Primary | ICD-10-CM

## 2020-11-03 PROCEDURE — 99999 PR PBB SHADOW E&M-EST. PATIENT-LVL V: CPT | Mod: PBBFAC,,, | Performed by: NURSE PRACTITIONER

## 2020-11-03 PROCEDURE — 99213 PR OFFICE/OUTPT VISIT, EST, LEVL III, 20-29 MIN: ICD-10-PCS | Mod: S$PBB,,, | Performed by: NURSE PRACTITIONER

## 2020-11-03 PROCEDURE — 99999 PR PBB SHADOW E&M-EST. PATIENT-LVL V: ICD-10-PCS | Mod: PBBFAC,,, | Performed by: NURSE PRACTITIONER

## 2020-11-03 PROCEDURE — 99215 OFFICE O/P EST HI 40 MIN: CPT | Mod: PBBFAC,PO | Performed by: NURSE PRACTITIONER

## 2020-11-03 PROCEDURE — 99213 OFFICE O/P EST LOW 20 MIN: CPT | Mod: S$PBB,,, | Performed by: NURSE PRACTITIONER

## 2020-11-03 RX ORDER — FLUOXETINE HYDROCHLORIDE 20 MG/1
CAPSULE ORAL
COMMUNITY
Start: 2020-09-23

## 2020-11-03 RX ORDER — HYDROCODONE BITARTRATE AND ACETAMINOPHEN 7.5; 325 MG/1; MG/1
TABLET ORAL
COMMUNITY
Start: 2020-09-18 | End: 2021-09-08

## 2020-11-03 RX ORDER — BACLOFEN 10 MG/1
10 TABLET ORAL
COMMUNITY
Start: 2020-10-01

## 2020-11-03 NOTE — PROGRESS NOTES
Subjective:       Patient ID: Sabrina Black is a 50 y.o. female.    Chief Complaint: Urinary Frequency      Sabrina Black is a 50 y.o. female who presents today for follow up in regards to her urinary frequency and myrbetriq.  She has been taking the myrbetriq for the past several months and has been doing well with it.  She denies any S.E.  She has frequency x 5-6 during the day and denies any nocturia.  She denies any incontinence.  Overall, she is very happy with her status and would like to continue with the myrbetriq.  We were able to get a tier exception for her so that it was not so expensive.  We will attempt to get the tier exception in 2021 if needed.    Review of Systems   Constitutional: Negative for activity change, fever and unexpected weight change.   HENT: Negative for hearing loss.    Eyes: Negative for visual disturbance.   Respiratory: Negative for shortness of breath and wheezing.    Cardiovascular: Negative for chest pain, palpitations and leg swelling.   Gastrointestinal: Negative for abdominal pain, constipation and diarrhea.   Genitourinary: Positive for frequency. Negative for dyspareunia, dysuria, vaginal bleeding and vaginal discharge.   Musculoskeletal: Negative for gait problem and neck pain.   Skin: Negative for rash and wound.   Allergic/Immunologic: Negative for immunocompromised state.   Neurological: Negative for tremors, speech difficulty and weakness.   Hematological: Does not bruise/bleed easily.   Psychiatric/Behavioral: Negative for agitation and confusion.       Objective:      Physical Exam  Constitutional:       General: She is not in acute distress.     Appearance: She is well-developed.   HENT:      Head: Normocephalic and atraumatic.   Neck:      Musculoskeletal: Neck supple.      Thyroid: No thyromegaly.   Pulmonary:      Effort: Pulmonary effort is normal. No respiratory distress.   Abdominal:      Palpations: Abdomen is soft.      Tenderness: There is no  abdominal tenderness.      Hernia: No hernia is present.   Musculoskeletal: Normal range of motion.   Skin:     General: Skin is warm and dry.      Findings: No rash.   Neurological:      Mental Status: She is alert and oriented to person, place, and time.   Psychiatric:         Behavior: Behavior normal.       Pelvic Exam:  deferred      Assessment:       1. Urinary frequency    2. Urge incontinence of urine        Plan:       Urinary frequency- continue myrbetriq as noted below  -     mirabegron (MYRBETRIQ) 50 mg Tb24; Take 1 tablet (50 mg total) by mouth once daily.  Dispense: 90 tablet; Refill: 3    Urge incontinence of urine- as noted below  -     mirabegron (MYRBETRIQ) 50 mg Tb24; Take 1 tablet (50 mg total) by mouth once daily.  Dispense: 90 tablet; Refill: 3    RTC 1 year or PRN

## 2020-11-18 ENCOUNTER — LAB VISIT (OUTPATIENT)
Dept: PRIMARY CARE CLINIC | Facility: OTHER | Age: 50
End: 2020-11-18
Attending: INTERNAL MEDICINE
Payer: MEDICARE

## 2020-11-18 DIAGNOSIS — R05.9 COUGH: ICD-10-CM

## 2020-11-18 PROCEDURE — U0003 INFECTIOUS AGENT DETECTION BY NUCLEIC ACID (DNA OR RNA); SEVERE ACUTE RESPIRATORY SYNDROME CORONAVIRUS 2 (SARS-COV-2) (CORONAVIRUS DISEASE [COVID-19]), AMPLIFIED PROBE TECHNIQUE, MAKING USE OF HIGH THROUGHPUT TECHNOLOGIES AS DESCRIBED BY CMS-2020-01-R: HCPCS

## 2020-11-20 LAB — SARS-COV-2 RNA RESP QL NAA+PROBE: NOT DETECTED

## 2020-11-25 ENCOUNTER — DOCUMENTATION ONLY (OUTPATIENT)
Dept: REHABILITATION | Facility: HOSPITAL | Age: 50
End: 2020-11-25

## 2020-11-25 NOTE — PROGRESS NOTES
"  [facility] Outpatient Therapy Discharge Summary   8/05-9/25/2019  Name: Sabrina Black  Clinic Number: [id number]  Therapy Diagnosis: Bilateral wrist pain  Physician:Yasmine Arenas NP     Physician Orders: Eval and treat  Medical Diagnosis: bilateral carpal tunnel syndrome  Surgical Procedure and Date: N/A  Eval Date: 8/05/2019       [place] 9/25/2019   Insurance Authorization Period Expiration: 8/07/2019-9/27/2019  Plan of Care Certification Period: 8/05/2019-9/27/2019  Total Visits Received: 12  Cancelled Visits: 0  No Show Visits: 0    Subjective   Patient reports: "Therapy is helping.  Went to see the carpal tunnel doctor yesterday.  Going to send me to get injections in my wrist.  Dropped a lot of things this morning.  Right -tingling every day.  Not waking up with pain.  Hurts after activity and increases tingling as using it.      Objective      ADLs/IADLs:                           - Feeding:independent at times some difficulty with holding utensil-dropping utensils occasionally                          - Bathing: no difficulty                          - Dressing/Grooming: doing okay with fasteners, better with blow drying hair noting arms get weak                          - Household tasks:  Unable to maintain hold on handles i.e broom  and dropping dishes as washing them, better with lifting of objects with weight and doing better with opening containers                          - Leisure: Needlework/Sewing-quilting- can only do a little at a time     Quick DASH:  8/05/2019 -40 disability/symptom score     9/11/2019- 40 disability/symptom score    9/25/2019= 25 disability/symptom score     Observation:  No swelling or atrophy noted throughout both hands  Palpation:no longer with  mild tenderness at mid dorsal wrist     Fingers AROM -full fist and fingers extension full bilaterally                   AROM            8/05/2019 9/11/2019                " 9/25/2019  Wrist:            Right      Left            Right     Left             Right    Left    Flexion          50          50               55         55                55       65    Extension     60          60               75          65               70       75    UD                30          30               30         30                30    RD                20          20               20         20                20     MMT: 8/05/2019 and 9/11/2019  Fingers and thumb  all flexors 5/5 and wrist flexors and extensors 5/5.                                               8/05/2019 9/11/2019 9/25/2019   (in lbs) Trial   1            2        3          Average              Trial  1            2             3         Average                    Trial  1            2             3         Average    Right                    40         50       40            43                               50          45           52           49                                     54          50            50         51    Left                      34         30       35            33                               42           38          35           38                                     42          44            40         42     Pinches (in psi)  Ave of 2 trials                                        8/05/2019 9/11/2019 9/25/2019                                  Right         Left                 Right        Left                  Right        Left     Lateral                     12             13                  12.5            13                      14.5       15.5    3 jaw beti              9               8.5                 9.5             10.5                   12.5        12.5    Tip                            7.5             7                     9.5              8                         9.5       9.5                                                                                  9/25                   Edema:  3 cm below elbow crease    Right   29 cm          Left   27.3 cm                 5 cm above wrist crease     Right   17.2 cm        Left   17.5  cm    Assessment    Patient with decrease in frequency and intensity of right hand tingling/numbness; which continues to occur daily depending on use and continues dropping things frequently.  Left hand with occasional discomfort.  Her wrists AROM improved to grossly WNL.  Her MMT of wrist mm continues to be 5/5.  Her  strengths have improved, however, right is 14# less than age mean and left is 5# less than age mean.  Her pinch strengths are 2 to 4.5# than age means. She reports improved ability to perform self care and some daily activities.  Her Quick DASH score improved now with minimal limitations noted.      Goals:   Short Term Goals    Goal   Met Improve bilateral  and pinch strengths and decrease numbness in order for patient to be able to perform all grooming with greater ease and with AD as needed in 3 weeks   Met Increase bilateral  and pinch strengths decrease numbness in order for patient to be able to open screw top in 4 weeks   Met Reduce numbness in bilateral hands in order for patient to be able to use knife to cut with greater ease with use of AD as needed in 4 weeks   Met  Met  Met Reduce numbness and improve bilateral hand strength in order for patient to be able to carry grocery bags with greater ease in 4 weeks  Improve pinch strengths and reduce numbness in order for patient to be able to perform fasteners with greater ease and with AD as needed in 3 weeks  Patient independent in home ex program of median n glides and jt blocking of PIP and DIP flexion in 3 visits      Long Term Goals    Goal   INot met Improve score of Quick DASH by 20  points to indicate improved functional status in 6 weeks   No met Increase bilateral  and pinch strengths, and decrease numbness in order for patient to be able to perform complex meal preparation and household clean up in 6 weeks   Not met Increase right wrist ROM and bilateral  and pinch strengths and decrease pain in order for patient to be to able to reach into refrigerator and cabinets with up to 5 lbs. in 6 weeks   Partially Met  Not Met Increase bilateral  strengths by 8# and pinch strengths up to 5 lbs in 6 weeks  Patient reports able to use keyboard and phone for ~ 15 with no increase in symptoms in 6 weeks         Plan   Discharge from skilled OT -no continuation orders received upon request        HALIE Bueno

## 2021-02-19 ENCOUNTER — OFFICE VISIT (OUTPATIENT)
Dept: OPTOMETRY | Facility: CLINIC | Age: 51
End: 2021-02-19
Payer: MEDICARE

## 2021-02-19 ENCOUNTER — OFFICE VISIT (OUTPATIENT)
Dept: OPTOMETRY | Facility: CLINIC | Age: 51
End: 2021-02-19
Payer: COMMERCIAL

## 2021-02-19 DIAGNOSIS — Z01.00 EXAMINATION OF EYES AND VISION: Primary | ICD-10-CM

## 2021-02-19 DIAGNOSIS — H52.7 REFRACTIVE ERROR: ICD-10-CM

## 2021-02-19 DIAGNOSIS — Z46.0 CONTACT LENS/GLASSES FITTING: Primary | ICD-10-CM

## 2021-02-19 DIAGNOSIS — H25.13 NUCLEAR SCLEROSIS, BILATERAL: ICD-10-CM

## 2021-02-19 PROCEDURE — 99499 NO LOS: ICD-10-PCS | Mod: S$GLB,,, | Performed by: OPTOMETRIST

## 2021-02-19 PROCEDURE — 92310 PR CONTACT LENS FITTING (NO CHANGE): ICD-10-PCS | Mod: S$GLB,,, | Performed by: OPTOMETRIST

## 2021-02-19 PROCEDURE — 92015 DETERMINE REFRACTIVE STATE: CPT | Mod: S$GLB,,, | Performed by: OPTOMETRIST

## 2021-02-19 PROCEDURE — 99999 PR PBB SHADOW E&M-EST. PATIENT-LVL III: CPT | Mod: PBBFAC,,, | Performed by: OPTOMETRIST

## 2021-02-19 PROCEDURE — 99499 UNLISTED E&M SERVICE: CPT | Mod: S$GLB,,, | Performed by: OPTOMETRIST

## 2021-02-19 PROCEDURE — 99999 PR PBB SHADOW E&M-EST. PATIENT-LVL III: ICD-10-PCS | Mod: PBBFAC,,, | Performed by: OPTOMETRIST

## 2021-02-19 PROCEDURE — 92004 COMPRE OPH EXAM NEW PT 1/>: CPT | Mod: S$GLB,,, | Performed by: OPTOMETRIST

## 2021-02-19 PROCEDURE — 92004 PR EYE EXAM, NEW PATIENT,COMPREHESV: ICD-10-PCS | Mod: S$GLB,,, | Performed by: OPTOMETRIST

## 2021-02-19 PROCEDURE — 92015 PR REFRACTION: ICD-10-PCS | Mod: S$GLB,,, | Performed by: OPTOMETRIST

## 2021-02-19 PROCEDURE — 99999 PR PBB SHADOW E&M-EST. PATIENT-LVL II: CPT | Mod: PBBFAC,,, | Performed by: OPTOMETRIST

## 2021-02-19 PROCEDURE — 99999 PR PBB SHADOW E&M-EST. PATIENT-LVL II: ICD-10-PCS | Mod: PBBFAC,,, | Performed by: OPTOMETRIST

## 2021-02-19 PROCEDURE — 92310 CONTACT LENS FITTING OU: CPT | Mod: S$GLB,,, | Performed by: OPTOMETRIST

## 2021-02-25 ENCOUNTER — OFFICE VISIT (OUTPATIENT)
Dept: OPTOMETRY | Facility: CLINIC | Age: 51
End: 2021-02-25
Payer: MEDICARE

## 2021-02-25 DIAGNOSIS — Z46.0 CONTACT LENS/GLASSES FITTING: Primary | ICD-10-CM

## 2021-02-25 PROCEDURE — 99499 UNLISTED E&M SERVICE: CPT | Mod: S$GLB,,, | Performed by: OPTOMETRIST

## 2021-02-25 PROCEDURE — 1126F AMNT PAIN NOTED NONE PRSNT: CPT | Mod: S$GLB,,, | Performed by: OPTOMETRIST

## 2021-02-25 PROCEDURE — 1126F PR PAIN SEVERITY QUANTIFIED, NO PAIN PRESENT: ICD-10-PCS | Mod: S$GLB,,, | Performed by: OPTOMETRIST

## 2021-02-25 PROCEDURE — 99499 NO LOS: ICD-10-PCS | Mod: S$GLB,,, | Performed by: OPTOMETRIST

## 2021-09-08 PROBLEM — L72.0 EPIDERMAL CYST: Status: ACTIVE | Noted: 2021-09-08

## 2022-10-12 ENCOUNTER — HOSPITAL ENCOUNTER (OUTPATIENT)
Dept: RADIOLOGY | Facility: HOSPITAL | Age: 52
Discharge: HOME | End: 2022-10-12
Attending: ANESTHESIOLOGY
Payer: MEDICARE

## 2022-10-12 ENCOUNTER — HOSPITAL ENCOUNTER (OUTPATIENT)
Dept: PREADMISSION TESTING | Facility: HOSPITAL | Age: 52
Discharge: HOME | End: 2022-10-12
Attending: INTERNAL MEDICINE
Payer: MEDICARE

## 2022-10-12 DIAGNOSIS — Z01.818 PRE-OP TESTING: ICD-10-CM

## 2022-10-12 DIAGNOSIS — Z01.818 PRE-OP TESTING: Primary | ICD-10-CM

## 2022-10-12 PROCEDURE — 93005 ELECTROCARDIOGRAM TRACING: CPT | Performed by: SPECIALIST

## 2022-10-12 PROCEDURE — 93010 EKG 12-LEAD: ICD-10-PCS | Mod: ,,, | Performed by: SPECIALIST

## 2022-10-12 PROCEDURE — 71046 X-RAY EXAM CHEST 2 VIEWS: CPT | Mod: TC

## 2022-10-12 PROCEDURE — 93010 ELECTROCARDIOGRAM REPORT: CPT | Mod: ,,, | Performed by: SPECIALIST

## 2022-10-13 ENCOUNTER — HOSPITAL ENCOUNTER (OUTPATIENT)
Facility: HOSPITAL | Age: 52
Discharge: HOME | End: 2022-10-13
Attending: INTERNAL MEDICINE | Admitting: INTERNAL MEDICINE
Payer: MEDICARE

## 2022-10-13 ENCOUNTER — ANESTHESIA (OUTPATIENT)
Dept: SURGERY | Facility: HOSPITAL | Age: 52
End: 2022-10-13
Payer: MEDICARE

## 2022-10-13 ENCOUNTER — ANESTHESIA EVENT (OUTPATIENT)
Dept: SURGERY | Facility: HOSPITAL | Age: 52
End: 2022-10-13
Payer: MEDICARE

## 2022-10-13 VITALS
RESPIRATION RATE: 12 BRPM | HEART RATE: 80 BPM | TEMPERATURE: 98 F | OXYGEN SATURATION: 100 % | SYSTOLIC BLOOD PRESSURE: 157 MMHG | DIASTOLIC BLOOD PRESSURE: 86 MMHG

## 2022-10-13 VITALS
DIASTOLIC BLOOD PRESSURE: 80 MMHG | HEART RATE: 77 BPM | TEMPERATURE: 98 F | SYSTOLIC BLOOD PRESSURE: 149 MMHG | RESPIRATION RATE: 16 BRPM | OXYGEN SATURATION: 97 %

## 2022-10-13 DIAGNOSIS — Z12.11 SCREENING FOR COLON CANCER: ICD-10-CM

## 2022-10-13 PROCEDURE — 27000671 HC TUBING MICROBORE EXT: Performed by: ANESTHESIOLOGY

## 2022-10-13 PROCEDURE — 37000009 HC ANESTHESIA EA ADD 15 MINS: Performed by: INTERNAL MEDICINE

## 2022-10-13 PROCEDURE — 27000675 HC TUBING MICRODRIP: Performed by: ANESTHESIOLOGY

## 2022-10-13 PROCEDURE — 88305 TISSUE EXAM BY PATHOLOGIST: CPT | Mod: TC

## 2022-10-13 PROCEDURE — 25000003 PHARM REV CODE 250: Performed by: INTERNAL MEDICINE

## 2022-10-13 PROCEDURE — 25000003 PHARM REV CODE 250: Performed by: NURSE ANESTHETIST, CERTIFIED REGISTERED

## 2022-10-13 PROCEDURE — 27201089 HC SNARE, DISP (ANY): Performed by: INTERNAL MEDICINE

## 2022-10-13 PROCEDURE — 45385 COLONOSCOPY W/LESION REMOVAL: CPT | Performed by: INTERNAL MEDICINE

## 2022-10-13 PROCEDURE — 27202103: Performed by: ANESTHESIOLOGY

## 2022-10-13 PROCEDURE — 63600175 PHARM REV CODE 636 W HCPCS: Performed by: NURSE ANESTHETIST, CERTIFIED REGISTERED

## 2022-10-13 PROCEDURE — 27201114 HC TRAP (ANY): Performed by: INTERNAL MEDICINE

## 2022-10-13 PROCEDURE — 27100019 HC AMBU BAG ADULT/PED: Performed by: ANESTHESIOLOGY

## 2022-10-13 PROCEDURE — 37000008 HC ANESTHESIA 1ST 15 MINUTES: Performed by: INTERNAL MEDICINE

## 2022-10-13 RX ORDER — PROPOFOL 10 MG/ML
VIAL (ML) INTRAVENOUS
Status: DISCONTINUED | OUTPATIENT
Start: 2022-10-13 | End: 2022-10-13

## 2022-10-13 RX ADMIN — PROPOFOL 50 MG: 10 INJECTION, EMULSION INTRAVENOUS at 09:10

## 2022-10-13 RX ADMIN — SODIUM CHLORIDE: 0.9 INJECTION, SOLUTION INTRAVENOUS at 09:10

## 2022-10-13 NOTE — TRANSFER OF CARE
Anesthesia Transfer of Care Note    Patient: Sabrina Black    Procedure(s) Performed: Procedure(s) (LRB):  COLONOSCOPY (N/A)    Patient location: GI    Anesthesia Type: MAC    Transport from OR: Transported from OR on room air with adequate spontaneous ventilation    Post pain: adequate analgesia    Post assessment: no apparent anesthetic complications    Post vital signs: stable    Level of consciousness: awake and alert    Nausea/Vomiting: no nausea/vomiting    Complications: none    Transfer of care protocol was followed      Last vitals:   Visit Vitals  BP (!) 157/86 (BP Location: Left arm, Patient Position: Lying)   Pulse 81   Temp 36.3 °C (97.3 °F) (Oral)   Resp 18   SpO2 100%   Breastfeeding No

## 2022-10-13 NOTE — [employer] PATIENT INSTRUCTIONS
Discharge Summary/Instructions after an Endoscopic Procedure  Patient Name: Sabrina Black  Patient MRN: [id number]  Patient YOB: 1970 Thursday, October 13, 2022  Kelechi Shehean MD  RESTRICTIONS:  During your procedure today, you received medications for sedation.  These   medications may affect your judgment, balance and coordination.  Therefore,   for 24 hours, you have the following restrictions:   - DO NOT drive a car, operate machinery, make legal/financial decisions,   sign important papers or drink alcohol.    ACTIVITY:  Today: no heavy lifting, straining or running due to procedural   sedation/anesthesia.  The following day: return to full activity including work.  DIET:  Eat and drink normally unless instructed otherwise.     TREATMENT FOR COMMON SIDE EFFECTS:  - Mild abdominal pain, nausea, belching, bloating or excessive gas:  rest,   eat lightly and use a heating pad.  - Sore Throat: treat with throat lozenges and/or gargle with warm salt   water.  - Because air was used during the procedure, expelling large amounts of air   from your rectum or belching is normal.  - If a bowel prep was taken, you may not have a bowel movement for 1-3 days.    This is normal.  SYMPTOMS TO WATCH FOR AND REPORT TO YOUR PHYSICIAN:  1. Abdominal pain or bloating, other than gas cramps.  2. Chest pain.  3. Back pain.  4. Signs of infection such as: chills or fever occurring within 24 hours   after the procedure.  5. Rectal bleeding, which would show as bright red, maroon, or black stools.   (A tablespoon of blood from the rectum is not serious, especially if   hemorrhoids are present.)  6. Vomiting.  7. Weakness or dizziness.  GO DIRECTLY TO THE NEAREST EMERGENCY ROOM IF YOU HAVE ANY OF THE FOLLOWING:      Difficulty breathing              Chills and/or fever over 101 F   Persistent vomiting and/or vomiting blood   Severe abdominal pain   Severe chest pain   Black, tarry stools   Bleeding- more than one  tablespoon   Any other symptom or condition that you feel may need urgent attention  Your doctor recommends these additional instructions:  If any biopsies were taken, your doctors clinic will contact you in 1 to 2   weeks with any results.  - Patient has a contact number available for emergencies.  The signs and   symptoms of potential delayed complications were discussed with the   patient.  Return to normal activities tomorrow.  Written discharge   instructions were provided to the patient.   - Resume previous diet.   - Continue present medications.   - Await pathology results.   - Repeat colonoscopy in 3 years for surveillance.   - Return to GI clinic PRN.   - Discharge patient to home (with escort).  For questions, problems or results please call your physician - Kelechi Sheehan MD at Work:  [phone].  [facility] [place], EMERGENCY ROOM PHONE NUMBER: [phone]  IF A COMPLICATION OR EMERGENCY SITUATION ARISES AND YOU ARE UNABLE TO REACH   YOUR PHYSICIAN - GO DIRECTLY TO THE EMERGENCY ROOM.  MD Kelechi Corado MD  10/13/2022 9:45:03 AM  This report has been verified and signed electronically.  Dear patient,  As a result of recent federal legislation (The Federal Cures Act), you may   receive lab or pathology results from your procedure in your [facility]   account before your physician is able to contact you. Your physician or   their representative will relay the results to you with their   recommendations at their soonest availability.  Thank you,  [employer]

## 2022-10-13 NOTE — H&P
"GASTROENTEROLOGY PRE-PROCEDURE H&P NOTE  Patient Name: Sabrina Black  Patient MRN: [id number]  Patient : 1970    Service date: 10/13/2022    PCP: Qasim Galindo MD    No chief complaint on file.      HPI: Patient is a 52 y.o. female with PMHx as below here for evaluation of     Sabrina Black is a 52 year old female patient who is seen today for an initial visit. 52 year old female with history of bipolar, pancreatitis - gallstone related presenting for positive cologuard. last colon 5 yrs ago at [facility]. no complaints some constipation. no bleeding. no fam hx crc      Chart Review    +Cologuard.     Past Medical History:  Past Medical History:   Diagnosis Date    Bipolar 1 disorder     Diabetes mellitus, type 2     pt states "no longer diabetic since I lost 100lbs"    Hyperlipidemia 2017    Knee injury     Lumbar pain     herniated disc     Parkinson's disease     Thyroid disease         Past Surgical History:  Past Surgical History:   Procedure Laterality Date    ABLATION      CARPAL TUNNEL RELEASE Right 2019    Procedure: RELEASE, CARPAL TUNNEL;  Surgeon: Alfredo Blanc MD;  Location: [facility];  Service: Orthopedics;  Laterality: Right;    CHOLECYSTECTOMY  2018    Dr. Petersen     knee and back surgery      Back surgery 1994    KNEE SURGERY Right 2019    LAPAROSCOPIC GASTRIC BANDING      Gastric sleeve    NECK SURGERY      neck     REMOVAL OF BONE SPUR OF FOOT Right     TOTAL KNEE ARTHROPLASTY Right 2019        Home Medications:  No medications prior to admission.       Inpatient Medications:        Review of patient's allergies indicates:   Allergen Reactions    Pcn [penicillins] Hives    Morphine Hallucinations    Tizanidine Other (See Comments)     Mouth sores       Social History:   Social History     Occupational History    Not on file   Tobacco Use    Smoking status: Former     Packs/day: 1.00     Years: 20.00     Pack years: 20.00     " Types: Cigarettes     Quit date:      Years since quittin.7    Smokeless tobacco: Never   Substance and Sexual Activity    Alcohol use: No    Drug use: No    Sexual activity: Not Currently       Family History:   Family History   Problem Relation Age of Onset    Cancer Father     Diabetes Father     Hyperlipidemia Father     Cancer Paternal Aunt     Cancer Maternal Grandfather     Hyperlipidemia Maternal Grandfather     Hyperlipidemia Paternal Grandmother     Hyperlipidemia Paternal Grandfather        Review of Systems:  A 10 point review of systems was performed and was normal, except as mentioned in the HPI, including constitutional, HEENT, heme, lymph, cardiovascular, respiratory, gastrointestinal, genitourinary, neurologic, endocrine, psychiatric and musculoskeletal.      OBJECTIVE:    Physical Exam:  24 Hour Vital Sign Ranges:    Most recent vitals: There were no vitals taken for this visit.   GEN: well-developed, well-nourished, awake and alert, non-toxic appearing adult  HEENT: PERRL, sclera anicteric, oral mucosa pink and moist without lesion  NECK: trachea midline; Good ROM  CV: regular rate and rhythm, no murmurs or gallops  RESP: clear to auscultation bilaterally, no wheezes, rhonci or rales  ABD: soft, non-tender, non-distended, normal bowel sounds  EXT: no swelling or edema, 2+ pulses distally  SKIN: no rashes or jaundice  PSYCH: normal affect    Labs:   Recent Labs     10/12/22  1410   WBC 7.37   MCV 88        Recent Labs     10/12/22  1410      K 3.9      CO2 30*   BUN 15   *     No results for input(s): ALB in the last 72 hours.    Invalid input(s): ALKP, SGOT, SGPT, TBIL, DBIL, TPRO  No results for input(s): PT, INR, PTT in the last 72 hours.      IMPRESSION / RECOMMENDATIONS:  Sabrina Black is a 52 year old female patient who is seen today for an initial visit. 52 year old female with history of bipolar, pancreatitis - gallstone related presenting for positive  cologuard. last colon 5 yrs ago at [facility]. no complaints some constipation. no bleeding. no fam hx crc      Chart Review    +Cologuard    colonoscopy  with interventions as warranted.   RIsks, benefits, alternatives discussed in detail regarding upcoming procedures and sedation. Some of the more common endoscopic complications include but not limited to immediate or delayed perforation, bleeding, infections, pain, inadvertent injury to surrounding tissue / organs and possible need for surgical evaluation. Patient expressed understanding, all questions answered and will proceed with procedure as planned.     Kelechi Sheehan  10/13/2022  8:08 AM

## 2022-10-13 NOTE — ANESTHESIA POSTPROCEDURE EVALUATION
Anesthesia Post Evaluation    Patient: Sabrina Black    Procedure(s) Performed: Procedure(s) (LRB):  COLONOSCOPY (N/A)    Final Anesthesia Type: MAC      Patient location during evaluation: GI PACU  Patient participation: Yes- Able to Participate  Level of consciousness: awake and alert and oriented  Post-procedure vital signs: reviewed and stable  Pain management: adequate  Airway patency: patent    PONV status at discharge: No PONV  Anesthetic complications: no      Cardiovascular status: blood pressure returned to baseline, hemodynamically stable and stable  Respiratory status: unassisted, spontaneous ventilation and room air  Hydration status: euvolemic  Follow-up not needed.          Vitals Value Taken Time   /77 10/13/22 1052   Temp 98 F 10/13/22 1145   Pulse 70 10/13/22 1052   Resp 16 10/13/22 1010   SpO2 98 % 10/13/22 1052   Vitals shown include unvalidated device data.      No case tracking events are documented in the log.      Pain/Anthony Score: No data recorded

## 2022-10-13 NOTE — ANESTHESIA PREPROCEDURE EVALUATION
10/13/2022  Sabrina Black is a 52 y.o., female.  Patient Active Problem List   Diagnosis    Total knee replacement status, right    Knee pain, right    Gait abnormality    Left wrist pain    Right wrist pain    Carpal tunnel syndrome on right    Chronic lower back pain    Decreased range of motion of lumbar spine    Lower extremity weakness    Poor posture    Epidermal cyst       Past Surgical History:   Procedure Laterality Date    ABLATION  1999    CARPAL TUNNEL RELEASE Right 12/11/2019    Procedure: RELEASE, CARPAL TUNNEL;  Surgeon: Alfredo Blanc MD;  Location: [facility];  Service: Orthopedics;  Laterality: Right;    CHOLECYSTECTOMY  08/27/2018    Dr. Petersen     knee and back surgery  1994    Back surgery 1994    KNEE SURGERY Right 06/2019    LAPAROSCOPIC GASTRIC BANDING  2014    Gastric sleeve    NECK SURGERY  2004    neck     REMOVAL OF BONE SPUR OF FOOT Right 2010    TOTAL KNEE ARTHROPLASTY Right 06/05/2019        Tobacco Use:  The patient  reports that she quit smoking about 18 years ago. Her smoking use included cigarettes. She has a 20.00 pack-year smoking history. She has never used smokeless tobacco.     Results for orders placed or performed during the hospital encounter of 10/12/22   EKG 12-lead    Collection Time: 10/12/22  1:41 PM    Narrative    Test Reason : Z01.818,    Vent. Rate : 088 BPM     Atrial Rate : 088 BPM     P-R Int : 156 ms          QRS Dur : 064 ms      QT Int : 366 ms       P-R-T Axes : 039 027 036 degrees     QTc Int : 442 ms    Normal sinus rhythm  Nonspecific ST abnormality  Abnormal ECG  When compared with ECG of 03-DEC-2019 11:06,  Vent. rate has increased BY  32 BPM    Referred By:             Confirmed By:              Lab Results   Component Value Date    WBC 7.37 10/12/2022    HGB 13.5 10/12/2022    HCT 42.9 10/12/2022    MCV 88 10/12/2022      10/12/2022     BMP  Lab Results   Component Value Date     10/12/2022    K 3.9 10/12/2022     10/12/2022    CO2 30 (H) 10/12/2022    BUN 15 10/12/2022    CREATININE 1.0 10/12/2022    CALCIUM 9.1 10/12/2022    ANIONGAP 6 (L) 10/12/2022    ESTGFRAFRICA >60.0 12/03/2019    EGFRNONAA >60.0 12/03/2019         Pre-op Assessment    I have reviewed the Patient Summary Reports.    I have reviewed the Nursing Notes.    I have reviewed the Medications.     Review of Systems  Anesthesia Hx:  No problems with previous Anesthesia  History of prior surgery of interest to airway management or planning: gastric bypass. Previous anesthesia: General Denies Family Hx of Anesthesia complications.   Denies Personal Hx of Anesthesia complications.   Social:  Former Smoker, No Alcohol Use    Hematology/Oncology:  Hematology Normal   Oncology Normal     EENT/Dental:EENT/Dental Normal   Cardiovascular:   Hypertension hyperlipidemia ECG has been reviewed.    Pulmonary:  Pulmonary Normal    Renal/:  Renal/ Normal     Hepatic/GI:  Hepatic/GI Normal    Musculoskeletal:   Arthritis  Patient denies radicular pain or neuropathy at this time Spine Disorders: lumbar    Neurological:   Neuromuscular Disease, Parkinson's Neuromuscular Disease Parkinson's   Endocrine:   Diabetes, well controlled, type 2 Pt. Without medicines at this time   Psych:   Psychiatric History Bipolar         Physical Exam  General:  Obesity      Airway/Jaw/Neck:  Airway Findings: Mouth Opening: Normal   Tongue: Normal   General Airway Assessment: Adult Mallampati: III  TM Distance: < 4 cm   Jaw/Neck Findings:  Neck ROM: Normal ROM       Dental:  Dental Findings: Upper Dentures, Lower Dentures     Chest/Lungs:  Chest/Lungs Findings: Clear to auscultation, Normal Respiratory Rate      Heart/Vascular:  Heart Findings: Rate: Normal  Rhythm: Regular Rhythm  Sounds: Normal  Heart murmur: negative Vascular Findings: Normal    Abdomen:  Abdomen Findings: Normal     Musculoskeletal:  Musculoskeletal Findings: Normal   Skin:  Skin Findings: Normal    Mental Status:  Mental Status Findings:  Cooperative, Alert and Oriented         Anesthesia Plan  Type of Anesthesia, risks & benefits discussed:  Anesthesia Type:  MAC    Patient's Preference: Carlitos Block  Plan Factors:          Intra-op Monitoring Plan: standard ASA monitors  Intra-op Monitoring Plan Comments:   Post Op Pain Control Plan:   Post Op Pain Control Plan Comments:     Induction:   IV  Beta Blocker:  Patient is not currently on a Beta-Blocker (No further documentation required).       Informed Consent: Informed consent signed with the Patient and all parties understand the risks and agree with anesthesia plan.  All questions answered.  Anesthesia consent signed with patient.  ASA Score: 3     Day of Surgery Review of History & Physical:        Anesthesia Plan Notes: POM mask        Ready For Surgery From Anesthesia Perspective.           Physical Exam  General: Obesity    Airway:  Mallampati: III   Mouth Opening: Normal  TM Distance: < 4 cm  Tongue: Normal  Neck ROM: Normal ROM    Dental:  Upper Dentures, Lower Dentures    Chest/Lungs:  Clear to auscultation, Normal Respiratory Rate    Heart:  Rate: Normal  Rhythm: Regular Rhythm  Sounds: Normal          Anesthesia Plan  Type of Anesthesia, risks & benefits discussed:    Anesthesia Type: MAC  Intra-op Monitoring Plan: standard ASA monitors  Induction:  IV  Informed Consent: Informed consent signed with the Patient and all parties understand the risks and agree with anesthesia plan.  All questions answered.   ASA Score: 3  Anesthesia Plan Notes: POM mask    Ready For Surgery From Anesthesia Perspective.       .

## 2023-03-30 NOTE — TELEPHONE ENCOUNTER
----- Message from Shawna Zhang sent at 12/4/2018 10:32 AM CST -----  Contact: pt  Pt states that she as seen today in the office and the a prescription was sent over to her pharmacy who told her the the insurance is having a problem with the medication that was called in.Pt would like the office to please call the pharmacy to see why cause they didn't say...[phone] (home)      No anesthetic complications

## 2024-01-25 ENCOUNTER — HOSPITAL ENCOUNTER (EMERGENCY)
Facility: HOSPITAL | Age: 54
Discharge: HOME | End: 2024-01-26
Attending: EMERGENCY MEDICINE
Payer: MEDICARE

## 2024-01-25 DIAGNOSIS — S60.221A CONTUSION OF RIGHT HAND, INITIAL ENCOUNTER: ICD-10-CM
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
## 2024-01-25 PROCEDURE — 99283 EMERGENCY DEPT VISIT LOW MDM: CPT

## 2024-01-26 VITALS
HEIGHT: 68 IN | BODY MASS INDEX: 25.76 KG/M2 | WEIGHT: 170 LBS | SYSTOLIC BLOOD PRESSURE: 134 MMHG | TEMPERATURE: 98 F | OXYGEN SATURATION: 97 % | DIASTOLIC BLOOD PRESSURE: 76 MMHG | HEART RATE: 78 BPM | RESPIRATION RATE: 16 BRPM

## 2024-01-26 NOTE — FIRST PROVIDER EVALUATION
Emergency Department TeleTriage Encounter Note      CHIEF COMPLAINT    Chief Complaint   Patient presents with    Fall     Yesterday fell walking up steps and fell catching self with right hand    Hand Injury     Right hand and wrist       VITAL SIGNS   Initial Vitals [01/25/24 2002]   BP Pulse Resp Temp SpO2   (!) 172/90 89 16 97.8 °F (36.6 °C) 96 %      MAP       --            ALLERGIES    Review of patient's allergies indicates:   Allergen Reactions    Pcn [penicillins] Hives    Morphine Hallucinations    Tizanidine Other (See Comments)     Mouth sores    Gabapentin Nausea Only       PROVIDER TRIAGE NOTE  53-year-old female isolated right hand injury from fall yesterday.  Bruising noted.      ORDERS  Labs Reviewed - No data to display    ED Orders (720h ago, onward)      None              Virtual Visit Note: The provider triage portion of this emergency department evaluation and documentation was performed via [employer], a HIPAA-compliant telemedicine application, in concert with a tele-presenter in the room. A face to face patient evaluation with one of my colleagues will occur once the patient is placed in an emergency department room.      DISCLAIMER: This note was prepared with [employer] voice recognition transcription software. Garbled syntax, mangled pronouns, and other bizarre constructions may be attributed to that software system.

## 2024-01-26 NOTE — ED NOTES
Pt tripped on steps and fell on right hand last night, right wrist pain 5/10 after norco 7.5 mg from home. Pt right hand bruised and swollen.

## 2024-01-26 NOTE — ED PROVIDER NOTES
"Encounter Date: 1/25/2024       History     Chief Complaint   Patient presents with    Fall     Yesterday fell walking up steps and fell catching self with right hand    Hand Injury     Right hand and wrist     This is a 53-year-old female presenting with right hand pain.  Pain began yesterday after a fall.  She denies any other injuries.        Review of patient's allergies indicates:   Allergen Reactions    Pcn [penicillins] Hives    Morphine Hallucinations    Tizanidine Other (See Comments)     Mouth sores    Gabapentin Nausea Only     Past Medical History:   Diagnosis Date    Bipolar 1 disorder 2011    Diabetes mellitus, type 2     pt states "no longer diabetic since I lost 100lbs"    Hyperlipidemia 2017    Hypertension     Knee injury 2011    Lumbar pain 1994    herniated disc     Parkinson's disease 2016    Thyroid disease 2014     Past Surgical History:   Procedure Laterality Date    ABLATION  1999    CARPAL TUNNEL RELEASE Right 12/11/2019    Procedure: RELEASE, CARPAL TUNNEL;  Surgeon: Alfredo Blanc MD;  Location: [facility] OR;  Service: Orthopedics;  Laterality: Right;    CHOLECYSTECTOMY  08/27/2018    Dr. Petersen     COLONOSCOPY N/A 10/13/2022    Procedure: COLONOSCOPY;  Surgeon: Kelechi Sheehan MD;  Location: [facility] ENDO;  Service: Endoscopy;  Laterality: N/A;    knee and back surgery  1994    Back surgery 1994    KNEE SURGERY Right 06/2019    LAPAROSCOPIC GASTRIC BANDING  2014    Gastric sleeve    NECK SURGERY  2004    neck     REMOVAL OF BONE SPUR OF FOOT Right 2010    TOTAL KNEE ARTHROPLASTY Right 06/05/2019     Family History   Problem Relation Age of Onset    Cancer Father     Diabetes Father     Hyperlipidemia Father     Cancer Paternal Aunt     Cancer Maternal Grandfather     Hyperlipidemia Maternal Grandfather     Hyperlipidemia Paternal Grandmother     Hyperlipidemia Paternal Grandfather      Social History     Tobacco Use    Smoking status: Former     Current packs/day: 0.00     Average packs/day: 1 " pack/day for 20.0 years (20.0 ttl pk-yrs)     Types: Cigarettes     Start date:      Quit date:      Years since quittin.0    Smokeless tobacco: Never   Substance Use Topics    Alcohol use: No    Drug use: No     Review of Systems   Musculoskeletal:  Positive for arthralgias.   All other systems reviewed and are negative.      Physical Exam     Initial Vitals [24]   BP Pulse Resp Temp SpO2   (!) 172/90 89 16 97.8 °F (36.6 °C) 96 %      MAP       --         Physical Exam    Nursing note and vitals reviewed.  Constitutional: She appears well-developed and well-nourished. She is not diaphoretic. No distress.   HENT:   Head: Normocephalic.   Eyes: Conjunctivae are normal.   Neck: Neck supple.   Normal range of motion.  Cardiovascular:  Normal rate.           Pulmonary/Chest: No respiratory distress.   Abdominal: She exhibits no distension.   Musculoskeletal:         General: Tenderness present. No edema.      Cervical back: Normal range of motion and neck supple.      Comments: Mild swelling/tenderness of the lateral ulnar aspect of the right hand.  Full range of motion intact.     Neurological: She is alert. She has normal strength.   Skin: Skin is warm and dry.   Psychiatric: She has a normal mood and affect.         ED Course   Procedures  Labs Reviewed - No data to display       Imaging Results              X-Ray Hand 3 view Right (In process)  Result time 24 20:59:04                     Medications - No data to display  Medical Decision Making  Acute traumatic right hand pain.  X-ray shows no obvious fracture.  Official read still pending.  Discharged home, advised OTC medications, ice, PCP follow up if her symptoms persisted.                                      Clinical Impression:  Final diagnoses:  [W19.XXXA] Fall, initial encounter (Primary)  [S60.221A] Contusion of right hand, initial encounter          ED Disposition Condition    Discharge Stable          ED Prescriptions     None       Follow-up Information       Follow up With Specialties Details Why Contact Info    Qasim Galindo MD Internal Medicine  As needed [address] [place] [address] Dr Candelario [address] [place] [id number]  [phone]               Nico Shah MD  01/26/24 0051

## 2024-07-01 ENCOUNTER — HOSPITAL ENCOUNTER (EMERGENCY)
Facility: HOSPITAL | Age: 54
Discharge: PSYCHIATRIC OR SUBSTANCE USE FACILITY | End: 2024-07-02
Attending: EMERGENCY MEDICINE
Payer: MEDICARE

## 2024-07-01 DIAGNOSIS — F30.9 MANIA: ICD-10-CM

## 2024-07-01 DIAGNOSIS — Z00.8 MEDICAL CLEARANCE FOR PSYCHIATRIC ADMISSION: Primary | ICD-10-CM

## 2024-07-01 LAB
ALBUMIN SERPL BCP-MCNC: 4.3 G/DL (ref 3.5–5.2)
ALP SERPL-CCNC: 128 U/L (ref 55–135)
ALT SERPL W/O P-5'-P-CCNC: 11 U/L (ref 10–44)
AMPHET+METHAMPHET UR QL: ABNORMAL
ANION GAP SERPL CALC-SCNC: 5 MMOL/L (ref 8–16)
APAP SERPL-MCNC: 4.5 UG/ML (ref 10–20)
AST SERPL-CCNC: 17 U/L (ref 10–40)
BARBITURATES UR QL SCN>200 NG/ML: NEGATIVE
BASOPHILS # BLD AUTO: 0.08 K/UL (ref 0–0.2)
BASOPHILS NFR BLD: 1.1 % (ref 0–1.9)
BENZODIAZ UR QL SCN>200 NG/ML: NEGATIVE
BILIRUB SERPL-MCNC: 0.5 MG/DL (ref 0.1–1)
BILIRUB UR QL STRIP: NEGATIVE
BUN SERPL-MCNC: 19 MG/DL (ref 6–20)
BZE UR QL SCN: NEGATIVE
CALCIUM SERPL-MCNC: 9.6 MG/DL (ref 8.7–10.5)
CANNABINOIDS UR QL SCN: ABNORMAL
CHLORIDE SERPL-SCNC: 106 MMOL/L (ref 95–110)
CLARITY UR: CLEAR
CO2 SERPL-SCNC: 28 MMOL/L (ref 23–29)
COLOR UR: COLORLESS
CREAT SERPL-MCNC: 1.1 MG/DL (ref 0.5–1.4)
CREAT UR-MCNC: 38.5 MG/DL (ref 15–325)
DIFFERENTIAL METHOD BLD: NORMAL
EOSINOPHIL # BLD AUTO: 0.2 K/UL (ref 0–0.5)
EOSINOPHIL NFR BLD: 2.2 % (ref 0–8)
ERYTHROCYTE [DISTWIDTH] IN BLOOD BY AUTOMATED COUNT: 13.6 % (ref 11.5–14.5)
EST. GFR  (NO RACE VARIABLE): 59 ML/MIN/1.73 M^2
ETHANOL SERPL-MCNC: <10 MG/DL
GLUCOSE SERPL-MCNC: 148 MG/DL (ref 70–110)
GLUCOSE UR QL STRIP: NEGATIVE
HCT VFR BLD AUTO: 46.7 % (ref 37–48.5)
HGB BLD-MCNC: 15.2 G/DL (ref 12–16)
HGB UR QL STRIP: ABNORMAL
IMM GRANULOCYTES # BLD AUTO: 0.01 K/UL (ref 0–0.04)
IMM GRANULOCYTES NFR BLD AUTO: 0.1 % (ref 0–0.5)
KETONES UR QL STRIP: NEGATIVE
LEUKOCYTE ESTERASE UR QL STRIP: NEGATIVE
LYMPHOCYTES # BLD AUTO: 2.1 K/UL (ref 1–4.8)
LYMPHOCYTES NFR BLD: 27.6 % (ref 18–48)
MCH RBC QN AUTO: 29.6 PG (ref 27–31)
MCHC RBC AUTO-ENTMCNC: 32.5 G/DL (ref 32–36)
MCV RBC AUTO: 91 FL (ref 82–98)
MICROSCOPIC COMMENT: NORMAL
MONOCYTES # BLD AUTO: 0.5 K/UL (ref 0.3–1)
MONOCYTES NFR BLD: 6.5 % (ref 4–15)
NEUTROPHILS # BLD AUTO: 4.7 K/UL (ref 1.8–7.7)
NEUTROPHILS NFR BLD: 62.5 % (ref 38–73)
NITRITE UR QL STRIP: NEGATIVE
NRBC BLD-RTO: 0 /100 WBC
OPIATES UR QL SCN: ABNORMAL
PCP UR QL SCN>25 NG/ML: NEGATIVE
PH UR STRIP: 6 [PH] (ref 5–8)
PLATELET # BLD AUTO: 233 K/UL (ref 150–450)
PMV BLD AUTO: 9.9 FL (ref 9.2–12.9)
POTASSIUM SERPL-SCNC: 3.9 MMOL/L (ref 3.5–5.1)
PROT SERPL-MCNC: 7.6 G/DL (ref 6–8.4)
PROT UR QL STRIP: NEGATIVE
RBC # BLD AUTO: 5.13 M/UL (ref 4–5.4)
RBC #/AREA URNS HPF: 0 /HPF (ref 0–4)
SALICYLATES SERPL-MCNC: <1.5 MG/DL (ref 15–30)
SODIUM SERPL-SCNC: 139 MMOL/L (ref 136–145)
SP GR UR STRIP: 1 (ref 1–1.03)
SQUAMOUS #/AREA URNS HPF: 0 /HPF
TOXICOLOGY INFORMATION: ABNORMAL
TSH SERPL DL<=0.005 MIU/L-ACNC: 2.02 UIU/ML (ref 0.34–5.6)
URN SPEC COLLECT METH UR: ABNORMAL
UROBILINOGEN UR STRIP-ACNC: NEGATIVE EU/DL
WBC # BLD AUTO: 7.44 K/UL (ref 3.9–12.7)
WBC #/AREA URNS HPF: 0 /HPF (ref 0–5)

## 2024-07-01 PROCEDURE — 80053 COMPREHEN METABOLIC PANEL: CPT | Performed by: EMERGENCY MEDICINE

## 2024-07-01 PROCEDURE — 25000003 PHARM REV CODE 250: Performed by: EMERGENCY MEDICINE

## 2024-07-01 PROCEDURE — 99285 EMERGENCY DEPT VISIT HI MDM: CPT | Mod: 25

## 2024-07-01 PROCEDURE — 80307 DRUG TEST PRSMV CHEM ANLYZR: CPT | Performed by: EMERGENCY MEDICINE

## 2024-07-01 PROCEDURE — 80143 DRUG ASSAY ACETAMINOPHEN: CPT | Performed by: EMERGENCY MEDICINE

## 2024-07-01 PROCEDURE — 96372 THER/PROPH/DIAG INJ SC/IM: CPT | Performed by: EMERGENCY MEDICINE

## 2024-07-01 PROCEDURE — 51701 INSERT BLADDER CATHETER: CPT

## 2024-07-01 PROCEDURE — 63600175 PHARM REV CODE 636 W HCPCS: Performed by: EMERGENCY MEDICINE

## 2024-07-01 PROCEDURE — 80179 DRUG ASSAY SALICYLATE: CPT | Performed by: EMERGENCY MEDICINE

## 2024-07-01 PROCEDURE — 85025 COMPLETE CBC W/AUTO DIFF WBC: CPT | Performed by: EMERGENCY MEDICINE

## 2024-07-01 PROCEDURE — 84443 ASSAY THYROID STIM HORMONE: CPT | Performed by: EMERGENCY MEDICINE

## 2024-07-01 PROCEDURE — 82077 ASSAY SPEC XCP UR&BREATH IA: CPT | Performed by: EMERGENCY MEDICINE

## 2024-07-01 PROCEDURE — 81001 URINALYSIS AUTO W/SCOPE: CPT | Performed by: EMERGENCY MEDICINE

## 2024-07-01 PROCEDURE — G0426 INPT/ED TELECONSULT50: HCPCS | Mod: 95,,, | Performed by: PSYCHIATRY & NEUROLOGY

## 2024-07-01 RX ORDER — DIAZEPAM 5 MG/1
5 TABLET ORAL
Status: COMPLETED | OUTPATIENT
Start: 2024-07-01 | End: 2024-07-01

## 2024-07-01 RX ORDER — DIPHENHYDRAMINE HYDROCHLORIDE 50 MG/ML
50 INJECTION INTRAMUSCULAR; INTRAVENOUS
Status: COMPLETED | OUTPATIENT
Start: 2024-07-01 | End: 2024-07-01

## 2024-07-01 RX ORDER — HALOPERIDOL 5 MG/ML
5 INJECTION INTRAMUSCULAR
Status: COMPLETED | OUTPATIENT
Start: 2024-07-01 | End: 2024-07-01

## 2024-07-01 RX ORDER — LORAZEPAM 2 MG/ML
2 INJECTION INTRAMUSCULAR
Status: COMPLETED | OUTPATIENT
Start: 2024-07-01 | End: 2024-07-01

## 2024-07-01 RX ADMIN — HALOPERIDOL LACTATE 5 MG: 5 INJECTION, SOLUTION INTRAMUSCULAR at 07:07

## 2024-07-01 RX ADMIN — LORAZEPAM 2 MG: 2 INJECTION INTRAMUSCULAR; INTRAVENOUS at 07:07

## 2024-07-01 RX ADMIN — DIAZEPAM 5 MG: 5 TABLET ORAL at 02:07

## 2024-07-01 RX ADMIN — DIPHENHYDRAMINE HYDROCHLORIDE 50 MG: 50 INJECTION INTRAMUSCULAR; INTRAVENOUS at 07:07

## 2024-07-01 NOTE — CONSULTS
"[facility]  Psychiatry  Telepsychiatry Consult Note    Please see previous notes:    Patient agreeable to consultation via telepsychiatry.    Tele-Consultation from Psychiatry started: 7/1/2024 at 12:41 PM  The chief complaint leading to psychiatric consultation is: "manic"  This consultation was requested by Dr Suresh, the Emergency Department attending physician.  The location of the consulting psychiatrist is [place].  The patient location is  [facility] EMERGENCY DEPARTMENT   The patient arrived at the ED at: 7/1/2024 11:38 AM    Also present with the patient at the time of the consultation: none    Patient Identification:   Sabrina Black is a 54 y.o. female.    Patient information was obtained from patient, past medical records, and ER records.  Patient presented voluntarily to the Emergency Department by private vehicle.    IP consult to Telemedicine - Psych  Consult performed by: Bonnie Mittal MD  Consult ordered by: Abhi Suresh MD        Teleconsult Time Documentation  Subjective:     History of Present Illness:    Chief Complaint   Patient presents with    Mental Health Problem     States feels " Manic" reports stopped taking her antidepressant because she states " the medication is not working" and reports Psychiatrist will not change her meds because she is on opoid     Pt is a 53 yo female with PMH as below and past psychiatric hx bipolar 1 per chart BIB self as above. Chart reviewed, no labs or notes yet available for review, discussed pt with Dr Suresh prior to initiating interview. On interview she states "I cannot find a psychiatrist who will prescribe me medication to calm myself...Asked for kleenex and socks, nobody is giving respect for people." Continues "Klonopin...When I got manic like this I could take it and I'd be fine." Discusses contacting her psychiatrists office multiple times and getting the response "You need to be committed blah blah blah." States "I'm so " "angry I can't stand it I'm fighting with my mind...Nobody cares about nothing...I cannot calm down...Everytime I go to  something I drop it on the floor...People rip me off...Its constant constant constant constant." Upset regarding ED treatment stating "This is stupid...here watching some stupid ass show....[place]...for 45 minutes." Says she previously was in [place] "I was hallucinating and all kinds of crap they got me level and I was good." "My mind is mad at me because I don't ever appreciate a damn thing," becomes tearful, stating "I am just absolutely miserable, I am stuck wiping somebody's ass, it's all been lies since I came in here." Discussed plan to administer ordered Valium, states "I probably will have a reverse reaction one time before I illegally took a valium, I didn't sleep for 3 days and all I did was cry...My mouth is so dry." Despite multiple attempts, the comprehensive psychiatric assessment was limited due to pts acute grisel.    Collateral:  Zahra Black Mother   [phone]     Psychiatric History:   Previous Psychiatric Hospitalizations: unable to assess  Previous Medication Trials: Yes Prozac, Buspar, Lexapro, Trazodone, Vistaril  Previous Suicide Attempts: unable to assess  History of Violence: unable to assess  History of Depression: unable to assess  History of Grisel: unable to assess  History of Auditory/Visual Hallucination yes  History of Delusions: unable to assess  Outpatient psychiatrist (current & past): yes    Substance Abuse History:  Tobacco:unable to assess  Alcohol: unable to assess  Illicit Substances:unable to assess  Detox/Rehab: unable to assess    Legal History: Past charges/incarcerations: unable to assess     Family Psychiatric History: unable to assess      Social History:  Developmental/Childhood:unable to assess  *Education:unable to assess  Employment Status/Finances:employed  Relationship Status/Sexual Orientation: unable to assess  Children: unable " "to assess  Housing Status: unable to assess    history:  unable to assess  Access to gun: unable to assess  Buddhist:unable to assess  Recreational activities:unable to assess    Psychiatric Mental Status Exam:  Arousal: alert  Sensorium/Orientation: oriented to grossly intact  Behavior/Cooperation: psychomotor agitation, restless and fidgety    Speech: loud, pressured, profane  Language: grossly intact  Mood: " angry "   Affect: labile  Thought Process: tangential  Thought Content:   Auditory hallucinations:  unable to assess  Visual hallucinations:  unable to assess  Paranoia:  unable to assess  Delusions:   unable to assess  Suicidal ideation:  unable to assess  Homicidal ideation:  unable to assess  Attention/Concentration:  Easily distracted  Memory:  unable to assess  Fund of Knowledge:  unable to assess  Abstract reasoning:  unable to assess  Insight: limited awareness of illness  Judgment: limited      Past Medical History:   Past Medical History:   Diagnosis Date    Bipolar 1 disorder 2011    Diabetes mellitus, type 2     pt states "no longer diabetic since I lost 100lbs"    Hyperlipidemia 2017    Hypertension     Knee injury 2011    Lumbar pain 1994    herniated disc     Parkinson's disease 2016    Thyroid disease 2014      Laboratory Data:   Labs Reviewed   CBC W/ AUTO DIFFERENTIAL   COMPREHENSIVE METABOLIC PANEL   URINALYSIS, REFLEX TO URINE CULTURE   DRUG SCREEN PANEL, URINE EMERGENCY   ALCOHOL,MEDICAL (ETHANOL)   ACETAMINOPHEN LEVEL   SALICYLATE LEVEL   TSH       Neurological History:  Seizures:  unable to assess  Head trauma:  unable to assess    Allergies:  unable to assess  Review of patient's allergies indicates:   Allergen Reactions    Pcn [penicillins] Hives    Morphine Hallucinations    Tizanidine Other (See Comments)     Mouth sores    Gabapentin Nausea Only       Medications in ER:   Medications   diazePAM tablet 5 mg (has no administration in time range)       Medications at home:  " unable to assess    Per [place] :  Prescriptions  Total: 31  Private Pay: 0  Showing 1-15 of 31 Items View 15 Items   of 3   Filled  Written  ID  Drug  QTY  Days  Prescriber  RX #  Dispenser  Refill  Daily Dose*  Pymt Type      04/24/2024 03/25/2024 1 Hydrocodone-Acetamin 7.5-325 60.00 30 Er Efrain [id number] [facility] (4049) 0 15.00 MME [place]   03/25/2024 03/25/2024 1 Hydrocodone-Acetamin 7.5-325 60.00 30 Er Efrain [id number] [facility] (4049) 0 15.00 MME [place]   03/04/2024 12/15/2023 1 Pregabalin 75 Mg Capsule 90.00 30 Dunn Cosmo [id number] [facility] (8651) 1 1.51 LME [place]   01/23/2024 12/15/2023 1 Hydrocodone-Acetamin 7.5-325 60.00 30 Dunn Cosmo [id number] [facility] (8651) 0 15.00 MME [place]   12/27/2023 12/15/2023 1 Hydrocodone-Acetamin 7.5-325 60.00 30 Dunn Cosmo [id number] [facility] (8651) 0 15.00 MME [place]   12/27/2023 12/15/2023 1 Pregabalin 75 Mg Capsule 90.00 30 Dunn Cosmo [id number] [facility] (8651) 0 1.51 LME [place]   10/22/2023 10/22/2023 1 Hydrocodone-Acetamin 7.5-325 60.00 30 Dunn Cosmo [id number] [facility] (4049) 0 15.00 MME [place]   09/24/2023 09/01/2023 1 Hydrocodone-Acetamin 7.5-325 60.00 30 Dunn Cosmo [id number] [facility] (8651) 0 15.00 MME [place]   09/21/2023 09/01/2023 1 Pregabalin 75 Mg Capsule 90.00 30 Dunn Cosmo [id number] [facility] (8651) 0 1.51 LME [place]   08/26/2023 07/24/2023 1 Hydrocodone-Acetamin 7.5-325 60.00 30 Er Efrain [id number] [facility] (8651) 0 15.00 MME [place]   07/26/2023 07/24/2023 1 Hydrocodone-Acetamin 7.5-325 60.00 30 Er Efrain [id number] [facility] (2761) 0 15.00 [place]ME [place]   07/24/2023 07/24/2023 1 Pregabalin 75 Mg Capsule 90.00 30 Er Efrain [id number] [facility] (3170) 0 1.51 LME [place]   06/28/2023 05/22/2023 1 Hydrocodone-Acetamin 7.5-325 60.00 30 Er Efrain [id number] [facility] (5031) 0 15.00 MME [place]   06/26/2023 06/26/2023 1 Hydrocodone-Acetamin  Mg 8.00 2 Ia Shekhar [id number] [facility] (7768) 0 40.00 MME [place]   05/30/2023 05/22/2023 1 Hydrocodone-Acetamin 7.5-325 60.00 30 Er Efrain [id number] [facility] (3569) 0 15.00 MME [place]       Assessment - Diagnosis -  Goals:     IMPRESSION:   Bipolar 1 d/o, currently manic    RECOMMENDATIONS:     DISPOSITION: Once medically cleared;   Seek Involuntary Inpatient Psychiatric admission for stabilization of acute psychiatric symptoms and until a safe disposition plan is enacted.      PSYCHIATRIC MEDICATIONS  Scheduled-defer to inpatient psychiatry   PRN-Haldol 5mg + Benadryl 50mg + Ativan 2mg PO/IM q 6 for psychotic agitation. Vistaril 25 mg q6 PRN anxiety/insomnia.    LEGAL  Seek/continue PEC because pt is gravely disabled. Please provide with 1:1 sitter.     OTHER  Obtain collateral  Recommend EKG to check Qtc if not already done      Total time including chart review, time with patient, obtaining collateral info[if necessary/possible]: 51        More than 50% of the time was spent counseling/coordinating care    Consulting clinician was informed of the encounter and consult note.    Consultation ended: 7/1/2024 at 1:32 PM      Bonnie Mittal MD   Psychiatry  [facility]

## 2024-07-01 NOTE — ED PROVIDER NOTES
"Encounter Date: 7/1/2024       History     Chief Complaint   Patient presents with    Mental Health Problem     States feels " Manic" reports stopped taking her antidepressant because she states " the medication is not working" and reports Psychiatrist will not change her meds because she is on opoid     Patient reports occasional manic episodes.  No definite diagnosis of bipolar disorder.  Patient reports she has been very angry at everybody.  Difficulty getting in touch with her psychiatrist.  She feels like ripping pupils head off when they make her angry.  She is unable to perform activities of daily living.  She lives at home.  Symptoms worsened after death of her CT last week.      Review of patient's allergies indicates:   Allergen Reactions    Pcn [penicillins] Hives    Morphine Hallucinations    Tizanidine Other (See Comments)     Mouth sores    Gabapentin Nausea Only     Past Medical History:   Diagnosis Date    Bipolar 1 disorder 2011    Diabetes mellitus, type 2     pt states "no longer diabetic since I lost 100lbs"    Hyperlipidemia 2017    Hypertension     Knee injury 2011    Lumbar pain 1994    herniated disc     Parkinson's disease 2016    Thyroid disease 2014     Past Surgical History:   Procedure Laterality Date    ABLATION  1999    CARPAL TUNNEL RELEASE Right 12/11/2019    Procedure: RELEASE, CARPAL TUNNEL;  Surgeon: Alfredo Blanc MD;  Location: [facility] OR;  Service: Orthopedics;  Laterality: Right;    CHOLECYSTECTOMY  08/27/2018    Dr. Petersen     COLONOSCOPY N/A 10/13/2022    Procedure: COLONOSCOPY;  Surgeon: Kelechi Sheehan MD;  Location: [facility] ENDO;  Service: Endoscopy;  Laterality: N/A;    knee and back surgery  1994    Back surgery 1994    KNEE SURGERY Right 06/2019    LAPAROSCOPIC GASTRIC BANDING  2014    Gastric sleeve    NECK SURGERY  2004    neck     REMOVAL OF BONE SPUR OF FOOT Right 2010    TOTAL KNEE ARTHROPLASTY Right 06/05/2019     Family History   Problem Relation Name Age of Onset    " Cancer Father      Diabetes Father      Hyperlipidemia Father      Cancer Paternal Aunt      Cancer Maternal Grandfather      Hyperlipidemia Maternal Grandfather      Hyperlipidemia Paternal Grandmother      Hyperlipidemia Paternal Grandfather       Social History     Tobacco Use    Smoking status: Former     Current packs/day: 0.00     Average packs/day: 1 pack/day for 20.0 years (20.0 ttl pk-yrs)     Types: Cigarettes     Start date:      Quit date:      Years since quittin.5    Smokeless tobacco: Never   Substance Use Topics    Alcohol use: No    Drug use: No     Review of Systems   Constitutional:  Negative for chills and fever.   HENT:  Negative for congestion.    Respiratory:  Negative for shortness of breath.    Cardiovascular:  Negative for chest pain and palpitations.   Gastrointestinal:  Negative for abdominal pain and vomiting.   Genitourinary:  Negative for dysuria.   Musculoskeletal:  Negative for joint swelling.   Neurological:  Negative for headaches.   Psychiatric/Behavioral:  Negative for confusion.        Physical Exam     Initial Vitals [24 1141]   BP Pulse Resp Temp SpO2   (!) 163/126 102 18 98.3 °F (36.8 °C) 99 %      MAP       --         Physical Exam    Nursing note and vitals reviewed.  Constitutional: She is not diaphoretic. No distress.   HENT:   Head: Normocephalic and atraumatic.   Eyes: Conjunctivae are normal.   Neck:   Normal range of motion.  Cardiovascular:  Normal rate.           Pulmonary/Chest: Breath sounds normal.   Abdominal: Abdomen is soft. There is no abdominal tenderness.   Musculoskeletal:         General: Normal range of motion.      Cervical back: Normal range of motion.     Neurological: She is oriented to person, place, and time. She has normal strength. No cranial nerve deficit or sensory deficit.   No gross deficits   Skin: No rash noted.   Psychiatric:   Patient is anxious and agitated.  She is easily upset and screaming at staff.  She is able to  be redirected.  No delusions or hallucinations.         ED Course   Procedures  Labs Reviewed   COMPREHENSIVE METABOLIC PANEL - Abnormal; Notable for the following components:       Result Value    Glucose 148 (*)     eGFR 59.0 (*)     Anion Gap 5 (*)     All other components within normal limits   URINALYSIS, REFLEX TO URINE CULTURE - Abnormal; Notable for the following components:    Color, UA Colorless (*)     Occult Blood UA 1+ (*)     All other components within normal limits    Narrative:     Specimen Source->Urine   DRUG SCREEN PANEL, URINE EMERGENCY - Abnormal; Notable for the following components:    Opiate Scrn, Ur Presumptive Positive (*)     Amphetamine Screen, Ur Presumptive Positive (*)     THC Presumptive Positive (*)     All other components within normal limits    Narrative:     Specimen Source->Urine   ACETAMINOPHEN LEVEL - Abnormal; Notable for the following components:    Acetaminophen (Tylenol), Serum 4.5 (*)     All other components within normal limits   SALICYLATE LEVEL - Abnormal; Notable for the following components:    Salicylate Lvl <1.5 (*)     All other components within normal limits   CBC W/ AUTO DIFFERENTIAL   ALCOHOL,MEDICAL (ETHANOL)   TSH   URINALYSIS MICROSCOPIC    Narrative:     Specimen Source->Urine          Imaging Results    None          Medications   diazePAM tablet 5 mg (5 mg Oral Given 7/1/24 1417)   diphenhydrAMINE injection 50 mg (50 mg Intramuscular Given 7/1/24 1938)   haloperidol lactate injection 5 mg (5 mg Intramuscular Given 7/1/24 1938)   LORazepam injection 2 mg (2 mg Intramuscular Given 7/1/24 1939)     Medical Decision Making  Patient presents with acute angel.  Patient reports possible bipolar disorder in the past.  CBC CMP unremarkable.  Patient continues to be agitated and acutely manic.  Patient is gravely disabled.  Dr. Mittal with tele psychiatry agrees with physician emergency certificate.  Patient is medically clear for psychiatric transport.  Will  continue benzodiazepines for now.    Amount and/or Complexity of Data Reviewed  Labs: ordered.    Risk  Prescription drug management.               ED Course as of 07/02/24 0618   Mon Jul 01, 2024   1932 Patient continues to bang on stretcher, yell, she understands for several hours that she needed to give a urine specimen she requested several glasses of tea which was provided for her she was also given food patient was noted on screened to be urinating on the floor.  Refusing to give urine, patient became verbally abusive to staff.  B52 ordered , urine cath will be obtained [MP]   2245 Patient was sleeping however easily arousable, curses when she is awakened has a saturation of 99%  on room air even when  sleeping [MP]      ED Course User Index  [MP] Em Corrales FNP       Medically cleared for psychiatry placement: 7/1/2024  2:18 PM                   Clinical Impression:  Final diagnoses:  [Z00.8] Medical clearance for psychiatric admission (Primary)  [F30.9] Grisel          ED Disposition Condition    Transfer to Psych Facility Stable          ED Prescriptions    None       Follow-up Information    None          Abhi Suresh MD  07/01/24 1419       Abhi Suresh MD  07/02/24 0618

## 2024-07-01 NOTE — ED NOTES
Pt lying in bed, RR even and unlabored. NAD. Sitter at monitor. Call light in reach. Safety needs met.

## 2024-07-01 NOTE — ED NOTES
"Pt lying in bed, RR even and unlabored. Pt agitated but cooperative while attaining vital signs. NAD. Sitter at monitor. Call light in reach. Safety needs met. Pt reminded of need for UA. Pt stated she did not have to urinate. Pt given "sweet tea with sweet & low" per request in order to provide a urine sample. Pt wrapped up dinner tray in sheet and refused to eat.   "

## 2024-07-01 NOTE — ED NOTES
Answered call light. Pt pacing in room and cursing at staff. Pt verbally redirected. Sitter at monitor. Call light in reach. Safety needs met.

## 2024-07-02 VITALS
OXYGEN SATURATION: 97 % | WEIGHT: 160 LBS | HEART RATE: 66 BPM | DIASTOLIC BLOOD PRESSURE: 82 MMHG | TEMPERATURE: 98 F | BODY MASS INDEX: 24.33 KG/M2 | RESPIRATION RATE: 15 BRPM | SYSTOLIC BLOOD PRESSURE: 128 MMHG

## 2024-07-02 PROBLEM — F12.10 CANNABIS USE DISORDER, MILD, ABUSE: Status: ACTIVE | Noted: 2024-07-02

## 2024-07-02 PROBLEM — F15.20 AMPHETAMINE USE DISORDER, MODERATE: Status: ACTIVE | Noted: 2024-07-02

## 2024-07-02 PROBLEM — F11.20 OPIOID USE DISORDER, MODERATE, DEPENDENCE: Status: ACTIVE | Noted: 2024-07-02

## 2024-07-02 PROBLEM — F43.25 ADJUSTMENT DISORDER WITH MIXED DISTURBANCE OF EMOTIONS AND CONDUCT: Status: ACTIVE | Noted: 2024-07-02

## 2024-07-02 NOTE — ED NOTES
Pt changing into paper scrubs. [facility] arrived for transport. NAD. Sitter at monitor. Call light in reach. Safety needs met.

## 2024-07-02 NOTE — ED NOTES
"Pt urinated on the floor. Pt was reminded that staff asked repeatedly if she could provide a urine sample or needed to urinate. Pt stated "I don't care." Pt increasingly irritable and verbally aggressive. Pt refusing further PO medication. Pt unable to be verbally redirected. NP at bedside. Sitter at monitor. Safety needs met.   "

## 2024-07-02 NOTE — ED NOTES
"Pt informed she will need to provide a urine sample in a sterile cup in the bathroom or undergo an in and out catheter. Pt refusing to provide urine sample. Pt states "Well, I don't have to pee now." Sitter at monitor. Call light in reach. Safety needs met. Pt placed on vitals monitoring machine s/p IM medications.   "

## 2024-07-02 NOTE — ED NOTES
"Pt lying in bed, RR even and unlabored. Pt arouses to voice. Pt states "leave me alone" while trying to assess pt. VSS. Pt easily irritable and angry. Sitter at monitor. Call light in reach. Safety needs met.   "

## 2024-07-02 NOTE — ED NOTES
Pt still refusing to obtain a urine sample. Straight cath performed, procedure explained to pt. Pt irritable and hostile but allowed straight cath to occur. Sitter at monitor, call light in reach. Safety needs met.

## 2024-07-04 PROBLEM — F30.9 MANIA: Status: ACTIVE | Noted: 2024-07-04

## 2024-07-09 ENCOUNTER — TELEPHONE (OUTPATIENT)
Dept: OPHTHALMOLOGY | Facility: CLINIC | Age: 54
End: 2024-07-09
Payer: MEDICARE

## 2024-07-09 NOTE — TELEPHONE ENCOUNTER
Returned call- pt states she needed to call back can't talk    ----- Message from Sarai Chris sent at 7/8/2024  7:48 PM CDT -----  Type:  Patient Returning Call    Who Called: Pt  Who Left Message for Patient:  Does the patient know what this is regarding?: appt  Would the patient rather a call back or a response via [facility]? Call  Best Call Back Number: [phone]  Additional Information:  Pt would like to speak to someone in office related to appt

## 2024-07-15 ENCOUNTER — OFFICE VISIT (OUTPATIENT)
Dept: SPINE | Facility: CLINIC | Age: 54
End: 2024-07-15
Payer: MEDICARE

## 2024-07-15 ENCOUNTER — TELEPHONE (OUTPATIENT)
Dept: PSYCHIATRY | Facility: CLINIC | Age: 54
End: 2024-07-15
Payer: MEDICARE

## 2024-07-15 VITALS — WEIGHT: 160.06 LBS | BODY MASS INDEX: 24.26 KG/M2 | HEIGHT: 68 IN

## 2024-07-15 DIAGNOSIS — M54.9 DORSALGIA, UNSPECIFIED: ICD-10-CM

## 2024-07-15 DIAGNOSIS — M54.50 CHRONIC BILATERAL LOW BACK PAIN WITHOUT SCIATICA: Primary | ICD-10-CM

## 2024-07-15 DIAGNOSIS — G89.29 CHRONIC BILATERAL LOW BACK PAIN WITHOUT SCIATICA: Primary | ICD-10-CM

## 2024-07-15 PROCEDURE — 99999 PR PBB SHADOW E&M-EST. PATIENT-LVL III: CPT | Mod: PBBFAC,,, | Performed by: PHYSICAL MEDICINE & REHABILITATION

## 2024-07-15 PROCEDURE — 1160F RVW MEDS BY RX/DR IN RCRD: CPT | Mod: CPTII,S$GLB,, | Performed by: PHYSICAL MEDICINE & REHABILITATION

## 2024-07-15 PROCEDURE — 3008F BODY MASS INDEX DOCD: CPT | Mod: CPTII,S$GLB,, | Performed by: PHYSICAL MEDICINE & REHABILITATION

## 2024-07-15 PROCEDURE — 99204 OFFICE O/P NEW MOD 45 MIN: CPT | Mod: S$GLB,,, | Performed by: PHYSICAL MEDICINE & REHABILITATION

## 2024-07-15 PROCEDURE — 1111F DSCHRG MED/CURRENT MED MERGE: CPT | Mod: CPTII,S$GLB,, | Performed by: PHYSICAL MEDICINE & REHABILITATION

## 2024-07-15 PROCEDURE — 1159F MED LIST DOCD IN RCRD: CPT | Mod: CPTII,S$GLB,, | Performed by: PHYSICAL MEDICINE & REHABILITATION

## 2024-07-15 NOTE — PROGRESS NOTES
"  SUBJECTIVE:    Patient ID: Sabrina Black is a 54 y.o. female.    Chief Complaint: Back Pain and Low-back Pain    This is a 54-year-old woman who sees Dr. Galindo for her primary care.  History of hyperlipidemia hypertension and hypothyroidism otherwise denies any chronic major medical problems.  No cancer history.  History of ACDF C5 through C7.  She has had an unspecified low back surgery in 1994.  She had a spinal cord stimulator in the past which was explanted on 10/26/2023.  She says it was ineffective.  Presents to me with a primary complaint of low back pain at the lumbosacral junction without radicular symptoms.  Bowel and bladder are intact.  No fever chills sweats or unexpected weight loss.  She describes numbness and tingling from the elbows to digits 4 and 5 of both hands.  She has numbness and tingling in the left leg below the knee.  She says she had EMG and nerve conduction studies done with Dr. Ronald Wilkerson, neurology a couple of months ago but she does not know the results.  I note that she has been seen by Dr. Leslye Munoz, physical medicine and rehabilitation and Dr. Philipp landry and R/pain management at [facility], both within the past year.  She was being prescribed hydrocodone at 1 point.  Her last prescription for Norco 7.5 mg was 04/24/2024 from Dr. Soriano.  I reviewed an x-ray report of the lumbar spine done August of 2020 which describes severe degenerative disc height loss at L4-5 and neural foraminal stenosis on the right at L5-S1.  I do not have any films to review.  Her current pain level is 4/10 but at times as high as 10/10 and interferes with quality of life in terms of activities of daily living recreation and social activities.          Past Medical History:   Diagnosis Date    Bipolar 1 disorder 2011    Diabetes mellitus, type 2     pt states "no longer diabetic since I lost 100lbs"    Hyperlipidemia 2017    Hypertension     Knee injury 2011    Lumbar pain " "    herniated disc     Parkinson's disease 2016    Thyroid disease      Social History     Socioeconomic History    Marital status:    Tobacco Use    Smoking status: Former     Current packs/day: 0.00     Average packs/day: 1 pack/day for 20.0 years (20.0 ttl pk-yrs)     Types: Cigarettes     Start date:      Quit date:      Years since quittin.5    Smokeless tobacco: Never   Substance and Sexual Activity    Alcohol use: No    Drug use: No    Sexual activity: Not Currently     Social Determinants of Health      Received from [facility], [facility]    Housing Stability     Past Surgical History:   Procedure Laterality Date    ABLATION  1999    CARPAL TUNNEL RELEASE Right 2019    Procedure: RELEASE, CARPAL TUNNEL;  Surgeon: Alfredo Blanc MD;  Location: [facility] OR;  Service: Orthopedics;  Laterality: Right;    CHOLECYSTECTOMY  2018    Dr. Petersen     COLONOSCOPY N/A 10/13/2022    Procedure: COLONOSCOPY;  Surgeon: Kelechi Sheehan MD;  Location: [facility] ENDO;  Service: Endoscopy;  Laterality: N/A;    knee and back surgery      Back surgery     KNEE SURGERY Right 2019    LAPAROSCOPIC GASTRIC BANDING  2014    Gastric sleeve    NECK SURGERY  2004    neck     REMOVAL OF BONE SPUR OF FOOT Right 2010    TOTAL KNEE ARTHROPLASTY Right 2019     Family History   Problem Relation Name Age of Onset    Cancer Father      Diabetes Father      Hyperlipidemia Father      Cancer Paternal Aunt      Cancer Maternal Grandfather      Hyperlipidemia Maternal Grandfather      Hyperlipidemia Paternal Grandmother      Hyperlipidemia Paternal Grandfather       Vitals:    07/15/24 1300   Weight: 72.6 kg (160 lb 0.9 oz)   Height: 5' 8" (1.727 m)       Review of Systems   Constitutional:  Negative for chills, diaphoresis, fatigue, fever and unexpected weight change.   HENT:  Negative for trouble swallowing.    Eyes:  Negative for visual disturbance.   Respiratory:  " Negative for shortness of breath.    Cardiovascular:  Negative for chest pain.   Gastrointestinal:  Negative for abdominal pain, constipation, nausea and vomiting.   Genitourinary:  Negative for difficulty urinating.   Musculoskeletal:  Negative for arthralgias, back pain, gait problem, joint swelling, myalgias, neck pain and neck stiffness.   Neurological:  Negative for dizziness, speech difficulty, weakness, light-headedness, numbness and headaches.          Objective:      Physical Exam  Neurological:      Mental Status: She is alert and oriented to person, place, and time.      Comments: She is awake and in no acute distress  No point tenderness or palpable masses about the lumbar spine  Forward flexion of the lumbar spine is to about 60° before complaint pain at the lumbosacral junction.  Extension at 10° causes pain at the lumbosacral junction  She can heel and toe walk normally  Reflexes- +1-+2 reflexes at the following:   C5-Biceps   C6-Brachioradialis   C7-Triceps   L3/4-Patellar   S1-Achilles   Jaquan sign is negative bilaterally  Strength testing- 5/5 strength in the following muscle groups:  C5-Elbow flexion  C6-Wrist extension  C7-Elbow extension  C8-Finger flexion  T1-Finger abduction  L2-Hip flexion  L3-Knee extension  L4-Ankle dorsiflexion  L5-Great toe extension  S1-Ankle plantar flexion                    Assessment:       1. Chronic bilateral low back pain without sciatica    2. Dorsalgia, unspecified           Plan:     She has a nonfocal neurological examination and no historical red flags.  I suspect she has low back pain on basis of degenerative disc disease and facet arthropathy.  She has pain with facet loading.  We talked about radiofrequency ablation.  She says she has had that in the past and it was not effective.  Treatment options are limited.  I recommend an updated MRI with and without contrast.  Start physical therapy.  We will try to get her EMG/nerve conduction records.  She can  follow up with me after the scan      Chronic bilateral low back pain without sciatica  -     Ambulatory referral/consult to Physical/Occupational Therapy; Future; Expected date: 07/22/2024    Dorsalgia, unspecified  -     MRI Lumbar Spine W WO Cont; Future; Expected date: 07/15/2024

## 2024-07-15 NOTE — TELEPHONE ENCOUNTER
Pt called requesting to schedule an appt. Advised her that we require a referral from an [facility] PCP. Explained that we have an extensive wait list and it may be 6+ months to be scheduled for the initial visit. She states she can't wait that long and hung up.

## 2024-07-23 ENCOUNTER — TELEPHONE (OUTPATIENT)
Dept: PSYCHIATRY | Facility: CLINIC | Age: 54
End: 2024-07-23
Payer: MEDICARE

## 2024-07-23 NOTE — TELEPHONE ENCOUNTER
Pt called requesting to schedule appt. Advised her that we require a referral from an [facility] PCP. Explained that we have an extensive wait list. Pt does not have an [facility] PCP. She says she will schedule an appt with an [facility] PCP just to get a referral and then go back to seeing Dr. Galindo. Advised pt to contact her current PCP for recommendations on psych providers. She verbalized understanding.

## 2024-07-25 RX ORDER — DIAZEPAM 5 MG/1
TABLET ORAL
Qty: 2 TABLET | Refills: 0 | Status: SHIPPED | OUTPATIENT
Start: 2024-07-25

## 2024-07-27 ENCOUNTER — HOSPITAL ENCOUNTER (OUTPATIENT)
Dept: RADIOLOGY | Facility: HOSPITAL | Age: 54
Discharge: HOME | End: 2024-07-27
Attending: PHYSICAL MEDICINE & REHABILITATION
Payer: MEDICARE

## 2024-07-27 DIAGNOSIS — M54.9 DORSALGIA, UNSPECIFIED: ICD-10-CM

## 2024-07-27 PROCEDURE — 25500020 PHARM REV CODE 255

## 2024-07-27 PROCEDURE — 72158 MRI LUMBAR SPINE W/O & W/DYE: CPT | Mod: TC

## 2024-07-27 PROCEDURE — 72158 MRI LUMBAR SPINE W/O & W/DYE: CPT | Mod: 26,,, | Performed by: RADIOLOGY

## 2024-07-27 PROCEDURE — A9585 GADOBUTROL INJECTION: HCPCS

## 2024-07-27 RX ORDER — GADOBUTROL 604.72 MG/ML
INJECTION INTRAVENOUS
Status: COMPLETED
Start: 2024-07-27 | End: 2024-07-27

## 2024-07-27 RX ADMIN — GADOBUTROL 7 ML: 604.72 INJECTION INTRAVENOUS at 08:07

## 2024-07-30 ENCOUNTER — OFFICE VISIT (OUTPATIENT)
Dept: SPINE | Facility: CLINIC | Age: 54
End: 2024-07-30
Payer: MEDICARE

## 2024-07-30 VITALS — WEIGHT: 160.06 LBS | BODY MASS INDEX: 24.26 KG/M2 | HEIGHT: 68 IN

## 2024-07-30 DIAGNOSIS — G89.29 CHRONIC BILATERAL LOW BACK PAIN WITHOUT SCIATICA: Primary | ICD-10-CM

## 2024-07-30 DIAGNOSIS — M54.50 CHRONIC BILATERAL LOW BACK PAIN WITHOUT SCIATICA: Primary | ICD-10-CM

## 2024-07-30 PROCEDURE — 1111F DSCHRG MED/CURRENT MED MERGE: CPT | Mod: CPTII,S$GLB,, | Performed by: PHYSICAL MEDICINE & REHABILITATION

## 2024-07-30 PROCEDURE — 1160F RVW MEDS BY RX/DR IN RCRD: CPT | Mod: CPTII,S$GLB,, | Performed by: PHYSICAL MEDICINE & REHABILITATION

## 2024-07-30 PROCEDURE — 99999 PR PBB SHADOW E&M-EST. PATIENT-LVL III: CPT | Mod: PBBFAC,,, | Performed by: PHYSICAL MEDICINE & REHABILITATION

## 2024-07-30 PROCEDURE — 1159F MED LIST DOCD IN RCRD: CPT | Mod: CPTII,S$GLB,, | Performed by: PHYSICAL MEDICINE & REHABILITATION

## 2024-07-30 PROCEDURE — 99213 OFFICE O/P EST LOW 20 MIN: CPT | Mod: S$GLB,,, | Performed by: PHYSICAL MEDICINE & REHABILITATION

## 2024-07-30 PROCEDURE — 3008F BODY MASS INDEX DOCD: CPT | Mod: CPTII,S$GLB,, | Performed by: PHYSICAL MEDICINE & REHABILITATION

## 2024-07-30 NOTE — PROGRESS NOTES
SUBJECTIVE:    Patient ID: Sabrina Black is a 54 y.o. female.    Chief Complaint: Follow-up    She is here to review her lumbar MRI done 07/27/2024 to evaluate her complaint of low back pain at the lumbosacral junction without radicular symptoms.      The MRI is summarized below:      FINDINGS:  Vertebral column: There is multilevel degenerative change which will be described by level.  The lumbar vertebral bodies maintain normal height.  There is no fracture.  There is mild anterolisthesis of L2 on L3 and L5 on S1.  There is bilateral spondylolysis at the L5 level.  There is severe disc space narrowing at the L4-5 level, moderate to severe disc space narrowing at the L3-4 level and mild disc space narrowing at the L2-3 and L5-S1 levels.  There is a small remote Schmorl's node in the superior endplate of L2, L4.  The discs are desiccated.  Baseline marrow signal is normal.  There is no pathologic enhancement in the vertebral bodies or discs.  There has been previous decompression with right laminectomy at the L4-5 level.     Spinal canal, conus, epidural space: The spinal canal is developmentally normal.  The conus terminates at the level of T12-L1 and is normal in contour and signal intensity.  There is no abnormal epidural mass or fluid collection.     Findings by level:     On the sagittal images, the T11-12 level is normal.     T12-L1: There is a minimal disc bulge and mild facet joint arthropathy.  There is no spinal canal or significant foraminal stenosis.     L1-2: There is bilateral facet joint arthropathy with ligamentum flavum thickening in addition to a diffuse disc bulge.  There is flattening of the ventral dural sac.  There is no spinal stenosis.  There is mild bilateral foraminal stenosis.     L2-3: There is disc space narrowing, mild anterolisthesis of L2 on L3.  There is bilateral facet joint arthropathy with ligamentum flavum thickening.  There is a diffuse disc bulge.  There is flattening  of the dural sac.  There is mild spinal stenosis with mild-to-moderate left and mild right foraminal stenosis.  There is crowding of the lateral recess bilaterally.     L3-4: There is disc space narrowing, diffuse disc bulge with osteophytic ridging in addition to bilateral facet joint arthropathy.  There is no significant spinal stenosis.  There is mild bilateral foraminal stenosis.     L4-5: There is severe disc space narrowing.  There has been posterior decompression.  There is facet joint arthropathy.  There is osteophytic ridging with a shallow left paracentral disc protrusion.  There is no spinal stenosis.  There is crowding of the lateral recess, left greater than right.  Also, there is at least mild bilateral foraminal stenosis.  There is mild enhancement in the right ventral epidural space, surrounding the right L5 root.     L5-S1: There is mild anterolisthesis of L5 on S1.  There is bilateral spondylolysis.  Also there is right greater than left facet joint arthropathy.  There is unroofing of a diffuse disc bulge.  There is no spinal stenosis.  There is severe right and moderate to severe left foraminal stenosis.     Soft tissues: The prevertebral soft tissues are grossly normal.  The posterior spinous muscles are mildly atrophic.  The aorta is normal in caliber.  There is no hydronephrosis.  There is a 2.7 cm right renal cyst.     Impression:     1. There is multilevel degenerative disc and facet disease discussed above.  There is no fracture.  Also there has been decompression with right-sided laminectomy at the L4-5 level.  2. At the L5-S1 level there is grade 1 spondylolisthesis with bilateral spondylolysis in addition to facet joint arthropathy.  There is severe right and moderate to severe left foraminal stenosis at this level.  3. At the L4-5 level there is crowding of the lateral recess, left greater than right and mild bilateral foraminal stenosis without spinal stenosis.  There is mild  "enhancement in the right ventral epidural space surrounding the L5 root.  4. At the L3-4 level there is only mild bilateral foraminal stenosis.  5. At the L2-3 level there is mild spinal stenosis with mild to moderate left and mild right foraminal stenosis.  There is also crowding of the lateral recess bilaterally.      Clinically she is about the same.  Her symptoms are described above.  She is also complaining of some chronic numbness in the left foot.  That has not changed.  Current pain level is 4/10 and interferes with quality of life in terms of activities of daily living recreation and social activities          Past Medical History:   Diagnosis Date    Bipolar 1 disorder     Diabetes mellitus, type 2     pt states "no longer diabetic since I lost 100lbs"    Hyperlipidemia     Hypertension     Knee injury     Lumbar pain     herniated disc     Parkinson's disease     Thyroid disease      Social History     Socioeconomic History    Marital status:    Tobacco Use    Smoking status: Former     Current packs/day: 0.00     Average packs/day: 1 pack/day for 20.0 years (20.0 ttl pk-yrs)     Types: Cigarettes     Start date:      Quit date:      Years since quittin.5    Smokeless tobacco: Never   Substance and Sexual Activity    Alcohol use: No    Drug use: No    Sexual activity: Not Currently     Social Determinants of Health      Received from [facility], [facility]    Housing Stability     Past Surgical History:   Procedure Laterality Date    ABLATION      CARPAL TUNNEL RELEASE Right 2019    Procedure: RELEASE, CARPAL TUNNEL;  Surgeon: Alfredo Blanc MD;  Location: [facility] OR;  Service: Orthopedics;  Laterality: Right;    CHOLECYSTECTOMY  2018    Dr. Petersen     COLONOSCOPY N/A 10/13/2022    Procedure: COLONOSCOPY;  Surgeon: Kelechi Sheehan MD;  Location: [facility] ENDO;  Service: Endoscopy;  Laterality: N/A;    knee and back surgery " " 1994    Back surgery 1994    KNEE SURGERY Right 06/2019    LAPAROSCOPIC GASTRIC BANDING  2014    Gastric sleeve    NECK SURGERY  2004    neck     REMOVAL OF BONE SPUR OF FOOT Right 2010    TOTAL KNEE ARTHROPLASTY Right 06/05/2019     Family History   Problem Relation Name Age of Onset    Cancer Father      Diabetes Father      Hyperlipidemia Father      Cancer Paternal Aunt      Cancer Maternal Grandfather      Hyperlipidemia Maternal Grandfather      Hyperlipidemia Paternal Grandmother      Hyperlipidemia Paternal Grandfather       Vitals:    07/30/24 1012   Weight: 72.6 kg (160 lb 0.9 oz)   Height: 5' 8" (1.727 m)       Review of Systems   Constitutional:  Negative for chills, diaphoresis, fatigue, fever and unexpected weight change.   HENT:  Negative for trouble swallowing.    Eyes:  Negative for visual disturbance.   Respiratory:  Negative for shortness of breath.    Cardiovascular:  Negative for chest pain.   Gastrointestinal:  Negative for abdominal pain, constipation, nausea and vomiting.   Genitourinary:  Negative for difficulty urinating.   Musculoskeletal:  Negative for arthralgias, back pain, gait problem, joint swelling, myalgias, neck pain and neck stiffness.   Neurological:  Negative for dizziness, speech difficulty, weakness, light-headedness, numbness and headaches.          Objective:      Physical Exam  Neurological:      Mental Status: She is alert and oriented to person, place, and time.             Assessment:       1. Chronic bilateral low back pain without sciatica           Plan:     I reassured her there are no worrisome findings on her MRI.  I offered her neurosurgical evaluation which she politely declines.  I have no additional recommendations.  She can follow up here as needed      Chronic bilateral low back pain without sciatica          "

## 2024-08-02 ENCOUNTER — HOSPITAL ENCOUNTER (EMERGENCY)
Facility: HOSPITAL | Age: 54
Discharge: HOME | End: 2024-08-02
Attending: EMERGENCY MEDICINE
Payer: COMMERCIAL

## 2024-08-02 VITALS
SYSTOLIC BLOOD PRESSURE: 142 MMHG | OXYGEN SATURATION: 96 % | HEART RATE: 72 BPM | DIASTOLIC BLOOD PRESSURE: 75 MMHG | BODY MASS INDEX: 25.01 KG/M2 | WEIGHT: 165 LBS | TEMPERATURE: 98 F | HEIGHT: 68 IN | RESPIRATION RATE: 16 BRPM

## 2024-08-02 DIAGNOSIS — S22.32XB OPEN FRACTURE OF ONE RIB OF LEFT SIDE, INITIAL ENCOUNTER: ICD-10-CM

## 2024-08-02 DIAGNOSIS — S29.9XXA CHEST TRAUMA: ICD-10-CM

## 2024-08-02 DIAGNOSIS — S32.000A CLOSED COMPRESSION FRACTURE OF LUMBOSACRAL SPINE, INITIAL ENCOUNTER: Primary | ICD-10-CM
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
## 2024-08-02 DIAGNOSIS — S62.604A CLOSED DISPLACED FRACTURE OF PHALANX OF RIGHT RING FINGER, UNSPECIFIED PHALANX, INITIAL ENCOUNTER: ICD-10-CM

## 2024-08-02 LAB
ALBUMIN SERPL BCP-MCNC: 3.7 G/DL (ref 3.5–5.2)
ALP SERPL-CCNC: 122 U/L (ref 55–135)
ALT SERPL W/O P-5'-P-CCNC: 18 U/L (ref 10–44)
ANION GAP SERPL CALC-SCNC: 4 MMOL/L (ref 8–16)
AST SERPL-CCNC: 22 U/L (ref 10–40)
BASOPHILS # BLD AUTO: 0.04 K/UL (ref 0–0.2)
BASOPHILS NFR BLD: 0.6 % (ref 0–1.9)
BILIRUB SERPL-MCNC: 0.5 MG/DL (ref 0.1–1)
BILIRUB UR QL STRIP: NEGATIVE
BILIRUB UR QL STRIP: NEGATIVE
BUN SERPL-MCNC: 18 MG/DL (ref 6–20)
CALCIUM SERPL-MCNC: 9 MG/DL (ref 8.7–10.5)
CHLORIDE SERPL-SCNC: 105 MMOL/L (ref 95–110)
CLARITY UR: CLEAR
CLARITY UR: CLEAR
CO2 SERPL-SCNC: 30 MMOL/L (ref 23–29)
COLOR UR: YELLOW
COLOR UR: YELLOW
CREAT SERPL-MCNC: 0.9 MG/DL (ref 0.5–1.4)
CREAT SERPL-MCNC: 0.9 MG/DL (ref 0.5–1.4)
DIFFERENTIAL METHOD BLD: ABNORMAL
EOSINOPHIL # BLD AUTO: 0.1 K/UL (ref 0–0.5)
EOSINOPHIL NFR BLD: 1 % (ref 0–8)
ERYTHROCYTE [DISTWIDTH] IN BLOOD BY AUTOMATED COUNT: 14.4 % (ref 11.5–14.5)
EST. GFR  (NO RACE VARIABLE): >60 ML/MIN/1.73 M^2
GLUCOSE SERPL-MCNC: 119 MG/DL (ref 70–110)
GLUCOSE UR QL STRIP: NEGATIVE
GLUCOSE UR QL STRIP: NEGATIVE
HCT VFR BLD AUTO: 40.8 % (ref 37–48.5)
HGB BLD-MCNC: 13.1 G/DL (ref 12–16)
HGB UR QL STRIP: NEGATIVE
HGB UR QL STRIP: NEGATIVE
IMM GRANULOCYTES # BLD AUTO: 0.06 K/UL (ref 0–0.04)
IMM GRANULOCYTES NFR BLD AUTO: 0.9 % (ref 0–0.5)
KETONES UR QL STRIP: NEGATIVE
KETONES UR QL STRIP: NEGATIVE
LEUKOCYTE ESTERASE UR QL STRIP: NEGATIVE
LEUKOCYTE ESTERASE UR QL STRIP: NEGATIVE
LIPASE SERPL-CCNC: 14 U/L (ref 4–60)
LYMPHOCYTES # BLD AUTO: 1.1 K/UL (ref 1–4.8)
LYMPHOCYTES NFR BLD: 16.6 % (ref 18–48)
MCH RBC QN AUTO: 29.8 PG (ref 27–31)
MCHC RBC AUTO-ENTMCNC: 32.1 G/DL (ref 32–36)
MCV RBC AUTO: 93 FL (ref 82–98)
MONOCYTES # BLD AUTO: 0.4 K/UL (ref 0.3–1)
MONOCYTES NFR BLD: 5.2 % (ref 4–15)
NEUTROPHILS # BLD AUTO: 5.1 K/UL (ref 1.8–7.7)
NEUTROPHILS NFR BLD: 75.7 % (ref 38–73)
NITRITE UR QL STRIP: NEGATIVE
NITRITE UR QL STRIP: NEGATIVE
NRBC BLD-RTO: 0 /100 WBC
PH UR STRIP: 7 [PH] (ref 5–8)
PH UR STRIP: 7 [PH] (ref 5–8)
PLATELET # BLD AUTO: 229 K/UL (ref 150–450)
PMV BLD AUTO: 9.4 FL (ref 9.2–12.9)
POTASSIUM SERPL-SCNC: 4.3 MMOL/L (ref 3.5–5.1)
PROT SERPL-MCNC: 6.8 G/DL (ref 6–8.4)
PROT UR QL STRIP: ABNORMAL
PROT UR QL STRIP: ABNORMAL
RBC # BLD AUTO: 4.4 M/UL (ref 4–5.4)
SAMPLE: NORMAL
SODIUM SERPL-SCNC: 139 MMOL/L (ref 136–145)
SP GR UR STRIP: 1.02 (ref 1–1.03)
SP GR UR STRIP: 1.02 (ref 1–1.03)
URN SPEC COLLECT METH UR: ABNORMAL
URN SPEC COLLECT METH UR: ABNORMAL
UROBILINOGEN UR STRIP-ACNC: NEGATIVE EU/DL
UROBILINOGEN UR STRIP-ACNC: NEGATIVE EU/DL
WBC # BLD AUTO: 6.73 K/UL (ref 3.9–12.7)

## 2024-08-02 PROCEDURE — 96375 TX/PRO/DX INJ NEW DRUG ADDON: CPT

## 2024-08-02 PROCEDURE — 80053 COMPREHEN METABOLIC PANEL: CPT | Performed by: NURSE PRACTITIONER

## 2024-08-02 PROCEDURE — 99285 EMERGENCY DEPT VISIT HI MDM: CPT | Mod: 25

## 2024-08-02 PROCEDURE — 63600175 PHARM REV CODE 636 W HCPCS: Performed by: NURSE PRACTITIONER

## 2024-08-02 PROCEDURE — 81003 URINALYSIS AUTO W/O SCOPE: CPT | Performed by: NURSE PRACTITIONER

## 2024-08-02 PROCEDURE — 96374 THER/PROPH/DIAG INJ IV PUSH: CPT

## 2024-08-02 PROCEDURE — 85025 COMPLETE CBC W/AUTO DIFF WBC: CPT | Performed by: NURSE PRACTITIONER

## 2024-08-02 PROCEDURE — 82565 ASSAY OF CREATININE: CPT

## 2024-08-02 PROCEDURE — 25500020 PHARM REV CODE 255: Performed by: NURSE PRACTITIONER

## 2024-08-02 PROCEDURE — 83690 ASSAY OF LIPASE: CPT | Performed by: NURSE PRACTITIONER

## 2024-08-02 RX ORDER — ONDANSETRON HYDROCHLORIDE 2 MG/ML
4 INJECTION, SOLUTION INTRAVENOUS
Status: COMPLETED | OUTPATIENT
Start: 2024-08-02 | End: 2024-08-02

## 2024-08-02 RX ORDER — METHOCARBAMOL 500 MG/1
500 TABLET, FILM COATED ORAL 3 TIMES DAILY
Qty: 15 TABLET | Refills: 0 | Status: SHIPPED | OUTPATIENT
Start: 2024-08-02 | End: 2024-08-07

## 2024-08-02 RX ORDER — OXYCODONE HYDROCHLORIDE 5 MG/1
5 TABLET ORAL EVERY 4 HOURS PRN
Qty: 12 TABLET | Refills: 0 | Status: SHIPPED | OUTPATIENT
Start: 2024-08-02

## 2024-08-02 RX ORDER — HYDROMORPHONE HYDROCHLORIDE 1 MG/ML
1 INJECTION, SOLUTION INTRAMUSCULAR; INTRAVENOUS; SUBCUTANEOUS
Status: COMPLETED | OUTPATIENT
Start: 2024-08-02 | End: 2024-08-02

## 2024-08-02 RX ADMIN — IOHEXOL 100 ML: 350 INJECTION, SOLUTION INTRAVENOUS at 11:08

## 2024-08-02 RX ADMIN — HYDROMORPHONE HYDROCHLORIDE 1 MG: 1 INJECTION, SOLUTION INTRAMUSCULAR; INTRAVENOUS; SUBCUTANEOUS at 11:08

## 2024-08-02 RX ADMIN — ONDANSETRON 4 MG: 2 INJECTION INTRAMUSCULAR; INTRAVENOUS at 11:08

## 2024-09-03 DIAGNOSIS — M79.641 RIGHT HAND PAIN: Primary | ICD-10-CM

## 2024-09-04 ENCOUNTER — HOSPITAL ENCOUNTER (OUTPATIENT)
Dept: RADIOLOGY | Facility: HOSPITAL | Age: 54
Discharge: HOME | End: 2024-09-04
Attending: ORTHOPAEDIC SURGERY
Payer: MEDICARE

## 2024-09-04 ENCOUNTER — OFFICE VISIT (OUTPATIENT)
Dept: ORTHOPEDICS | Facility: CLINIC | Age: 54
End: 2024-09-04
Payer: MEDICARE

## 2024-09-04 VITALS — WEIGHT: 165 LBS | BODY MASS INDEX: 25.01 KG/M2 | HEIGHT: 68 IN

## 2024-09-04 DIAGNOSIS — M79.641 RIGHT HAND PAIN: ICD-10-CM

## 2024-09-04 DIAGNOSIS — S62.636A CLOSED DISPLACED FRACTURE OF DISTAL PHALANX OF RIGHT LITTLE FINGER, INITIAL ENCOUNTER: ICD-10-CM

## 2024-09-04 DIAGNOSIS — M79.641 RIGHT HAND PAIN: Primary | ICD-10-CM

## 2024-09-04 PROCEDURE — 1159F MED LIST DOCD IN RCRD: CPT | Mod: CPTII,S$GLB,, | Performed by: ORTHOPAEDIC SURGERY

## 2024-09-04 PROCEDURE — 99999 PR PBB SHADOW E&M-EST. PATIENT-LVL III: CPT | Mod: PBBFAC,,, | Performed by: ORTHOPAEDIC SURGERY

## 2024-09-04 PROCEDURE — 73130 X-RAY EXAM OF HAND: CPT | Mod: 26,RT,, | Performed by: RADIOLOGY

## 2024-09-04 PROCEDURE — 3008F BODY MASS INDEX DOCD: CPT | Mod: CPTII,S$GLB,, | Performed by: ORTHOPAEDIC SURGERY

## 2024-09-04 PROCEDURE — 1160F RVW MEDS BY RX/DR IN RCRD: CPT | Mod: CPTII,S$GLB,, | Performed by: ORTHOPAEDIC SURGERY

## 2024-09-04 PROCEDURE — 73130 X-RAY EXAM OF HAND: CPT | Mod: TC,PO,RT

## 2024-09-04 PROCEDURE — 99205 OFFICE O/P NEW HI 60 MIN: CPT | Mod: S$GLB,,, | Performed by: ORTHOPAEDIC SURGERY
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
## 2024-09-06 ENCOUNTER — OFFICE VISIT (OUTPATIENT)
Dept: PODIATRY | Facility: CLINIC | Age: 54
End: 2024-09-06
Payer: MEDICARE

## 2024-09-06 ENCOUNTER — HOSPITAL ENCOUNTER (OUTPATIENT)
Dept: RADIOLOGY | Facility: CLINIC | Age: 54
Discharge: HOME | End: 2024-09-06
Attending: PODIATRIST
Payer: MEDICARE

## 2024-09-06 VITALS — BODY MASS INDEX: 27.26 KG/M2 | HEIGHT: 68 IN | WEIGHT: 179.88 LBS

## 2024-09-06 DIAGNOSIS — M79.674 PAIN OF TOE OF RIGHT FOOT: ICD-10-CM

## 2024-09-06 DIAGNOSIS — S92.414D CLOSED NONDISPLACED FRACTURE OF PROXIMAL PHALANX OF RIGHT GREAT TOE WITH ROUTINE HEALING, SUBSEQUENT ENCOUNTER: Primary | ICD-10-CM

## 2024-09-06 PROCEDURE — 73630 X-RAY EXAM OF FOOT: CPT | Mod: RT,S$GLB,, | Performed by: RADIOLOGY

## 2024-09-06 PROCEDURE — 99999 PR PBB SHADOW E&M-EST. PATIENT-LVL III: CPT | Mod: PBBFAC,,, | Performed by: PODIATRIST

## 2024-09-06 NOTE — PATIENT INSTRUCTIONS
Closed Toe Fracture  Your toe is broken (fractured). This causes local pain, swelling, and sometimes bruising. This injury usually takes about 4 to 6 weeks to heal, but can sometimes take longer. Toe injuries are often treated by taping the injured toe to the next one (buddy taping). This protects the injured toe and holds it in position.     If the toenail has been severely injured, it may fall off in 1 to 2 weeks. It takes up to 12 months for a new toenail to grow back.  Home care  Follow these guidelines when caring for yourself at home:  You may be given a cast shoe to wear to keep your toe from moving. If not, you can use a sandal or any shoe that doesnt put pressure on the injured toe until the swelling and pain go away. If using a sandal, be careful not to strike your foot against anything. Another injury could make the fracture worse. If you were given crutches, dont put full weight on the injured foot until you can do so without pain, or as directed by your healthcare provider.  Keep your foot elevated to reduce pain and swelling. When sleeping, put a pillow under the injured leg. When sitting, support the injured leg so it is above your waist. This is very important during the first 2 days (48 hours).  Put an ice pack on the injured area. Do this for 20 minutes every 1 to 2 hours the first day for pain relief. You can make an ice pack by wrapping a plastic bag of ice cubes in a thin towel. As the ice melts, be careful that any cloth or paper tape doesnt get wet. Continue using the ice pack 3 to 4 times a day for the next 2 days. Then use the ice pack as needed to ease pain and swelling.  If buddy tape was used and it becomes wet or dirty, change it. You may replace it with paper, plastic, or cloth tape. Cloth tape and paper tapes must be kept dry.  You may use acetaminophen or ibuprofen to control pain, unless another pain medicine was prescribed. If you have chronic liver or kidney disease, talk with  your healthcare provider before using these medicines. Also talk with your provider if youve had a stomach ulcer or gastrointestinal bleeding.  You may return to sports or physical education activities after 4 weeks when you can run without pain, or as directed by your healthcare provider.  Follow-up care  Follow up with your healthcare provider in 1 week, or as advised. This is to make sure the bone is healing the way it should.  X-rays may be taken. You will be told of any new findings that may affect your care.  When to seek medical advice  Call your healthcare provider right away if any of these occur:  Pain or swelling gets worse  The cast/splint cracks  The cast and padding get wet and stays wet more than 24 hours  Bad odor from the cast/splint or wound fluid stains the cast  Tightness or pressure under the cast/splint gets worse  Toe becomes cold, blue, numb, or tingly  You cant move the toe  Signs of infection: fever, redness, warmth, swelling, or drainage from the wound or cast  Fever of 100.4ºF (38ºC) or higher, or as directed by your healthcare provider  Date Last Reviewed: 2/1/2017  © 5199-5189 [employer]. [address] [place] [address]. All rights reserved. This information is not intended as a substitute for professional medical care. Always follow your healthcare professional's instructions.
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
## 2024-09-24 ENCOUNTER — OFFICE VISIT (OUTPATIENT)
Dept: OPTOMETRY | Facility: CLINIC | Age: 54
End: 2024-09-24
Payer: COMMERCIAL

## 2024-09-24 ENCOUNTER — OFFICE VISIT (OUTPATIENT)
Dept: OPTOMETRY | Facility: CLINIC | Age: 54
End: 2024-09-24
Payer: MEDICARE

## 2024-09-24 DIAGNOSIS — H52.7 REFRACTIVE ERROR: ICD-10-CM

## 2024-09-24 DIAGNOSIS — H25.13 NUCLEAR SCLEROSIS, BILATERAL: ICD-10-CM

## 2024-09-24 DIAGNOSIS — Z46.0 CONTACT LENS/GLASSES FITTING: Primary | ICD-10-CM

## 2024-09-24 DIAGNOSIS — Z01.00 EXAMINATION OF EYES AND VISION: Primary | ICD-10-CM

## 2024-09-24 PROCEDURE — 92310 CONTACT LENS FITTING OU: CPT | Mod: CSM,,, | Performed by: OPTOMETRIST

## 2024-09-24 PROCEDURE — 99999 PR PBB SHADOW E&M-EST. PATIENT-LVL II: CPT | Mod: PBBFAC,,, | Performed by: OPTOMETRIST

## 2024-09-24 PROCEDURE — 99499 UNLISTED E&M SERVICE: CPT | Mod: ,,, | Performed by: OPTOMETRIST

## 2024-09-24 PROCEDURE — 92015 DETERMINE REFRACTIVE STATE: CPT | Mod: S$GLB,,, | Performed by: OPTOMETRIST

## 2024-09-24 PROCEDURE — 92004 COMPRE OPH EXAM NEW PT 1/>: CPT | Mod: S$GLB,,, | Performed by: OPTOMETRIST

## 2024-09-24 NOTE — PROGRESS NOTES
"HPI    Pt states in the past year, has noticed vision in OU has gotten worse.   Trouble focusing and a lot of trouble with near vision. Using OTC readers   but does not help much. Pt only wears a CL in OS.     Pt c/o constant watering    Pt sometimes sleeps in lenses, replaces lens "every few weeks"    (-)gtts  (-)headaches  (-)FOL, floaters     Acuvue Catrina  Wears daily  Sleeps in them 3-4 days   Disposes of every 2 weeks    Last edited by Jace Montiel, OD on 9/24/2024  8:14 AM.            Assessment /Plan     For exam results, see Encounter Report.    Examination of eyes and vision    Nuclear sclerosis, bilateral    Refractive error      1. Examination of eyes and vision    2. Nuclear sclerosis, bilateral  Mild OU, not VS  Discussed possible ocular affects of cataracts. Acceptable BCVA OU.  Discussed treatment options. Surgery not recommended at this time.   Monitor yearly.     3. Refractive error  Dispensed updated spectacle Rx. Discussed various spectacle lens options. Discussed adaptation period to new specs.     Discussed cls options -- occasional EW  Switch to Air Optix N&D for breathability  Dispensed CLs trials for monovision (OD near / OS distance). Daily wear only, dispose of monthly.   Discussed proper hand hygiene and wear/care of lenses. Do not sleep/swim/shower in lenses.   Discontinue CL wear ASAP and RTC if any redness or discomfort occurs.   Try x 1 week, report back with progress                   "

## 2024-09-24 NOTE — PROGRESS NOTES
Assessment /Plan     For exam results, see Encounter Report.    There are no diagnoses linked to this encounter.  Patient is here for a comprehensive eye exam and contact lens fit. See other exam visit with same encounter date 09/24/2024 for detailed exam information.

## 2024-10-10 ENCOUNTER — TELEPHONE (OUTPATIENT)
Dept: PAIN MEDICINE | Facility: CLINIC | Age: 54
End: 2024-10-10
Payer: MEDICARE

## 2024-10-10 NOTE — TELEPHONE ENCOUNTER
----- Message from Noelle sent at 10/10/2024  2:47 PM CDT -----  Hello,    Patient is being referred for back pain.  Patient was previously seen by. Dr. Jimi Ellington. Patient has questions before scheduling appointment.  Referral is scanned in media mgr. Please contact patient to schedule.        Thanks

## 2024-10-15 ENCOUNTER — OFFICE VISIT (OUTPATIENT)
Dept: PAIN MEDICINE | Facility: CLINIC | Age: 54
End: 2024-10-15
Payer: MEDICARE

## 2024-10-15 ENCOUNTER — TELEPHONE (OUTPATIENT)
Dept: PAIN MEDICINE | Facility: CLINIC | Age: 54
End: 2024-10-15

## 2024-10-15 VITALS
DIASTOLIC BLOOD PRESSURE: 77 MMHG | HEART RATE: 91 BPM | SYSTOLIC BLOOD PRESSURE: 122 MMHG | BODY MASS INDEX: 27.35 KG/M2 | HEIGHT: 68 IN

## 2024-10-15 DIAGNOSIS — M54.41 CHRONIC BILATERAL LOW BACK PAIN WITH BILATERAL SCIATICA: ICD-10-CM

## 2024-10-15 DIAGNOSIS — M54.17 LUMBOSACRAL RADICULOPATHY: Primary | ICD-10-CM

## 2024-10-15 DIAGNOSIS — M43.10 SPONDYLOLISTHESIS, UNSPECIFIED SPINAL REGION: ICD-10-CM

## 2024-10-15 DIAGNOSIS — M51.369 ANNULAR TEAR OF LUMBAR DISC: ICD-10-CM

## 2024-10-15 DIAGNOSIS — G89.29 CHRONIC BILATERAL LOW BACK PAIN WITH BILATERAL SCIATICA: ICD-10-CM

## 2024-10-15 DIAGNOSIS — M51.362 DEGENERATION OF INTERVERTEBRAL DISC OF LUMBAR REGION WITH DISCOGENIC BACK PAIN AND LOWER EXTREMITY PAIN: ICD-10-CM

## 2024-10-15 DIAGNOSIS — M54.42 CHRONIC BILATERAL LOW BACK PAIN WITH BILATERAL SCIATICA: ICD-10-CM

## 2024-10-15 PROCEDURE — 99999 PR PBB SHADOW E&M-EST. PATIENT-LVL III: CPT | Mod: PBBFAC,,, | Performed by: STUDENT IN AN ORGANIZED HEALTH CARE EDUCATION/TRAINING PROGRAM

## 2024-10-15 RX ORDER — CELECOXIB 100 MG/1
100 CAPSULE ORAL 2 TIMES DAILY
Qty: 60 CAPSULE | Refills: 0 | Status: SHIPPED | OUTPATIENT
Start: 2024-10-15 | End: 2024-11-14
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
## 2024-10-15 NOTE — TELEPHONE ENCOUNTER
Types of orders made on 10/15/2024: Imaging, Medications, Procedure Request      Order Date:10/15/2024   Ordering User:LUCIANO POWELL [[id number]]   Encounter Provider:Luciano Powell MD [[id number]]   Authorizing Provider: Luciano Powell MD [[id number]]   Department:[facility] PAIN MANAGEMENT[[id number]]      Common Order Information   Procedure -> Epidural Injection (specify level) Cmt: caudal      Order Specific Information   Order: Procedure Request Order for Pain Management [Custom: [id number]]  Order #:          [id number]: 1 FUTURE     Priority: Routine  Class: Clinic Performed     Future Order Information       Expires on:10/15/2025            Expected by:10/15/2024                   Associated Diagnoses       M51.362 Degeneration of intervertebral disc of lumbar region with       discogenic back pain and lower extremity pain       M54.17 Lumbosacral radiculopathy       Physician -> manny          Is patient on anti-coagulants? -> No          Facility Name: -> [facility]          Follow-up: -> 4 weeks              Priority: Routine  Class: Clinic Performed     Future Order Information       Expires on:10/15/2025            Expected by:10/15/2024                   Associated Diagnoses       M51.362 Degeneration of intervertebral disc of lumbar region with       discogenic back pain and lower extremity pain       M54.17 Lumbosacral radiculopathy       Procedure -> Epidural Injection (specify level) Cmt: caudal          Physician -> manny          Is patient on anti-coagulants? -> No

## 2024-10-23 RX ORDER — SODIUM CHLORIDE, SODIUM LACTATE, POTASSIUM CHLORIDE, CALCIUM CHLORIDE 600; 310; 30; 20 MG/100ML; MG/100ML; MG/100ML; MG/100ML
INJECTION, SOLUTION INTRAVENOUS CONTINUOUS
OUTPATIENT
Start: 2024-10-23

## 2024-10-23 RX ORDER — LIDOCAINE HYDROCHLORIDE 10 MG/ML
1 INJECTION, SOLUTION EPIDURAL; INFILTRATION; INTRACAUDAL; PERINEURAL ONCE
OUTPATIENT
Start: 2024-10-23 | End: 2024-10-23

## 2024-10-24 ENCOUNTER — TELEPHONE (OUTPATIENT)
Dept: PAIN MEDICINE | Facility: CLINIC | Age: 54
End: 2024-10-24
Payer: MEDICARE

## 2024-10-24 ENCOUNTER — HOSPITAL ENCOUNTER (OUTPATIENT)
Dept: RADIOLOGY | Facility: HOSPITAL | Age: 54
Discharge: HOME | End: 2024-10-24
Attending: STUDENT IN AN ORGANIZED HEALTH CARE EDUCATION/TRAINING PROGRAM
Payer: MEDICARE

## 2024-10-24 DIAGNOSIS — M43.10 SPONDYLOLISTHESIS, UNSPECIFIED SPINAL REGION: ICD-10-CM

## 2024-10-24 PROCEDURE — 72120 X-RAY BEND ONLY L-S SPINE: CPT | Mod: TC

## 2024-10-24 PROCEDURE — 72120 X-RAY BEND ONLY L-S SPINE: CPT | Mod: 26,,, | Performed by: RADIOLOGY

## 2024-10-24 NOTE — TELEPHONE ENCOUNTER
----- Message from Anisa sent at 10/24/2024 12:38 PM CDT -----  Regarding: Needs return call  Type: Needs Medical Advice  Who Called:  Pt    Best Call Back Number: [phone]    Additional Information: Pt states the medication that she was given is not working, please saba lto abram

## 2024-10-24 NOTE — TELEPHONE ENCOUNTER
Pt was given celebrex on 10/15 states its not working, she is having epidural injection tomorrow. Any advise until after injection?

## 2024-10-25 ENCOUNTER — HOSPITAL ENCOUNTER (OUTPATIENT)
Facility: HOSPITAL | Age: 54
Discharge: HOME | End: 2024-10-25
Attending: STUDENT IN AN ORGANIZED HEALTH CARE EDUCATION/TRAINING PROGRAM | Admitting: STUDENT IN AN ORGANIZED HEALTH CARE EDUCATION/TRAINING PROGRAM
Payer: MEDICARE

## 2024-10-25 DIAGNOSIS — M54.16 LUMBAR RADICULITIS: ICD-10-CM

## 2024-10-25 PROCEDURE — 62323 NJX INTERLAMINAR LMBR/SAC: CPT

## 2024-10-25 PROCEDURE — 62323 NJX INTERLAMINAR LMBR/SAC: CPT | Mod: ,,, | Performed by: STUDENT IN AN ORGANIZED HEALTH CARE EDUCATION/TRAINING PROGRAM

## 2024-10-25 PROCEDURE — 71000015 HC POSTOP RECOV 1ST HR: Performed by: STUDENT IN AN ORGANIZED HEALTH CARE EDUCATION/TRAINING PROGRAM

## 2024-10-25 PROCEDURE — 25500020 PHARM REV CODE 255: Performed by: STUDENT IN AN ORGANIZED HEALTH CARE EDUCATION/TRAINING PROGRAM

## 2024-10-25 PROCEDURE — 63600175 PHARM REV CODE 636 W HCPCS: Mod: JZ,JG | Performed by: STUDENT IN AN ORGANIZED HEALTH CARE EDUCATION/TRAINING PROGRAM

## 2024-10-25 PROCEDURE — 36000704 HC OR TIME LEV I 1ST 15 MIN: Performed by: STUDENT IN AN ORGANIZED HEALTH CARE EDUCATION/TRAINING PROGRAM

## 2024-10-25 RX ORDER — BUPIVACAINE HYDROCHLORIDE 2.5 MG/ML
INJECTION, SOLUTION EPIDURAL; INFILTRATION; INTRACAUDAL
Status: DISCONTINUED | OUTPATIENT
Start: 2024-10-25 | End: 2024-10-25 | Stop reason: HOSPADM

## 2024-10-25 RX ORDER — LIDOCAINE HYDROCHLORIDE 10 MG/ML
INJECTION, SOLUTION EPIDURAL; INFILTRATION; INTRACAUDAL; PERINEURAL
Status: DISCONTINUED | OUTPATIENT
Start: 2024-10-25 | End: 2024-10-25 | Stop reason: HOSPADM

## 2024-10-25 RX ORDER — METHYLPREDNISOLONE ACETATE 80 MG/ML
INJECTION, SUSPENSION INTRA-ARTICULAR; INTRALESIONAL; INTRAMUSCULAR; SOFT TISSUE
Status: DISCONTINUED | OUTPATIENT
Start: 2024-10-25 | End: 2024-10-25 | Stop reason: HOSPADM

## 2024-10-28 VITALS
TEMPERATURE: 98 F | RESPIRATION RATE: 18 BRPM | HEIGHT: 68 IN | WEIGHT: 179.88 LBS | DIASTOLIC BLOOD PRESSURE: 79 MMHG | SYSTOLIC BLOOD PRESSURE: 128 MMHG | BODY MASS INDEX: 27.26 KG/M2 | OXYGEN SATURATION: 96 % | HEART RATE: 82 BPM

## 2024-11-09 DIAGNOSIS — M54.42 CHRONIC BILATERAL LOW BACK PAIN WITH BILATERAL SCIATICA: ICD-10-CM

## 2024-11-09 DIAGNOSIS — G89.29 CHRONIC BILATERAL LOW BACK PAIN WITH BILATERAL SCIATICA: ICD-10-CM

## 2024-11-09 DIAGNOSIS — M54.41 CHRONIC BILATERAL LOW BACK PAIN WITH BILATERAL SCIATICA: ICD-10-CM

## 2024-11-11 RX ORDER — CELECOXIB 100 MG/1
100 CAPSULE ORAL 2 TIMES DAILY
Qty: 60 CAPSULE | Refills: 0 | Status: SHIPPED | OUTPATIENT
Start: 2024-11-11

## 2024-11-12 ENCOUNTER — TELEPHONE (OUTPATIENT)
Dept: PAIN MEDICINE | Facility: CLINIC | Age: 54
End: 2024-11-12
Payer: MEDICARE

## 2024-11-12 NOTE — TELEPHONE ENCOUNTER
Pt call was returned ,pt has an appt on the next opening with  Dr.; Salomon   ----- Message from Heather sent at 11/12/2024 10:30 AM CST -----  Contact: self  Type:  Sooner Appointment Request    Caller is requesting a sooner appointment.  Caller declined first available appointment listed below.  Caller will not accept being placed on the waitlist and is requesting a message be sent to doctor.    Name of Caller:  pt  When is the first available appointment?  11/12  Symptoms:  follow up  Would the patient rather a call back or a response via [facility]? call  Best Call Back Number:  [phone]   Additional Information:  pt states she forgot the appt today and would like to reschedule.please call

## 2024-12-03 ENCOUNTER — TELEPHONE (OUTPATIENT)
Dept: PAIN MEDICINE | Facility: CLINIC | Age: 54
End: 2024-12-03

## 2024-12-03 ENCOUNTER — OFFICE VISIT (OUTPATIENT)
Dept: PAIN MEDICINE | Facility: CLINIC | Age: 54
End: 2024-12-03
Payer: MEDICARE

## 2024-12-03 VITALS
HEIGHT: 68 IN | DIASTOLIC BLOOD PRESSURE: 82 MMHG | SYSTOLIC BLOOD PRESSURE: 135 MMHG | BODY MASS INDEX: 27.26 KG/M2 | WEIGHT: 179.88 LBS | HEART RATE: 93 BPM

## 2024-12-03 DIAGNOSIS — M54.17 LUMBOSACRAL RADICULOPATHY: ICD-10-CM

## 2024-12-03 DIAGNOSIS — M47.816 LUMBAR SPONDYLOSIS: Primary | ICD-10-CM

## 2024-12-03 DIAGNOSIS — G89.29 CHRONIC BILATERAL LOW BACK PAIN WITH BILATERAL SCIATICA: ICD-10-CM

## 2024-12-03 DIAGNOSIS — M54.41 CHRONIC BILATERAL LOW BACK PAIN WITH BILATERAL SCIATICA: ICD-10-CM

## 2024-12-03 DIAGNOSIS — M51.369 ANNULAR TEAR OF LUMBAR DISC: ICD-10-CM

## 2024-12-03 DIAGNOSIS — M43.10 SPONDYLOLISTHESIS, UNSPECIFIED SPINAL REGION: ICD-10-CM

## 2024-12-03 DIAGNOSIS — M54.42 CHRONIC BILATERAL LOW BACK PAIN WITH BILATERAL SCIATICA: ICD-10-CM

## 2024-12-03 PROCEDURE — 3008F BODY MASS INDEX DOCD: CPT | Mod: CPTII,S$GLB,, | Performed by: STUDENT IN AN ORGANIZED HEALTH CARE EDUCATION/TRAINING PROGRAM

## 2024-12-03 PROCEDURE — 4010F ACE/ARB THERAPY RXD/TAKEN: CPT | Mod: CPTII,S$GLB,, | Performed by: STUDENT IN AN ORGANIZED HEALTH CARE EDUCATION/TRAINING PROGRAM

## 2024-12-03 PROCEDURE — 3079F DIAST BP 80-89 MM HG: CPT | Mod: CPTII,S$GLB,, | Performed by: STUDENT IN AN ORGANIZED HEALTH CARE EDUCATION/TRAINING PROGRAM

## 2024-12-03 PROCEDURE — 99214 OFFICE O/P EST MOD 30 MIN: CPT | Mod: S$GLB,,, | Performed by: STUDENT IN AN ORGANIZED HEALTH CARE EDUCATION/TRAINING PROGRAM

## 2024-12-03 PROCEDURE — 99999 PR PBB SHADOW E&M-EST. PATIENT-LVL III: CPT | Mod: PBBFAC,,, | Performed by: STUDENT IN AN ORGANIZED HEALTH CARE EDUCATION/TRAINING PROGRAM

## 2024-12-03 PROCEDURE — 1159F MED LIST DOCD IN RCRD: CPT | Mod: CPTII,S$GLB,, | Performed by: STUDENT IN AN ORGANIZED HEALTH CARE EDUCATION/TRAINING PROGRAM

## 2024-12-03 PROCEDURE — 3075F SYST BP GE 130 - 139MM HG: CPT | Mod: CPTII,S$GLB,, | Performed by: STUDENT IN AN ORGANIZED HEALTH CARE EDUCATION/TRAINING PROGRAM

## 2024-12-03 RX ORDER — HYDROCODONE BITARTRATE AND ACETAMINOPHEN 5; 325 MG/1; MG/1
1 TABLET ORAL EVERY 6 HOURS PRN
Qty: 28 TABLET | Refills: 0 | Status: SHIPPED | OUTPATIENT
Start: 2024-12-03

## 2024-12-03 NOTE — H&P (VIEW-ONLY)
"[facility]ventional [facility]    PCP:   Qasim Galindo MD    CHIEF COMPLAINT:   Procedure follow up    HISTORY OF PRESENT ILLNESS:   Sabrina Black presents for follow-up after undergoing caudal epidural steroid injection on 10/25.  She reports 4 or 5 days of 80% relief symptoms before the pain returned.  Pain is back in a similar quality, character, distribution as previously described.  Her axial pain is much worse than her leg pain.  No new neurologic signs or symptoms.      Vitals:    12/03/24 1330   PainSc:   8   PainLoc: Back     Therapy:  Yes, multiple courses   She continues to perform daily stretches and exercises to relieve her pain     Pertinent Medications:  Ibuprofen 600 mg t.i.d. p.r.n.  All other medications reviewed in the patient's chart.      Pain Intervention History:  Caudal DAKOTA only 4 or 5 days relief  Multiple epidurals, remote   Spinal cord stimulator, explanted in 2023    Review of patient's allergies indicates:   Allergen Reactions    Pcn [penicillins] Hives    Morphine Hallucinations    Tizanidine Other (See Comments)     Mouth sores    Gabapentin Nausea Only       Past Medical History:   Diagnosis Date    Bipolar 1 disorder 2011    Diabetes mellitus, type 2     pt states "no longer diabetic since I lost 100lbs"    Hyperlipidemia 2017    Hypertension     Knee injury 2011    Lumbar pain 1994    herniated disc     Parkinson's disease 2016    Thyroid disease 2014       Past Surgical History:   Procedure Laterality Date    ABLATION  1999    CARPAL TUNNEL RELEASE Right 12/11/2019    Procedure: RELEASE, CARPAL TUNNEL;  Surgeon: Alfredo Blanc MD;  Location: [facility] OR;  Service: Orthopedics;  Laterality: Right;    CAUDAL EPIDURAL STEROID INJECTION N/A 10/25/2024    Procedure: Injection-steroid-epidural-caudal;  Surgeon: Luciano Bradshaw MD;  Location: [facility] OR;  Service: Pain Management;  Laterality: N/A;    CHOLECYSTECTOMY  08/27/2018    Dr. Petersen     COLONOSCOPY N/A 10/13/2022    " "Procedure: COLONOSCOPY;  Surgeon: Kelechi Sheehan MD;  Location: [facility];  Service: Endoscopy;  Laterality: N/A;    knee and back surgery      Back surgery     KNEE SURGERY Right 2019    LAPAROSCOPIC GASTRIC BANDING      Gastric sleeve    NECK SURGERY      neck     REMOVAL OF BONE SPUR OF FOOT Right     TOTAL KNEE ARTHROPLASTY Right 2019     Social History     Tobacco Use    Smoking status: Former     Current packs/day: 0.00     Average packs/day: 1 pack/day for 20.0 years (20.0 ttl pk-yrs)     Types: Cigarettes     Start date:      Quit date:      Years since quittin.9    Smokeless tobacco: Never   Substance Use Topics    Alcohol use: No    Drug use: No        Family history reviewed in the patient's chart.     PHYSICAL EXAMINATION  VITALS:   Vitals:    24 1330   BP: 135/82   Pulse: 93   Weight: 81.6 kg (179 lb 14.3 oz)   Height: 5' 8" (1.727 m)   PainSc:   8   PainLoc: Back     GENERAL: Calm, cooperative, pleasant  CHEST: No increased work of breathing  SKIN: Intact     MSK/NEURO:  Midline and right flank surgical incisions healed   Range of motion of the lumbar spine is full in extension, rotation, and lateral bending, but limited by pain in flexion  She is tender to palpation in the lumbar paraspinals more so than the sacroiliac joints or surrounding musculature  Strength is grossly normal throughout bilateral lower extremities  Sensation to light touch is diffusely diminished in the left distal medial thigh, medial malleolus, and dorsum of the foot  Sensation to light touch is normal throughout the right lower extremity  Deep tendon reflexes are 2+ at the right patella, 1+ left patella, and trace bilateral Achilles  Plantar responses are flexor  There is no clonus  Facet loading is weakly positive  Milgrams is strongly positive for midline pain  Straight leg raise, DEBBIE, FADIR, log roll, sacral compression are negative bilaterally    LABS:  No new relevant " labs    IMAGING:    No new relevant imaging    ASSESSMENT:   54 y.o. year old female with chronic lower back pain with intermittent radicular symptoms.  Her pain is almost certainly multifactorial and involving disc disease/annular tear, facet arthropathy, and foraminal stenosis/nerve root impingement.  Believe the biggest  of her axial pain is facet spondylosis as demonstrated on the MRI where there are facet effusions present.       Encounter Diagnoses   Name Primary?    Chronic bilateral low back pain with bilateral sciatica     Lumbar spondylosis Yes    Spondylolisthesis, unspecified spinal region     Lumbosacral radiculopathy     Annular tear of lumbar disc        PLAN:  Schedule bilateral L4-5 and L5-S1 facet medial branch blocks  Prescribe Norco 5 mg dispensed 28 tablets 0 refills as a rescue medication  Consider targeting the L5-S1 disc in the future  RTC TBD by response to the diagnostic blocks      Luciano Bradshaw MD  This note was completed with dictation software and grammatical/syntax errors may exist.

## 2024-12-03 NOTE — PROGRESS NOTES
"Interventional Pain Clinic    PCP:   Qasim Galindo MD    CHIEF COMPLAINT:   Procedure follow up    HISTORY OF PRESENT ILLNESS:   Sabrina Black presents for follow-up after undergoing caudal epidural steroid injection on 10/25.  She reports 4 or 5 days of 80% relief symptoms before the pain returned.  Pain is back in a similar quality, character, distribution as previously described.  Her axial pain is much worse than her leg pain.  No new neurologic signs or symptoms.      Vitals:    12/03/24 1330   PainSc:   8   PainLoc: Back     Therapy:  Yes, multiple courses   She continues to perform daily stretches and exercises to relieve her pain     Pertinent Medications:  Ibuprofen 600 mg t.i.d. p.r.n.  All other medications reviewed in the patient's chart.      Pain Intervention History:  Caudal DAKOTA only 4 or 5 days relief  Multiple epidurals, remote   Spinal cord stimulator, explanted in 2023    Review of patient's allergies indicates:   Allergen Reactions    Pcn [penicillins] Hives    Morphine Hallucinations    Tizanidine Other (See Comments)     Mouth sores    Gabapentin Nausea Only       Past Medical History:   Diagnosis Date    Bipolar 1 disorder 2011    Diabetes mellitus, type 2     pt states "no longer diabetic since I lost 100lbs"    Hyperlipidemia 2017    Hypertension     Knee injury 2011    Lumbar pain 1994    herniated disc     Parkinson's disease 2016    Thyroid disease 2014       Past Surgical History:   Procedure Laterality Date    ABLATION  1999    CARPAL TUNNEL RELEASE Right 12/11/2019    Procedure: RELEASE, CARPAL TUNNEL;  Surgeon: Alfredo Blanc MD;  Location: [facility] OR;  Service: Orthopedics;  Laterality: Right;    CAUDAL EPIDURAL STEROID INJECTION N/A 10/25/2024    Procedure: Injection-steroid-epidural-caudal;  Surgeon: Luciano Bradshaw MD;  Location: [facility] OR;  Service: Pain Management;  Laterality: N/A;    CHOLECYSTECTOMY  08/27/2018    Dr. Petersen     COLONOSCOPY N/A 10/13/2022    " "Procedure: COLONOSCOPY;  Surgeon: Kelechi Sheehan MD;  Location: [facility];  Service: Endoscopy;  Laterality: N/A;    knee and back surgery      Back surgery     KNEE SURGERY Right 2019    LAPAROSCOPIC GASTRIC BANDING      Gastric sleeve    NECK SURGERY      neck     REMOVAL OF BONE SPUR OF FOOT Right     TOTAL KNEE ARTHROPLASTY Right 2019     Social History     Tobacco Use    Smoking status: Former     Current packs/day: 0.00     Average packs/day: 1 pack/day for 20.0 years (20.0 ttl pk-yrs)     Types: Cigarettes     Start date:      Quit date:      Years since quittin.9    Smokeless tobacco: Never   Substance Use Topics    Alcohol use: No    Drug use: No        Family history reviewed in the patient's chart.     PHYSICAL EXAMINATION  VITALS:   Vitals:    24 1330   BP: 135/82   Pulse: 93   Weight: 81.6 kg (179 lb 14.3 oz)   Height: 5' 8" (1.727 m)   PainSc:   8   PainLoc: Back     GENERAL: Calm, cooperative, pleasant  CHEST: No increased work of breathing  SKIN: Intact     MSK/NEURO:  Midline and right flank surgical incisions healed   Range of motion of the lumbar spine is full in extension, rotation, and lateral bending, but limited by pain in flexion  She is tender to palpation in the lumbar paraspinals more so than the sacroiliac joints or surrounding musculature  Strength is grossly normal throughout bilateral lower extremities  Sensation to light touch is diffusely diminished in the left distal medial thigh, medial malleolus, and dorsum of the foot  Sensation to light touch is normal throughout the right lower extremity  Deep tendon reflexes are 2+ at the right patella, 1+ left patella, and trace bilateral Achilles  Plantar responses are flexor  There is no clonus  Facet loading is weakly positive  Milgrams is strongly positive for midline pain  Straight leg raise, DEBBIE, FADIR, log roll, sacral compression are negative bilaterally    LABS:  No new relevant " labs    IMAGING:    No new relevant imaging    ASSESSMENT:   54 y.o. year old female with chronic lower back pain with intermittent radicular symptoms.  Her pain is almost certainly multifactorial and involving disc disease/annular tear, facet arthropathy, and foraminal stenosis/nerve root impingement.  Believe the biggest  of her axial pain is facet spondylosis as demonstrated on the MRI where there are facet effusions present.       Encounter Diagnoses   Name Primary?    Chronic bilateral low back pain with bilateral sciatica     Lumbar spondylosis Yes    Spondylolisthesis, unspecified spinal region     Lumbosacral radiculopathy     Annular tear of lumbar disc        PLAN:  Schedule bilateral L4-5 and L5-S1 facet medial branch blocks  Prescribe Norco 5 mg dispensed 28 tablets 0 refills as a rescue medication  Consider targeting the L5-S1 disc in the future  RTC TBD by response to the diagnostic blocks      Luciano Bradshaw MD  This note was completed with dictation software and grammatical/syntax errors may exist.

## 2024-12-03 NOTE — TELEPHONE ENCOUNTER
Please schedule this case thank you   
Spoke to pt and got her scheduled for 12/20. Instructions given.  
Types of orders made on 12/03/2024: Medications, Procedure Request      Order Date:12/3/2024   Ordering User:LUCIANO POWELL [[id number]]   Encounter Provider:Luciano Powell MD [[id number]]   Authorizing Provider: Luciano Powell MD [[id number]]   Department:[facility] PAIN MANAGEMENT[[id number]]      Common Order Information   Procedure -> Medial Branch Block (Specify level and laterality) Cmt: bilateral             L4/5 and L5/S1 facets      Order Specific Information   Order: Procedure Order to Pain Management [Custom: [id number]]  Order #:          [id number]: 1 FUTURE     Priority: Routine  Class: Clinic Performed     Future Order Information       Expires on:12/03/2025            Expected by:12/03/2024                   Associated Diagnoses       M47.816 Lumbar spondylosis       Physician -> Isaias          Is patient on anti-coagulants? Cmt: dont stop any meds          Facility Name: -> [facility]              Priority: Routine  Class: Clinic Performed     Future Order Information       Expires on:12/03/2025            Expected by:12/03/2024                   Associated Diagnoses       M47.816 Lumbar spondylosis       Procedure -> Medial Branch Block (Specify level and laterality) Cmt:                 bilateral L4/5 and L5/S1 facets          Physician -> Isaias          Is patient on anti-coagulants? Cmt: dont stop any meds     
[TextBox_4] : Ms. CHE is a 30 year old female with a history of gestational diabetes, gestational HTN, IBS, allergic asthma , COVID-19 (last 9/2022), frequent PNA/bronchitis who now comes to the office for an initial pulmonary evaluation for SOB, active severe persistent asthma, allergies/PND, ?immunodeficiency, LPR, ?PATO. Her chief complaint is  -she notes she started having asthma Sx since 2014 -she usually gets PNA 2-3X per year -she notes her child has gotten RSV twice, which she believes is causing her current Sx -she notes she has been feeling unwell since 1 month ago, after going to a friend's house for Thanksgiving, where her child got sick -she notes she completed a course of doxycycline 5 days ago. Her breathing has improved by 30%. She was also on oral steroids, which she finished 1 day ago -she notes she uses the albuterol nebulizer, DuoNeb, and albuterol inhaler. She started using DuoNeb 1 week ago -she notes her asthma Sx are triggered by laughing, URIs, the cold air, cat dander, and exercise -she notes her asthma Sx: wheezing, painful breathing, coughing, SOB -she notes she has allergies to cat dander and she had an anaphylactic reaction 3 times to a fertilizer in 2004 -she notes she has difficulty sleeping  -she notes chronic PND -she notes having a globus sensation at night when she lies supine, all night long -she notes she wakes up at night choking and gasping for air -she notes waking up fatigued every day -she notes weight is stable  -she notes ability to fall asleep while watching TV and while in a car  -she notes she did bloodwork 10/2023 for IVF  -she denies any headaches, nausea, emesis, fever, chills, sweats, chest pain, chest pressure, palpitations, constipation, diarrhea, vertigo, dysphagia, heartburn, itchy eyes, itchy ears, leg swelling, leg pain, arthralgias, myalgias, or sour taste in the mouth.

## 2024-12-05 ENCOUNTER — TELEPHONE (OUTPATIENT)
Dept: PAIN MEDICINE | Facility: CLINIC | Age: 54
End: 2024-12-05
Payer: MEDICARE

## 2024-12-05 NOTE — TELEPHONE ENCOUNTER
----- Message from Nurse Hough sent at 12/5/2024 10:42 AM CST -----  Regarding: Levels  Good morning,   There is not a description as to if this for unilateral or bilateral for the levels.  I need to information in order to complete the authorization.   Thank you,   France Montalvo LPN  [facility] Pre Service

## 2024-12-05 NOTE — TELEPHONE ENCOUNTER
Im so sorry I am training someone and I did not check this case when she ordered it.  I will add the information to the case right now.  The laterality was there it is bilateral. The levels are now added.

## 2024-12-20 ENCOUNTER — HOSPITAL ENCOUNTER (OUTPATIENT)
Facility: HOSPITAL | Age: 54
Discharge: HOME | End: 2024-12-20
Attending: STUDENT IN AN ORGANIZED HEALTH CARE EDUCATION/TRAINING PROGRAM | Admitting: STUDENT IN AN ORGANIZED HEALTH CARE EDUCATION/TRAINING PROGRAM
Payer: MEDICARE

## 2024-12-20 DIAGNOSIS — M47.896 OTHER SPONDYLOSIS, LUMBAR REGION: ICD-10-CM

## 2024-12-20 PROCEDURE — 63600175 PHARM REV CODE 636 W HCPCS: Performed by: STUDENT IN AN ORGANIZED HEALTH CARE EDUCATION/TRAINING PROGRAM

## 2024-12-20 PROCEDURE — 64494 INJ PARAVERT F JNT L/S 2 LEV: CPT | Mod: 50 | Performed by: STUDENT IN AN ORGANIZED HEALTH CARE EDUCATION/TRAINING PROGRAM

## 2024-12-20 PROCEDURE — 25500020 PHARM REV CODE 255: Performed by: STUDENT IN AN ORGANIZED HEALTH CARE EDUCATION/TRAINING PROGRAM

## 2024-12-20 PROCEDURE — 64493 INJ PARAVERT F JNT L/S 1 LEV: CPT | Mod: 50 | Performed by: STUDENT IN AN ORGANIZED HEALTH CARE EDUCATION/TRAINING PROGRAM

## 2024-12-20 PROCEDURE — 64494 INJ PARAVERT F JNT L/S 2 LEV: CPT | Mod: 50,,, | Performed by: STUDENT IN AN ORGANIZED HEALTH CARE EDUCATION/TRAINING PROGRAM

## 2024-12-20 PROCEDURE — 25000003 PHARM REV CODE 250: Performed by: STUDENT IN AN ORGANIZED HEALTH CARE EDUCATION/TRAINING PROGRAM

## 2024-12-20 PROCEDURE — 64493 INJ PARAVERT F JNT L/S 1 LEV: CPT | Mod: 50,,, | Performed by: STUDENT IN AN ORGANIZED HEALTH CARE EDUCATION/TRAINING PROGRAM

## 2024-12-20 RX ORDER — LIDOCAINE HYDROCHLORIDE 20 MG/ML
INJECTION, SOLUTION EPIDURAL; INFILTRATION; INTRACAUDAL; PERINEURAL
Status: DISCONTINUED | OUTPATIENT
Start: 2024-12-20 | End: 2024-12-20 | Stop reason: HOSPADM

## 2024-12-20 RX ORDER — LIDOCAINE HYDROCHLORIDE 10 MG/ML
1 INJECTION, SOLUTION EPIDURAL; INFILTRATION; INTRACAUDAL; PERINEURAL ONCE
Status: DISCONTINUED | OUTPATIENT
Start: 2024-12-20 | End: 2024-12-20 | Stop reason: HOSPADM

## 2024-12-20 RX ORDER — SODIUM CHLORIDE, SODIUM LACTATE, POTASSIUM CHLORIDE, CALCIUM CHLORIDE 600; 310; 30; 20 MG/100ML; MG/100ML; MG/100ML; MG/100ML
INJECTION, SOLUTION INTRAVENOUS CONTINUOUS
Status: DISCONTINUED | OUTPATIENT
Start: 2024-12-20 | End: 2024-12-20 | Stop reason: HOSPADM

## 2024-12-20 RX ORDER — LIDOCAINE HYDROCHLORIDE 10 MG/ML
INJECTION, SOLUTION EPIDURAL; INFILTRATION; INTRACAUDAL; PERINEURAL
Status: DISCONTINUED | OUTPATIENT
Start: 2024-12-20 | End: 2024-12-20 | Stop reason: HOSPADM

## 2024-12-20 RX ORDER — ALPRAZOLAM 0.25 MG/1
1 TABLET, ORALLY DISINTEGRATING ORAL ONCE
Status: COMPLETED | OUTPATIENT
Start: 2024-12-20 | End: 2024-12-20

## 2024-12-20 RX ADMIN — ALPRAZOLAM 1 MG: 0.25 TABLET, ORALLY DISINTEGRATING ORAL at 01:12

## 2024-12-20 NOTE — DISCHARGE SUMMARY
[facility]  Discharge Note  Short Stay     Admit Date: 12/20/2024    Discharge Date: 12/20/2024     Attending Physician: Luciano Bradshaw MD    Diagnoses:  Lumbar spondylosis    Discharged Condition: Good     Hospital Course: Patient was admitted for an outpatient interventional pain management procedure and tolerated the procedure well with no complications.     Final Diagnoses: Same as principal problem.     Disposition: Home or Self Care     Follow up/Patient Instructions:   Follow-up in 4 weeks unless otherwise instructed. May return sooner as needed.       Reconciled Medications:     Medication List        CONTINUE taking these medications      ARIPiprazole 10 MG Tab  Commonly known as: ABILIFY  Take 1 tablet (10 mg total) by mouth once daily.     atorvastatin 20 MG tablet  Commonly known as: LIPITOR  Take 20 mg by mouth once daily.     celecoxib 100 MG capsule  Commonly known as: CeleBREX  Take 1 capsule by mouth twice daily     EScitalopram oxalate 10 MG tablet  Commonly known as: LEXAPRO  Take 1 tablet (10 mg total) by mouth once daily.     hydroCHLOROthiazide 25 MG tablet  Commonly known as: HYDRODIURIL  Take 1 tablet (25 mg total) by mouth once daily.     HYDROcodone-acetaminophen 5-325 mg per tablet  Commonly known as: NORCO  Take 1 tablet by mouth every 6 (six) hours as needed for Pain.     levothyroxine 88 MCG tablet  Commonly known as: SYNTHROID  Take 1 tablet (88 mcg total) by mouth before breakfast.     mirtazapine 15 MG tablet  Commonly known as: REMERON  Take 1 tablet (15 mg total) by mouth every evening.            ASK your doctor about these medications      diazePAM 5 MG tablet  Commonly known as: VALIUM  Take 1-2 tablets 15-30 minutes prior to MRI     MYRBETRIQ 50 mg Tb24  Generic drug: mirabegron  TAKE 1 TABLET(50 MG) BY MOUTH EVERY DAY             Discharge Procedure Orders (must include Diet, Follow-up, Activity)   Ice to affected area   Order Comments: 20 minutes of ice or  until area numb to the touch if area is sore 2-3 times per day as needed     No driving until:   Order Comments: Until following day     No dressing needed     Notify your health care provider if you experience any of the following:  temperature >100.4     Notify your health care provider if you experience any of the following:  persistent nausea and vomiting or diarrhea     Notify your health care provider if you experience any of the following:  severe uncontrolled pain     Notify your health care provider if you experience any of the following:  redness, tenderness, or signs of infection (pain, swelling, redness, odor or green/yellow discharge around incision site)     Notify your health care provider if you experience any of the following:  difficulty breathing or increased cough     Notify your health care provider if you experience any of the following:  severe persistent headache     Notify your health care provider if you experience any of the following:  worsening rash     Notify your health care provider if you experience any of the following:  persistent dizziness, light-headedness, or visual disturbances     Notify your health care provider if you experience any of the following:  increased confusion or weakness     Shower on day dressing removed (No bath)       Luciano Bradshaw MD  Interventional Pain Medicine / Physical Medicine & Rehabilitation

## 2024-12-20 NOTE — INTERVAL H&P NOTE
The patient has been examined and the H&P has been reviewed:    I concur with the findings and no changes have occurred since H&P was written.    Procedure risks, benefits and alternative options discussed and understood by patient/family.    RRR  CTABL      There are no hospital problems to display for this patient.

## 2024-12-20 NOTE — OP NOTE
Diagnostic Lumbar Medial Branch Block Under Fluoroscopy    The procedure, risks, benefits, and options were discussed with the patient. There are no contraindications to the procedure. The patent expressed understanding and agreed to the procedure. Informed written consent was obtained prior to the start of the procedure and can be found in the patient's chart.    PATIENT NAME: Sabrina Black   MRN: [id number]     DATE OF PROCEDURE: 12/20/2024                                           PROCEDURE:  Diagnostic Bilateral L3, L4, and L5 Lumbar Medial Branch Block under Fluoroscopy    PRE-OP DIAGNOSIS: Lumbar spondylosis [M47.816] Lumbar spondylosis [M47.816]    POST-OP DIAGNOSIS: Same    PHYSICIAN: Luciano Bradshaw MD    ASSISTANTS: none    MEDICATIONS INJECTED:  Lidocaine 2%    LOCAL ANESTHETIC INJECTED:   none    SEDATION: 1mg PO xanax    ESTIMATED BLOOD LOSS:  None    COMPLICATIONS:  None.    INTERVAL HISTORY: Patient has clinical and imaging findings suggestive of facet mediated pain.    TECHNIQUE: Time-out was performed to identify the patient and procedure to be performed. With the patient laying in a prone position, the surgical area was prepped and draped in the usual sterile fashion using ChloraPrep and fenestrated drape. The levels were determined under fluoroscopic guidance. Skin anesthesia was achieved by injecting Lidocaine 2% over the injection sites. A 25 gauge, 3.5 inch needle was introduced into the medial branch nerves at the junctions of the superior articular process and the transverse processes of the targeted sites using AP, lateral and/or contralateral oblique fluoroscopic imaging. After negative aspiration for blood or CSF was confirmed, 0.5 mL of the anesthetic listed above was then slowly injected at each site. The needles were removed and bleeding was nil. A sterile dressing was applied. No specimens collected. The patient tolerated the procedure well.    The patient was monitored after the  procedure in the recovery area. They were given post-procedure and discharge instructions to follow at home. The patient was discharged in a stable condition.    Luciano Bradshaw MD

## 2024-12-23 ENCOUNTER — TELEPHONE (OUTPATIENT)
Dept: PAIN MEDICINE | Facility: CLINIC | Age: 54
End: 2024-12-23
Payer: MEDICARE

## 2024-12-23 VITALS
BODY MASS INDEX: 27.26 KG/M2 | HEIGHT: 68 IN | SYSTOLIC BLOOD PRESSURE: 156 MMHG | RESPIRATION RATE: 16 BRPM | DIASTOLIC BLOOD PRESSURE: 93 MMHG | OXYGEN SATURATION: 100 % | HEART RATE: 74 BPM | WEIGHT: 179.88 LBS | TEMPERATURE: 98 F

## 2024-12-23 DIAGNOSIS — M47.816 LUMBAR SPONDYLOSIS: Primary | ICD-10-CM

## 2024-12-23 LAB — POCT GLUCOSE: 82 MG/DL (ref 70–110)

## 2024-12-30 NOTE — TELEPHONE ENCOUNTER
Spoke with pt and she states that she had 90% relief x several hours her starting pain score was an 8 and after the procedure a 1. Scheduled for 01/17 for 2nd block

## 2025-01-09 ENCOUNTER — TELEPHONE (OUTPATIENT)
Dept: PAIN MEDICINE | Facility: CLINIC | Age: 55
End: 2025-01-09
Payer: MEDICAID

## 2025-01-09 DIAGNOSIS — G89.29 CHRONIC BILATERAL LOW BACK PAIN WITH BILATERAL SCIATICA: ICD-10-CM

## 2025-01-09 DIAGNOSIS — M54.41 CHRONIC BILATERAL LOW BACK PAIN WITH BILATERAL SCIATICA: ICD-10-CM

## 2025-01-09 DIAGNOSIS — M54.42 CHRONIC BILATERAL LOW BACK PAIN WITH BILATERAL SCIATICA: ICD-10-CM

## 2025-01-09 RX ORDER — CELECOXIB 100 MG/1
100 CAPSULE ORAL 2 TIMES DAILY
Qty: 60 CAPSULE | Refills: 0 | Status: SHIPPED | OUTPATIENT
Start: 2025-01-09

## 2025-01-09 RX ORDER — HYDROCODONE BITARTRATE AND ACETAMINOPHEN 5; 325 MG/1; MG/1
1 TABLET ORAL EVERY 6 HOURS PRN
Qty: 28 TABLET | Refills: 0 | Status: SHIPPED | OUTPATIENT
Start: 2025-01-09

## 2025-01-09 NOTE — TELEPHONE ENCOUNTER
Spoke with pt and advised that she will have to call us on 02/01 to  update new insurance and then re run and reschedule her procedure she is out of network until then. In the meantime she has asked for a refill of pain meds until she can get her procedure. Please advise

## 2025-01-09 NOTE — PROGRESS NOTES
Patient changing insurances which will delay her procedure. Providing refills of pain medications to bridge the gap.     Luciano Bradshaw MD  Interventional Pain

## 2025-01-09 NOTE — TELEPHONE ENCOUNTER
Amairani Hernandez MA Cromer, Tiffany Ann, LPN         Previous Messages       ----- Message -----  From: Leslye Daigle  Sent: 1/8/2025   4:15 PM CST  To: Leeann Wolf Staff  Subject: sx 01/17/2025                                    I am reaching out about pt sx on 01/17/2025 . Patient insurance [employer] is not accepted At [facility]. I have reached out to patient, she stated,she was  enrolled in [employer] in error  and will be switching to  [employer] that will go into effect in February. She will reach out to [employer] to see if it can be changed sooner    Thank you,  Leslye Daigle  Pre Service       Will call pt after 8 am. I cannot reschedule her until her new insurance is uploaded and active.

## 2025-01-24 ENCOUNTER — TELEPHONE (OUTPATIENT)
Dept: OPTOMETRY | Facility: CLINIC | Age: 55
End: 2025-01-24
Payer: MEDICAID

## 2025-01-24 NOTE — TELEPHONE ENCOUNTER
Returned pt phone call, told her we do not take [employer]. Also informed pt that we did her contacts back in September and she did not report back about trials to get rx finalized. Out of enzo period. Pt says she didn't know she was supposed to report back about how the CL were working for her. Re-explained its been almost 4 months since trials were given and theres no rx finalized. Got pt back on schedule.

## 2025-02-06 ENCOUNTER — TELEPHONE (OUTPATIENT)
Dept: PAIN MEDICINE | Facility: CLINIC | Age: 55
End: 2025-02-06
Payer: MEDICARE

## 2025-02-06 NOTE — TELEPHONE ENCOUNTER
----- Message from Ambreen sent at 2/6/2025  1:43 PM CST -----  Contact: self  Type: Needs Medical Advice  Who Called:  the patient     Best Call Back Number: [phone]  Additional Information: pt calling to speak with , can he call her back regarding her appt time on

## 2025-02-06 NOTE — TELEPHONE ENCOUNTER
Explained to pt yesterday that she would be called with time 2 days prior to procedure. The provider not myself has access to the time.

## 2025-02-21 ENCOUNTER — HOSPITAL ENCOUNTER (OUTPATIENT)
Facility: HOSPITAL | Age: 55
Discharge: HOME | End: 2025-02-21
Attending: STUDENT IN AN ORGANIZED HEALTH CARE EDUCATION/TRAINING PROGRAM | Admitting: STUDENT IN AN ORGANIZED HEALTH CARE EDUCATION/TRAINING PROGRAM
Payer: MEDICARE

## 2025-02-21 DIAGNOSIS — M47.896 OTHER SPONDYLOSIS, LUMBAR REGION: ICD-10-CM

## 2025-02-21 PROCEDURE — 25500020 PHARM REV CODE 255: Performed by: STUDENT IN AN ORGANIZED HEALTH CARE EDUCATION/TRAINING PROGRAM

## 2025-02-21 PROCEDURE — 64493 INJ PARAVERT F JNT L/S 1 LEV: CPT | Mod: 50 | Performed by: STUDENT IN AN ORGANIZED HEALTH CARE EDUCATION/TRAINING PROGRAM

## 2025-02-21 PROCEDURE — 63600175 PHARM REV CODE 636 W HCPCS: Performed by: STUDENT IN AN ORGANIZED HEALTH CARE EDUCATION/TRAINING PROGRAM

## 2025-02-21 PROCEDURE — 25000003 PHARM REV CODE 250: Performed by: STUDENT IN AN ORGANIZED HEALTH CARE EDUCATION/TRAINING PROGRAM

## 2025-02-21 PROCEDURE — 64493 INJ PARAVERT F JNT L/S 1 LEV: CPT | Mod: 50,KX,, | Performed by: STUDENT IN AN ORGANIZED HEALTH CARE EDUCATION/TRAINING PROGRAM

## 2025-02-21 PROCEDURE — 64494 INJ PARAVERT F JNT L/S 2 LEV: CPT | Mod: 50,KX,, | Performed by: STUDENT IN AN ORGANIZED HEALTH CARE EDUCATION/TRAINING PROGRAM

## 2025-02-21 PROCEDURE — 64494 INJ PARAVERT F JNT L/S 2 LEV: CPT | Mod: 50 | Performed by: STUDENT IN AN ORGANIZED HEALTH CARE EDUCATION/TRAINING PROGRAM

## 2025-02-21 RX ORDER — LIDOCAINE HYDROCHLORIDE 20 MG/ML
INJECTION, SOLUTION EPIDURAL; INFILTRATION; INTRACAUDAL; PERINEURAL
Status: DISCONTINUED | OUTPATIENT
Start: 2025-02-21 | End: 2025-02-21 | Stop reason: HOSPADM

## 2025-02-21 RX ORDER — LIDOCAINE HYDROCHLORIDE 10 MG/ML
1 INJECTION, SOLUTION EPIDURAL; INFILTRATION; INTRACAUDAL; PERINEURAL ONCE
Status: DISCONTINUED | OUTPATIENT
Start: 2025-02-21 | End: 2025-02-21

## 2025-02-21 RX ORDER — BUPIVACAINE HYDROCHLORIDE 2.5 MG/ML
INJECTION, SOLUTION EPIDURAL; INFILTRATION; INTRACAUDAL
Status: DISCONTINUED | OUTPATIENT
Start: 2025-02-21 | End: 2025-02-21 | Stop reason: HOSPADM

## 2025-02-21 RX ORDER — SODIUM CHLORIDE, SODIUM LACTATE, POTASSIUM CHLORIDE, CALCIUM CHLORIDE 600; 310; 30; 20 MG/100ML; MG/100ML; MG/100ML; MG/100ML
INJECTION, SOLUTION INTRAVENOUS CONTINUOUS
Status: DISCONTINUED | OUTPATIENT
Start: 2025-02-21 | End: 2025-02-21

## 2025-02-21 RX ORDER — ALPRAZOLAM 1 MG/1
1 TABLET, ORALLY DISINTEGRATING ORAL ONCE AS NEEDED
Status: COMPLETED | OUTPATIENT
Start: 2025-02-21 | End: 2025-02-21

## 2025-02-21 RX ADMIN — ALPRAZOLAM 1 MG: 1 TABLET, ORALLY DISINTEGRATING ORAL at 01:02

## 2025-02-21 NOTE — PLAN OF CARE
Patient is being driven home by her parents. Her pain diary was reviewed and sent home with her. Patient was able to walk to the bathroom and the waiting room. She declined anything to drink.

## 2025-02-21 NOTE — DISCHARGE SUMMARY
[facility]  Discharge Note  Short Stay     Admit Date: 2/21/2025    Discharge Date: 2/21/2025     Attending Physician: Luciano Bradshaw MD    Diagnoses:  Lumbar spondylosis    Discharged Condition: Good     Hospital Course: Patient was admitted for an outpatient interventional pain management procedure and tolerated the procedure well with no complications.     Final Diagnoses: Same as principal problem.     Disposition: Home or Self Care     Follow up/Patient Instructions:   Follow-up in 4 weeks unless otherwise instructed. May return sooner as needed.       Reconciled Medications:     Medication List        CONTINUE taking these medications      ARIPiprazole 10 MG Tab  Commonly known as: ABILIFY  Take 1 tablet (10 mg total) by mouth once daily.     atorvastatin 20 MG tablet  Commonly known as: LIPITOR  Take 20 mg by mouth once daily.     celecoxib 100 MG capsule  Commonly known as: CeleBREX  Take 1 capsule (100 mg total) by mouth 2 (two) times daily.     EScitalopram oxalate 10 MG tablet  Commonly known as: LEXAPRO  Take 1 tablet (10 mg total) by mouth once daily.     hydroCHLOROthiazide 25 MG tablet  Commonly known as: HYDRODIURIL  Take 1 tablet (25 mg total) by mouth once daily.     HYDROcodone-acetaminophen 5-325 mg per tablet  Commonly known as: NORCO  Take 1 tablet by mouth every 6 (six) hours as needed for Pain.     levothyroxine 88 MCG tablet  Commonly known as: SYNTHROID  Take 1 tablet (88 mcg total) by mouth before breakfast.     mirtazapine 15 MG tablet  Commonly known as: REMERON  Take 1 tablet (15 mg total) by mouth every evening.            ASK your doctor about these medications      MYRBETRIQ 50 mg Tb24  Generic drug: mirabegron  TAKE 1 TABLET(50 MG) BY MOUTH EVERY DAY             Discharge Procedure Orders (must include Diet, Follow-up, Activity)   Ice to affected area   Order Comments: 20 minutes of ice or until area numb to the touch if area is sore 2-3 times per day as needed      No driving until:   Order Comments: Until following day     No dressing needed     Notify your health care provider if you experience any of the following:  temperature >100.4     Notify your health care provider if you experience any of the following:  persistent nausea and vomiting or diarrhea     Notify your health care provider if you experience any of the following:  severe uncontrolled pain     Notify your health care provider if you experience any of the following:  redness, tenderness, or signs of infection (pain, swelling, redness, odor or green/yellow discharge around incision site)     Notify your health care provider if you experience any of the following:  difficulty breathing or increased cough     Notify your health care provider if you experience any of the following:  severe persistent headache     Notify your health care provider if you experience any of the following:  worsening rash     Notify your health care provider if you experience any of the following:  persistent dizziness, light-headedness, or visual disturbances     Notify your health care provider if you experience any of the following:  increased confusion or weakness     Shower on day dressing removed (No bath)       Luciano Bradshaw MD  Interventional Pain Medicine / Physical Medicine & Rehabilitation

## 2025-02-21 NOTE — OP NOTE
Diagnostic Lumbar Medial Branch Block Under Fluoroscopy    The procedure, risks, benefits, and options were discussed with the patient. There are no contraindications to the procedure. The patent expressed understanding and agreed to the procedure. Informed written consent was obtained prior to the start of the procedure and can be found in the patient's chart.    PATIENT NAME: Sabrina Black   MRN: [id number]     DATE OF PROCEDURE: 02/21/2025                                           PROCEDURE:  Diagnostic Bilateral L3, L4, and L5 Lumbar Medial Branch Block under Fluoroscopy    PRE-OP DIAGNOSIS: Lumbar spondylosis [M47.816] Lumbar spondylosis [M47.816]    POST-OP DIAGNOSIS: Same    PHYSICIAN: Luciano Bradshaw MD    ASSISTANTS: none    MEDICATIONS INJECTED:  Bupivicaine 0.25%    SEDATION: PO Xanax 1mg    ESTIMATED BLOOD LOSS:  None    COMPLICATIONS:  None.    INTERVAL HISTORY: Patient has clinical and imaging findings suggestive of facet mediated pain. Patient had a previous diagnostic block performed with at least 80% relief in pain and/or at least 50% improvement in the ability to perform previously painful movements and ADLs for the expected duration of the local anesthetic utilized.    TECHNIQUE: Time-out was performed to identify the patient and procedure to be performed. With the patient laying in a prone position, the surgical area was prepped and draped in the usual sterile fashion using ChloraPrep and fenestrated drape. The levels were determined under fluoroscopic guidance. A 25 gauge, 3.5 inch needle was introduced into the medial branch nerves at the junctions of the superior articular process and the transverse processes of the targeted sites using AP, lateral and/or contralateral oblique fluoroscopic imaging. After negative aspiration for blood or CSF was confirmed, 0.5 mL of the anesthetic listed above was then slowly injected at each site. The needles were removed and bleeding was nil. A sterile  dressing was applied. No specimens collected. The patient tolerated the procedure well.    The patient was monitored after the procedure in the recovery area. They were given post-procedure and discharge instructions to follow at home. The patient was discharged in a stable condition.    Luciano Bradshaw MD ;

## 2025-02-21 NOTE — H&P
"HPI  Patient presenting for Procedure(s) (LRB):  Block-nerve-medial branch-lumbar (Bilateral)     Patient on Anti-coagulation No    No health changes since previous encounter. No changes in pain since procedure was scheduled at previous visit. Denies any fevers or infections. Denies any issues with clotting or bleeding.     Facility-Administered Medications as of 2/21/2025   Medication Dose Route Frequency Provider Last Rate Last Admin    [COMPLETED] ALPRAZolam dissolvable tablet 1 mg  1 mg Oral Once PRN Luciano Bradshaw MD   1 mg at 02/21/25 1312    BUPivacaine (PF) 0.25% (2.5 mg/ml) injection    PRN Luciano Bradshaw MD   5 mL at 02/21/25 1308    iohexoL (OMNIPAQUE 300) injection    PRN Luciano Bradshaw MD   5 mL at 02/21/25 1308    LIDOcaine (PF) 20 mg/ml (2%) injection    PRN Luciano Bradshaw MD   5 mL at 02/21/25 1307     Outpatient Medications as of 2/21/2025   Medication Sig Dispense Refill    atorvastatin (LIPITOR) 20 MG tablet Take 20 mg by mouth once daily.       celecoxib (CELEBREX) 100 MG capsule Take 1 capsule (100 mg total) by mouth 2 (two) times daily. 60 capsule 0    HYDROcodone-acetaminophen (NORCO) 5-325 mg per tablet Take 1 tablet by mouth every 6 (six) hours as needed for Pain. 28 tablet 0    MYRBETRIQ 50 mg Tb24 TAKE 1 TABLET(50 MG) BY MOUTH EVERY DAY (Patient not taking: Reported on 10/15/2024) 90 tablet 1     Past Medical History:   Diagnosis Date    Bipolar 1 disorder 2011    Diabetes mellitus, type 2     pt states "no longer diabetic since I lost 100lbs"    Hyperlipidemia 2017    Hypertension     Knee injury 2011    Lumbar pain 1994    herniated disc     Parkinson's disease 2016    Thyroid disease 2014     Past Surgical History:   Procedure Laterality Date    ABLATION  1999    CARPAL TUNNEL RELEASE Right 12/11/2019    Procedure: RELEASE, CARPAL TUNNEL;  Surgeon: Alfredo Blanc MD;  Location: [facility];  Service: Orthopedics;  Laterality: Right;    CAUDAL EPIDURAL " "STEROID INJECTION N/A 10/25/2024    Procedure: Injection-steroid-epidural-caudal;  Surgeon: Luciano Bradshaw MD;  Location: [facility] OR;  Service: Pain Management;  Laterality: N/A;    CHOLECYSTECTOMY  08/27/2018    Dr. Petersen     COLONOSCOPY N/A 10/13/2022    Procedure: COLONOSCOPY;  Surgeon: Kelechi Sheehan MD;  Location: [facility] ENDO;  Service: Endoscopy;  Laterality: N/A;    INJECTION OF ANESTHETIC AGENT AROUND MEDIAL BRANCH NERVES INNERVATING LUMBAR FACET JOINT Bilateral 12/20/2024    Procedure: Block-nerve-medial branch-lumbar;  Surgeon: Luciano Bradshaw MD;  Location: [facility] OR;  Service: Pain Management;  Laterality: Bilateral;    knee and back surgery  1994    Back surgery 1994    KNEE SURGERY Right 06/2019    LAPAROSCOPIC GASTRIC BANDING  2014    Gastric sleeve    NECK SURGERY  2004    neck     REMOVAL OF BONE SPUR OF FOOT Right 2010    TOTAL KNEE ARTHROPLASTY Right 06/05/2019     Review of patient's allergies indicates:   Allergen Reactions    Pcn [penicillins] Hives    Morphine Hallucinations    Tizanidine Other (See Comments)     Mouth sores    Gabapentin Nausea Only      Current Facility-Administered Medications   Medication    BUPivacaine (PF) 0.25% (2.5 mg/ml) injection    iohexoL (OMNIPAQUE 300) injection    LIDOcaine (PF) 20 mg/ml (2%) injection       PMHx, PSHx, Allergies, Medications reviewed in epic    ROS negative except pain complaints in HPI    OBJECTIVE:    /79 (BP Location: Left arm, Patient Position: Sitting)   Pulse 78   Temp 98.1 °F (36.7 °C) (Skin)   Resp 17   Ht 5' 8" (1.727 m)   Wt 81.6 kg (179 lb 14.3 oz)   LMP 12/01/2014   SpO2 99%   Breastfeeding No   BMI 27.35 kg/m²     PHYSICAL EXAMINATION:    GENERAL: Well appearing, in no acute distress, alert and oriented.  PSYCH:  Mood and affect appropriate.  SKIN: Skin color, texture, turgor normal in procedure area, no rashes or lesions which will impact the procedure.  CV: RRR with palpation of the radial " artery.  PULM: No evidence of respiratory difficulty, symmetric chest rise. Clear to auscultation.  NEURO: Alert. Oriented. Speech fluent and appropriate. Moving all extremities.    Plan:    Proceed with procedure as planned Procedure(s) (LRB):  Block-nerve-medial branch-lumbar (Bilateral)    Luciano Bradshaw  02/21/2025

## 2025-02-24 VITALS
BODY MASS INDEX: 27.26 KG/M2 | DIASTOLIC BLOOD PRESSURE: 61 MMHG | HEART RATE: 73 BPM | HEIGHT: 68 IN | WEIGHT: 179.88 LBS | OXYGEN SATURATION: 95 % | TEMPERATURE: 98 F | RESPIRATION RATE: 18 BRPM | SYSTOLIC BLOOD PRESSURE: 121 MMHG

## 2025-02-24 DIAGNOSIS — M54.41 CHRONIC BILATERAL LOW BACK PAIN WITH BILATERAL SCIATICA: ICD-10-CM

## 2025-02-24 DIAGNOSIS — M47.896 OTHER SPONDYLOSIS, LUMBAR REGION: ICD-10-CM

## 2025-02-24 DIAGNOSIS — G89.29 CHRONIC BILATERAL LOW BACK PAIN WITH BILATERAL SCIATICA: Primary | ICD-10-CM

## 2025-02-24 DIAGNOSIS — M54.41 CHRONIC BILATERAL LOW BACK PAIN WITH BILATERAL SCIATICA: Primary | ICD-10-CM

## 2025-02-24 DIAGNOSIS — G89.29 CHRONIC BILATERAL LOW BACK PAIN WITH BILATERAL SCIATICA: ICD-10-CM

## 2025-02-24 DIAGNOSIS — M54.42 CHRONIC BILATERAL LOW BACK PAIN WITH BILATERAL SCIATICA: ICD-10-CM

## 2025-02-24 DIAGNOSIS — M54.42 CHRONIC BILATERAL LOW BACK PAIN WITH BILATERAL SCIATICA: Primary | ICD-10-CM

## 2025-02-24 RX ORDER — TRAMADOL HYDROCHLORIDE 50 MG/1
50 TABLET ORAL EVERY 12 HOURS PRN
Qty: 14 TABLET | Refills: 0 | Status: SHIPPED | OUTPATIENT
Start: 2025-02-24 | End: 2026-02-24

## 2025-02-24 RX ORDER — HYDROCODONE BITARTRATE AND ACETAMINOPHEN 5; 325 MG/1; MG/1
1 TABLET ORAL EVERY 6 HOURS PRN
Qty: 28 TABLET | Refills: 0 | OUTPATIENT
Start: 2025-02-24

## 2025-02-24 RX ORDER — TRAMADOL HYDROCHLORIDE 50 MG/1
50 TABLET ORAL EVERY 12 HOURS PRN
Qty: 14 TABLET | Refills: 0 | Status: SHIPPED | OUTPATIENT
Start: 2025-02-24 | End: 2025-02-24

## 2025-02-24 RX ORDER — CELECOXIB 100 MG/1
100 CAPSULE ORAL 2 TIMES DAILY
Qty: 60 CAPSULE | Refills: 0 | Status: SHIPPED | OUTPATIENT
Start: 2025-02-24

## 2025-02-24 NOTE — TELEPHONE ENCOUNTER
Pt had 100% relief from MBB #2 x  several hours her starting pain score was 8 ending pain score 0 and now pain score is back to an 8 scheduled for RFA 03/14.

## 2025-03-14 ENCOUNTER — HOSPITAL ENCOUNTER (OUTPATIENT)
Facility: HOSPITAL | Age: 55
Discharge: HOME | End: 2025-03-14
Attending: STUDENT IN AN ORGANIZED HEALTH CARE EDUCATION/TRAINING PROGRAM | Admitting: STUDENT IN AN ORGANIZED HEALTH CARE EDUCATION/TRAINING PROGRAM
Payer: MEDICARE

## 2025-03-14 DIAGNOSIS — M47.896 OTHER SPONDYLOSIS, LUMBAR REGION: ICD-10-CM

## 2025-03-14 LAB — POCT GLUCOSE: 77 MG/DL (ref 70–110)

## 2025-03-14 PROCEDURE — 99153 MOD SED SAME PHYS/QHP EA: CPT | Performed by: STUDENT IN AN ORGANIZED HEALTH CARE EDUCATION/TRAINING PROGRAM

## 2025-03-14 PROCEDURE — 64635 DESTROY LUMB/SAC FACET JNT: CPT | Mod: 50 | Performed by: STUDENT IN AN ORGANIZED HEALTH CARE EDUCATION/TRAINING PROGRAM

## 2025-03-14 PROCEDURE — 63600175 PHARM REV CODE 636 W HCPCS: Performed by: STUDENT IN AN ORGANIZED HEALTH CARE EDUCATION/TRAINING PROGRAM

## 2025-03-14 PROCEDURE — 99152 MOD SED SAME PHYS/QHP 5/>YRS: CPT | Performed by: STUDENT IN AN ORGANIZED HEALTH CARE EDUCATION/TRAINING PROGRAM

## 2025-03-14 PROCEDURE — 64636 DESTROY L/S FACET JNT ADDL: CPT | Mod: 50 | Performed by: STUDENT IN AN ORGANIZED HEALTH CARE EDUCATION/TRAINING PROGRAM

## 2025-03-14 PROCEDURE — 64635 DESTROY LUMB/SAC FACET JNT: CPT | Mod: 50,,, | Performed by: STUDENT IN AN ORGANIZED HEALTH CARE EDUCATION/TRAINING PROGRAM

## 2025-03-14 PROCEDURE — 64636 DESTROY L/S FACET JNT ADDL: CPT | Mod: 50,,, | Performed by: STUDENT IN AN ORGANIZED HEALTH CARE EDUCATION/TRAINING PROGRAM

## 2025-03-14 RX ORDER — SODIUM CHLORIDE, SODIUM LACTATE, POTASSIUM CHLORIDE, CALCIUM CHLORIDE 600; 310; 30; 20 MG/100ML; MG/100ML; MG/100ML; MG/100ML
INJECTION, SOLUTION INTRAVENOUS CONTINUOUS
Status: DISCONTINUED | OUTPATIENT
Start: 2025-03-14 | End: 2025-03-14 | Stop reason: HOSPADM

## 2025-03-14 RX ORDER — LIDOCAINE HYDROCHLORIDE 20 MG/ML
INJECTION, SOLUTION EPIDURAL; INFILTRATION; INTRACAUDAL; PERINEURAL
Status: DISCONTINUED | OUTPATIENT
Start: 2025-03-14 | End: 2025-03-14 | Stop reason: HOSPADM

## 2025-03-14 RX ORDER — BUPIVACAINE HYDROCHLORIDE 2.5 MG/ML
INJECTION, SOLUTION EPIDURAL; INFILTRATION; INTRACAUDAL; PERINEURAL
Status: DISCONTINUED | OUTPATIENT
Start: 2025-03-14 | End: 2025-03-14 | Stop reason: HOSPADM

## 2025-03-14 RX ORDER — METHYLPREDNISOLONE ACETATE 80 MG/ML
INJECTION, SUSPENSION INTRA-ARTICULAR; INTRALESIONAL; INTRAMUSCULAR; SOFT TISSUE
Status: DISCONTINUED | OUTPATIENT
Start: 2025-03-14 | End: 2025-03-14 | Stop reason: HOSPADM

## 2025-03-14 RX ORDER — FENTANYL CITRATE 50 UG/ML
INJECTION, SOLUTION INTRAMUSCULAR; INTRAVENOUS
Status: DISCONTINUED | OUTPATIENT
Start: 2025-03-14 | End: 2025-03-14 | Stop reason: HOSPADM

## 2025-03-14 RX ORDER — LIDOCAINE HYDROCHLORIDE 10 MG/ML
1 INJECTION, SOLUTION EPIDURAL; INFILTRATION; INTRACAUDAL; PERINEURAL ONCE
Status: COMPLETED | OUTPATIENT
Start: 2025-03-14 | End: 2025-03-14

## 2025-03-14 RX ORDER — LIDOCAINE HYDROCHLORIDE 10 MG/ML
INJECTION, SOLUTION EPIDURAL; INFILTRATION; INTRACAUDAL; PERINEURAL
Status: DISCONTINUED | OUTPATIENT
Start: 2025-03-14 | End: 2025-03-14 | Stop reason: HOSPADM

## 2025-03-14 RX ORDER — MIDAZOLAM HYDROCHLORIDE 1 MG/ML
INJECTION INTRAMUSCULAR; INTRAVENOUS
Status: DISCONTINUED | OUTPATIENT
Start: 2025-03-14 | End: 2025-03-14 | Stop reason: HOSPADM

## 2025-03-14 RX ADMIN — LIDOCAINE HYDROCHLORIDE 10 MG: 10 INJECTION, SOLUTION EPIDURAL; INFILTRATION; INTRACAUDAL at 01:03

## 2025-03-14 NOTE — OP NOTE
Therapeutic Lumbar Medial Branch Radiofrequency Ablation under Fluoroscopy     The procedure, risks, benefits, and options were discussed with the patient. There are no contraindications to the procedure. The patent expressed understanding and agreed to the procedure. Informed written consent was obtained prior to the start of the procedure and can be found in the patient's chart.        PATIENT NAME: Sabrina Black   MRN: [id number]     DATE OF PROCEDURE: 03/14/2025     PROCEDURE:  Bilateral L3, L4, and L5 Lumbar Radiofrequency Ablation under Fluoroscopy    PRE-OP DIAGNOSIS: Chronic bilateral low back pain with bilateral sciatica [M54.42, M54.41, G89.29]  Other spondylosis, lumbar region [M47.896] Lumbar spondylosis [M47.816]    POST-OP DIAGNOSIS: Same    PHYSICIAN: Luciano Bradshaw MD    ASSISTANTS: none     MEDICATIONS INJECTED:  Preservative-free Depomderol 80mg with 9cc of Bupivicaine 0.25%    LOCAL ANESTHETIC INJECTED:   Xylocaine 2%    SEDATION: Versed 2mg and Fentanyl 25mcg                                                                                                                                                                                     Conscious sedation ordered by M.D. Patient re-evaluation prior to administration of conscious sedation. No changes noted in patient's status from initial evaluation. The patient's vital signs were monitored by RN and patient remained hemodynamically stable throughout the procedure.    Event Time In   Sedation Start 1430   Sedation End 1502       ESTIMATED BLOOD LOSS:  None    COMPLICATIONS:  None     INTERVAL HISTORY: Patient has clinical and imaging findings suggestive of facet mediated pain. Patients has completed 2 previous diagnostic medial branch blocks at specified levels with at least 80% relief for the expected duration of the local anesthetic utilized.    TECHNIQUE: Time-out was performed to identify the patient and procedure to be performed. With the  patient laying in a prone position, the surgical area was prepped and draped in the usual sterile fashion using ChloraPrep and fenestrated drape. The levels were determined under fluoroscopic guidance. Skin anesthesia was achieved by injecting Lidocaine 2% over the injection sites. A 18 gauge 10mm curved active tip needle was introduced to the anatomic local of the medial branch at each of the above levels using AP, lateral and/or contralateral oblique fluoroscopic imaging. Then sensory and motor testing was performed to confirm that the needle tips were in the correct location. After negative aspiration for blood or CSF was confirmed, 1 mL of the lidocaine 2% listed above was injected slowly at each site. This was followed by thermal lesioning at 80 degrees celsius for 90 seconds. That was followed by slowly injecting 1 mL of the medication mixture listed above at each site. The needles were removed and bleeding was nil. A sterile dressing was applied. No specimens collected. The patient tolerated the procedure well and did not have any procedure related motor deficit at the conclusion of the procedure.    The patient was monitored after the procedure in the recovery area. They were given post-procedure and discharge instructions to follow at home. The patient was discharged in a stable condition.    Luciano Bradshaw MD

## 2025-03-14 NOTE — H&P
"HPI  Patient presenting for Procedure(s) (LRB):  Radiofrequency Ablation, Nerve, Spinal, Lumbar, Medial Branch, 1 Level l3,4,5 RFA ( pt needs after 1 pm) (Bilateral)     Patient on Anti-coagulation No    No health changes since previous encounter. No changes in pain since procedure was scheduled at previous visit. Denies any fevers or infections. Denies any issues with clotting or bleeding.     Facility-Administered Medications as of 3/14/2025   Medication Dose Route Frequency Provider Last Rate Last Admin    BUPivacaine (PF) 0.25% (2.5 mg/ml) injection    PRLuciano Hensley MD   10 mL at 03/14/25 1345    fentaNYL 50 mcg/mL injection    PRLuciano Hensley MD   25 mcg at 03/14/25 1435    lactated ringers infusion   Intravenous Continuous Luciano Bradshaw MD        [COMPLETED] LIDOcaine (PF) 10 mg/ml (1%) injection 10 mg  1 mL Intradermal Once Luciano Bradshaw MD   10 mg at 03/14/25 1315    LIDOcaine (PF) 10 mg/ml (1%) injection    PRLuciano Hensley MD   10 mL at 03/14/25 1345    LIDOcaine (PF) 20 mg/ml (2%) injection    PRLuciano Hensley MD   10 mL at 03/14/25 1345    methylPREDNISolone acetate injection    PRLuciano Hensley MD   80 mg at 03/14/25 1423    midazolam (VERSED) 1 mg/mL injection    PRLuciano Hensley MD   2 mg at 03/14/25 1435     Outpatient Medications as of 3/14/2025   Medication Sig Dispense Refill    atorvastatin (LIPITOR) 20 MG tablet Take 20 mg by mouth once daily.       MYRBETRIQ 50 mg Tb24 TAKE 1 TABLET(50 MG) BY MOUTH EVERY DAY (Patient not taking: Reported on 10/15/2024) 90 tablet 1     Past Medical History:   Diagnosis Date    Bipolar 1 disorder 2011    Diabetes mellitus, type 2     pt states "no longer diabetic since I lost 100lbs"  diet controlled    Hyperlipidemia 2017    Hypertension     Knee injury 2011    Lumbar pain 1994    herniated disc     Parkinson's disease 2016    Thyroid disease 2014     Past Surgical " History:   Procedure Laterality Date    ABLATION  1999    CARPAL TUNNEL RELEASE Right 12/11/2019    Procedure: RELEASE, CARPAL TUNNEL;  Surgeon: Alfredo Blanc MD;  Location: [facility] OR;  Service: Orthopedics;  Laterality: Right;    CAUDAL EPIDURAL STEROID INJECTION N/A 10/25/2024    Procedure: Injection-steroid-epidural-caudal;  Surgeon: Luciano Bradshaw MD;  Location: [facility] OR;  Service: Pain Management;  Laterality: N/A;    CHOLECYSTECTOMY  08/27/2018    Dr. Petersen     COLONOSCOPY N/A 10/13/2022    Procedure: COLONOSCOPY;  Surgeon: Kelechi Sheehan MD;  Location: [facility] ENDO;  Service: Endoscopy;  Laterality: N/A;    INJECTION OF ANESTHETIC AGENT AROUND MEDIAL BRANCH NERVES INNERVATING LUMBAR FACET JOINT Bilateral 12/20/2024    Procedure: Block-nerve-medial branch-lumbar;  Surgeon: Luciano Bradshaw MD;  Location: [facility] OR;  Service: Pain Management;  Laterality: Bilateral;    INJECTION OF ANESTHETIC AGENT AROUND MEDIAL BRANCH NERVES INNERVATING LUMBAR FACET JOINT Bilateral 2/21/2025    Procedure: Block-nerve-medial branch-lumbar;  Surgeon: Luciano Bradshaw MD;  Location: [facility] PAIN MANAGEMENT;  Service: Pain Management;  Laterality: Bilateral;    knee and back surgery  1994    Back surgery 1994    KNEE SURGERY Right 06/2019    LAPAROSCOPIC GASTRIC BANDING  2014    Gastric sleeve    NECK SURGERY  2004    neck     REMOVAL OF BONE SPUR OF FOOT Right 2010    TOTAL KNEE ARTHROPLASTY Right 06/05/2019     Review of patient's allergies indicates:   Allergen Reactions    Pcn [penicillins] Hives    Morphine Hallucinations    Tizanidine Other (See Comments)     Mouth sores    Gabapentin Nausea Only      Current Facility-Administered Medications   Medication    BUPivacaine (PF) 0.25% (2.5 mg/ml) injection    fentaNYL 50 mcg/mL injection    lactated ringers infusion    LIDOcaine (PF) 10 mg/ml (1%) injection    LIDOcaine (PF) 20 mg/ml (2%) injection    methylPREDNISolone acetate injection    midazolam  (VERSED) 1 mg/mL injection       PMHx, PSHx, Allergies, Medications reviewed in epic    ROS negative except pain complaints in HPI    OBJECTIVE:    BP (!) 153/88   Pulse 71   Temp 97.9 °F (36.6 °C) (Skin)   Resp 16   Wt 81.6 kg (179 lb 14.3 oz)   LMP 12/01/2014   SpO2 100%   Breastfeeding No   BMI 27.35 kg/m²     PHYSICAL EXAMINATION:    GENERAL: Well appearing, in no acute distress, alert and oriented.  PSYCH:  Mood and affect appropriate.  SKIN: Skin color, texture, turgor normal in procedure area, no rashes or lesions which will impact the procedure.  CV: RRR with palpation of the radial artery.  PULM: No evidence of respiratory difficulty, symmetric chest rise. Clear to auscultation.  NEURO: Alert. Oriented. Speech fluent and appropriate. Moving all extremities.    Plan:    Proceed with procedure as planned Procedure(s) (LRB):  Radiofrequency Ablation, Nerve, Spinal, Lumbar, Medial Branch, 1 Level l3,4,5 RFA ( pt needs after 1 pm) (Bilateral)    Luciano Bradshaw  03/14/2025

## 2025-03-14 NOTE — DISCHARGE SUMMARY
[facility]  Discharge Note  Short Stay     Admit Date: 3/14/2025    Discharge Date: 3/14/2025     Attending Physician: Luciano Bradshaw MD    Diagnoses:  Lumbar spondylosis    Discharged Condition: Good     Hospital Course: Patient was admitted for an outpatient interventional pain management procedure and tolerated the procedure well with no complications.     Final Diagnoses: Same as principal problem.     Disposition: Home or Self Care     Follow up/Patient Instructions:   Follow-up in 4 weeks unless otherwise instructed. May return sooner as needed.       Reconciled Medications:     Medication List        CONTINUE taking these medications      ARIPiprazole 10 MG Tab  Commonly known as: ABILIFY  Take 1 tablet (10 mg total) by mouth once daily.     atorvastatin 20 MG tablet  Commonly known as: LIPITOR  Take 20 mg by mouth once daily.     celecoxib 100 MG capsule  Commonly known as: CeleBREX  Take 1 capsule (100 mg total) by mouth 2 (two) times daily.     EScitalopram oxalate 10 MG tablet  Commonly known as: LEXAPRO  Take 1 tablet (10 mg total) by mouth once daily.     hydroCHLOROthiazide 25 MG tablet  Commonly known as: HYDRODIURIL  Take 1 tablet (25 mg total) by mouth once daily.     levothyroxine 88 MCG tablet  Commonly known as: SYNTHROID  Take 1 tablet (88 mcg total) by mouth before breakfast.     mirtazapine 15 MG tablet  Commonly known as: REMERON  Take 1 tablet (15 mg total) by mouth every evening.     traMADoL 50 mg tablet  Commonly known as: ULTRAM  Take 1 tablet (50 mg total) by mouth every 12 (twelve) hours as needed for Pain.            ASK your doctor about these medications      MYRBETRIQ 50 mg Tb24  Generic drug: mirabegron  TAKE 1 TABLET(50 MG) BY MOUTH EVERY DAY             Discharge Procedure Orders (must include Diet, Follow-up, Activity)   Ice to affected area   Order Comments: 20 minutes of ice or until area numb to the touch if area is sore 2-3 times per day as needed     No  driving until:   Order Comments: Until following day     No dressing needed     Notify your health care provider if you experience any of the following:  temperature >100.4     Notify your health care provider if you experience any of the following:  persistent nausea and vomiting or diarrhea     Notify your health care provider if you experience any of the following:  severe uncontrolled pain     Notify your health care provider if you experience any of the following:  redness, tenderness, or signs of infection (pain, swelling, redness, odor or green/yellow discharge around incision site)     Notify your health care provider if you experience any of the following:  difficulty breathing or increased cough     Notify your health care provider if you experience any of the following:  severe persistent headache     Notify your health care provider if you experience any of the following:  worsening rash     Notify your health care provider if you experience any of the following:  persistent dizziness, light-headedness, or visual disturbances     Notify your health care provider if you experience any of the following:  increased confusion or weakness     Shower on day dressing removed (No bath)       Luciano Bradshaw MD  Interventional Pain Medicine / Physical Medicine & Rehabilitation

## 2025-03-18 ENCOUNTER — TELEPHONE (OUTPATIENT)
Dept: OPHTHALMOLOGY | Facility: CLINIC | Age: 55
End: 2025-03-18
Payer: MEDICARE

## 2025-03-18 NOTE — TELEPHONE ENCOUNTER
----- Message from Gloria sent at 3/18/2025  4:06 PM CDT -----  Type:  Sooner Appointment RequestCaller is requesting a sooner appointment.  Caller declined first available appointment listed below.  Caller will not accept being placed on the waitlist and is requesting a message be sent to doctor.Name of Caller:pt When is the first available appointment?na Symptoms:exam and contact fitting missed appt, Would the patient rather a call back or a response via [facility]? Call back Best Call Back Number:[phone] Additional Information:     Please call back to advise. Thank you.

## 2025-03-19 VITALS
OXYGEN SATURATION: 99 % | TEMPERATURE: 98 F | WEIGHT: 179.88 LBS | DIASTOLIC BLOOD PRESSURE: 79 MMHG | BODY MASS INDEX: 27.35 KG/M2 | RESPIRATION RATE: 16 BRPM | SYSTOLIC BLOOD PRESSURE: 139 MMHG | HEART RATE: 74 BPM

## 2025-07-22 ENCOUNTER — OFFICE VISIT (OUTPATIENT)
Dept: OPTOMETRY | Facility: CLINIC | Age: 55
End: 2025-07-22
Payer: COMMERCIAL

## 2025-07-22 ENCOUNTER — OFFICE VISIT (OUTPATIENT)
Dept: OPTOMETRY | Facility: CLINIC | Age: 55
End: 2025-07-22
Payer: MEDICARE

## 2025-07-22 DIAGNOSIS — H04.123 DRY EYE SYNDROME, BILATERAL: ICD-10-CM

## 2025-07-22 DIAGNOSIS — Z46.0 CONTACT LENS/GLASSES FITTING: Primary | ICD-10-CM

## 2025-07-22 DIAGNOSIS — H25.13 NUCLEAR SCLEROSIS, BILATERAL: ICD-10-CM

## 2025-07-22 DIAGNOSIS — H52.7 REFRACTIVE ERROR: ICD-10-CM

## 2025-07-22 DIAGNOSIS — Z01.00 EXAMINATION OF EYES AND VISION: Primary | ICD-10-CM

## 2025-07-22 PROCEDURE — 92014 COMPRE OPH EXAM EST PT 1/>: CPT | Mod: S$GLB,,, | Performed by: OPTOMETRIST

## 2025-07-22 PROCEDURE — 92310 CONTACT LENS FITTING OU: CPT | Mod: CSM,,, | Performed by: OPTOMETRIST

## 2025-07-22 PROCEDURE — 92015 DETERMINE REFRACTIVE STATE: CPT | Mod: S$GLB,,, | Performed by: OPTOMETRIST

## 2025-07-22 PROCEDURE — 99999 PR PBB SHADOW E&M-EST. PATIENT-LVL II: CPT | Mod: PBBFAC,,, | Performed by: OPTOMETRIST

## 2025-07-22 PROCEDURE — 99499 UNLISTED E&M SERVICE: CPT | Mod: ,,, | Performed by: OPTOMETRIST

## 2025-07-22 PROCEDURE — 99999 PR PBB SHADOW E&M-EST. PATIENT-LVL III: CPT | Mod: PBBFAC,,, | Performed by: OPTOMETRIST

## 2025-07-22 NOTE — PROGRESS NOTES
HPI     Annual Exam     Additional comments: DLE 9-2024           Comments    Pt here today for ocular health exam with refraction to update specs &   clrx.       States no visual changes or complaints.    Pt did not follow up with contacts from last visit but would like to go   back to wearing monovision.    Denies any headaches or eye pain.    (+) dry eyes -- feels like film over OU  (-) gtts  (-) floaters or light flashes    Has not worn contacts in the past year            Last edited by Jace Montiel, OD on 7/22/2025  1:50 PM.            Assessment /Plan     For exam results, see Encounter Report.    Examination of eyes and vision    Nuclear sclerosis, bilateral    Refractive error      1. Examination of eyes and vision (Primary)    2. Nuclear sclerosis, bilateral  Mild OU, not VS  Discussed possible ocular affects of cataracts. Acceptable BCVA OU.  Discussed treatment options. Surgery not recommended at this time.   Monitor yearly.     3. Refractive error  Dispensed updated spectacle Rx. Discussed various spectacle lens options. Discussed adaptation period to new specs.     Discussed cls options -- happy with previous brand and monovision  Dispensed CLs trials: Air Optix N&D. Daily wear only, dispose of monthly.   Discussed proper hand hygiene and wear/care of lenses. Do not sleep/swim/shower in lenses.   Discontinue CL wear ASAP and RTC if any redness or discomfort occurs.  Try x 1 week, report back with progress    4. Dry eye syndrome, bilateral  Discussed ocular affects of dry eyes.   Recommend OTC artificial tears 2-4 times a day in both eyes.  Discussed chronicity of ROBIN.   RTC if symptoms not alleviated by continued use of artificial tears.

## 2025-07-22 NOTE — PROGRESS NOTES
Assessment /Plan     For exam results, see Encounter Report.    Contact lens/glasses fitting      Patient is here for a comprehensive eye exam and contact lens fit. See other exam visit with same encounter date 07/22/25 for detailed exam information.

## 2025-08-18 ENCOUNTER — TELEPHONE (OUTPATIENT)
Dept: OPHTHALMOLOGY | Facility: CLINIC | Age: 55
End: 2025-08-18
Payer: MEDICARE

## 2025-08-19 ENCOUNTER — TELEPHONE (OUTPATIENT)
Dept: PSYCHIATRY | Facility: CLINIC | Age: 55
End: 2025-08-19
Payer: MEDICARE

## 2025-08-21 ENCOUNTER — TELEPHONE (OUTPATIENT)
Dept: OPTOMETRY | Facility: CLINIC | Age: 55
End: 2025-08-21
Payer: MEDICARE

## (undated) DEVICE — GLV SURG SENSICARE MICRO PF LF 8.5 STRL

## (undated) DEVICE — TOWEL OR DISP STRL BLUE 4/PK

## (undated) DEVICE — SPONGE BULKEE II ABSRB 6X6.75

## (undated) DEVICE — ENSEAL LAPAROSCOPIC TISSUE SEALER G2 ARTICULATING CURVED JAW FOR USE WITH G2 GENERATOR 5MM DIAMETER 45CM SHAFT LENGTH: Brand: ENSEAL

## (undated) DEVICE — GLV SURG SENSICARE MICRO PF LF 6 STRL

## (undated) DEVICE — GLOVE SENSICARE PI GRN 7.5

## (undated) DEVICE — SYS LABEL CORRECT MED

## (undated) DEVICE — APPLICATOR CHLORAPREP ORN 26ML

## (undated) DEVICE — COVER LIGHT HANDLE LB53

## (undated) DEVICE — VISIGI 3D®  CALIBRATION SYSTEM  SIZE 36FR STD W/ BULB: Brand: BOEHRINGER® VISIGI 3D™ SLEEVE GASTRECTOMY CALIBRATION SYSTEM, SIZE 36FR W/BULB

## (undated) DEVICE — NDL SPINAL 25GX3.5 SPINOCAN

## (undated) DEVICE — COVER MAYO STAND 89601

## (undated) DEVICE — SYR DISP LL 5CC

## (undated) DEVICE — PK OSC LAP SLV 40

## (undated) DEVICE — GLV SURG SENSICARE GREEN W/ALOE PF LF 8.5 STRL

## (undated) DEVICE — APL DUPLOSPRAYER MIS 40CM

## (undated) DEVICE — NDL EPIDURAL 17GAX5IN

## (undated) DEVICE — CUFF TOURNIQUET 18DUAL PRT 5921-218-235

## (undated) DEVICE — GLV SURG SENSICARE GREEN W/ALOE PF LF 6 STRL

## (undated) DEVICE — UNDERGLOVE BIOGEL MICRO BLUE SZ 8.5

## (undated) DEVICE — PAD GROUNDING DISPER ELECTRODE

## (undated) DEVICE — JELLY,LUBE,STERILE,FLIP TOP,TUBE,4-OZ: Brand: MEDLINE

## (undated) DEVICE — VIOLET BRAIDED (POLYGLACTIN 910), SYNTHETIC ABSORBABLE SUTURE: Brand: COATED VICRYL

## (undated) DEVICE — PAD BOVIE ADULT

## (undated) DEVICE — CHLORAPREP 10.5 ML APPLICATOR

## (undated) DEVICE — DRAPE MEDIUM SHEET 40X70IN

## (undated) DEVICE — ADHS SKIN DERMABOND TOP ADVANCED

## (undated) DEVICE — SUTURE ETHILON 4-0 PS-2 18 1667H

## (undated) DEVICE — NDL ECLIPSE SAF REG 25GX1.5IN

## (undated) DEVICE — CANNULA RADIOPAQUE 20G CURVED

## (undated) DEVICE — VISUALIZATION SYSTEM: Brand: CLEARIFY

## (undated) DEVICE — PADDING CAST 3 STERILE 30-320

## (undated) DEVICE — SYR 10CC LUER LOCK

## (undated) DEVICE — GLOVE BIOGEL PI GOLD SZ  8.5

## (undated) DEVICE — SOLUTION IRRI NS BOTTLE 1000ML R5200-01

## (undated) DEVICE — TRAY UPPER EXTREMITY

## (undated) DEVICE — ECHELON FLEX POWERED PLUS LONG ARTICULATING ENDOSCOPIC LINEAR CUTTER, 60MM: Brand: ECHELON FLEX

## (undated) DEVICE — PREP CHLORA 26ML

## (undated) DEVICE — CONTAINER SPECIMEN OR STER 4OZ

## (undated) DEVICE — SUT SILK 0 SH 30IN K834H

## (undated) DEVICE — SYR GLASS 5CC LUER LOK